# Patient Record
Sex: FEMALE | Race: WHITE | Employment: OTHER | URBAN - METROPOLITAN AREA
[De-identification: names, ages, dates, MRNs, and addresses within clinical notes are randomized per-mention and may not be internally consistent; named-entity substitution may affect disease eponyms.]

---

## 2019-01-01 ENCOUNTER — CARE COORDINATION (OUTPATIENT)
Dept: CASE MANAGEMENT | Age: 74
End: 2019-01-01

## 2019-01-01 ENCOUNTER — TELEPHONE (OUTPATIENT)
Dept: ONCOLOGY | Age: 74
End: 2019-01-01

## 2019-01-01 ENCOUNTER — HOSPITAL ENCOUNTER (OUTPATIENT)
Age: 74
Setting detail: SPECIMEN
Discharge: HOME | End: 2019-09-24
Payer: MEDICARE

## 2019-01-01 ENCOUNTER — APPOINTMENT (OUTPATIENT)
Dept: MRI IMAGING | Age: 74
DRG: 246 | End: 2019-01-01
Payer: MEDICARE

## 2019-01-01 ENCOUNTER — PREP FOR PROCEDURE (OUTPATIENT)
Dept: GYNECOLOGIC ONCOLOGY | Age: 74
End: 2019-01-01

## 2019-01-01 ENCOUNTER — APPOINTMENT (OUTPATIENT)
Dept: GENERAL RADIOLOGY | Age: 74
DRG: 377 | End: 2019-01-01
Payer: MEDICARE

## 2019-01-01 ENCOUNTER — HOSPITAL ENCOUNTER (OUTPATIENT)
Age: 74
Setting detail: SPECIMEN
Discharge: HOME | End: 2019-08-27
Payer: MEDICARE

## 2019-01-01 ENCOUNTER — TELEPHONE (OUTPATIENT)
Dept: INTERNAL MEDICINE CLINIC | Age: 74
End: 2019-01-01

## 2019-01-01 ENCOUNTER — NURSE TRIAGE (OUTPATIENT)
Dept: OTHER | Facility: CLINIC | Age: 74
End: 2019-01-01

## 2019-01-01 ENCOUNTER — APPOINTMENT (OUTPATIENT)
Dept: GENERAL RADIOLOGY | Age: 74
DRG: 193 | End: 2019-01-01
Payer: MEDICARE

## 2019-01-01 ENCOUNTER — HOSPITAL ENCOUNTER (OUTPATIENT)
Age: 74
Setting detail: SPECIMEN
Discharge: HOME | End: 2019-05-30
Payer: MEDICARE

## 2019-01-01 ENCOUNTER — APPOINTMENT (OUTPATIENT)
Dept: GENERAL RADIOLOGY | Age: 74
DRG: 246 | End: 2019-01-01
Payer: MEDICARE

## 2019-01-01 ENCOUNTER — HOSPITAL ENCOUNTER (INPATIENT)
Age: 74
LOS: 2 days | Discharge: HOME | DRG: 193 | End: 2019-04-15
Attending: EMERGENCY MEDICINE | Admitting: INTERNAL MEDICINE
Payer: MEDICARE

## 2019-01-01 ENCOUNTER — HOSPITAL ENCOUNTER (OUTPATIENT)
Age: 74
Setting detail: SPECIMEN
Discharge: HOME | End: 2019-09-23
Payer: MEDICARE

## 2019-01-01 ENCOUNTER — HOSPITAL ENCOUNTER (OUTPATIENT)
Age: 74
Setting detail: SPECIMEN
Discharge: HOME | End: 2019-10-01
Payer: MEDICARE

## 2019-01-01 ENCOUNTER — HOSPITAL ENCOUNTER (OUTPATIENT)
Dept: RADIATION ONCOLOGY | Age: 74
Discharge: HOME | End: 2019-10-01
Attending: RADIOLOGY
Payer: MEDICARE

## 2019-01-01 ENCOUNTER — HOSPITAL ENCOUNTER (OUTPATIENT)
Dept: GENERAL RADIOLOGY | Facility: CLINIC | Age: 74
Discharge: HOME | End: 2019-08-29
Payer: MEDICARE

## 2019-01-01 ENCOUNTER — OFFICE VISIT (OUTPATIENT)
Dept: INTERNAL MEDICINE CLINIC | Age: 74
End: 2019-01-01
Payer: MEDICARE

## 2019-01-01 ENCOUNTER — OFFICE VISIT (OUTPATIENT)
Dept: GYNECOLOGIC ONCOLOGY | Age: 74
End: 2019-01-01
Payer: MEDICARE

## 2019-01-01 ENCOUNTER — ANESTHESIA EVENT (OUTPATIENT)
Dept: OPERATING ROOM | Age: 74
DRG: 246 | End: 2019-01-01
Payer: MEDICARE

## 2019-01-01 ENCOUNTER — TELEPHONE (OUTPATIENT)
Dept: RADIATION ONCOLOGY | Age: 74
End: 2019-01-01

## 2019-01-01 ENCOUNTER — ANESTHESIA (OUTPATIENT)
Dept: ENDOSCOPY | Age: 74
DRG: 377 | End: 2019-01-01
Payer: MEDICARE

## 2019-01-01 ENCOUNTER — TELEPHONE (OUTPATIENT)
Dept: GYNECOLOGIC ONCOLOGY | Age: 74
End: 2019-01-01

## 2019-01-01 ENCOUNTER — APPOINTMENT (OUTPATIENT)
Dept: ULTRASOUND IMAGING | Age: 74
DRG: 246 | End: 2019-01-01
Payer: MEDICARE

## 2019-01-01 ENCOUNTER — OFFICE VISIT (OUTPATIENT)
Dept: DERMATOLOGY | Age: 74
End: 2019-01-01
Payer: MEDICARE

## 2019-01-01 ENCOUNTER — APPOINTMENT (OUTPATIENT)
Dept: INTERVENTIONAL RADIOLOGY/VASCULAR | Age: 74
DRG: 377 | End: 2019-01-01
Payer: MEDICARE

## 2019-01-01 ENCOUNTER — HOSPITAL ENCOUNTER (OUTPATIENT)
Dept: RADIATION ONCOLOGY | Age: 74
End: 2019-01-01
Attending: RADIOLOGY
Payer: MEDICARE

## 2019-01-01 ENCOUNTER — HOSPITAL ENCOUNTER (OUTPATIENT)
Dept: RADIATION ONCOLOGY | Age: 74
Discharge: HOME | End: 2019-09-30
Attending: RADIOLOGY
Payer: MEDICARE

## 2019-01-01 ENCOUNTER — APPOINTMENT (OUTPATIENT)
Dept: ULTRASOUND IMAGING | Age: 74
DRG: 377 | End: 2019-01-01
Payer: MEDICARE

## 2019-01-01 ENCOUNTER — HOSPITAL ENCOUNTER (OUTPATIENT)
Age: 74
Setting detail: SPECIMEN
Discharge: HOME | End: 2019-10-09
Payer: MEDICARE

## 2019-01-01 ENCOUNTER — APPOINTMENT (OUTPATIENT)
Dept: CARDIAC CATH/INVASIVE PROCEDURES | Age: 74
DRG: 246 | End: 2019-01-01
Payer: MEDICARE

## 2019-01-01 ENCOUNTER — ANESTHESIA EVENT (OUTPATIENT)
Dept: ENDOSCOPY | Age: 74
DRG: 377 | End: 2019-01-01
Payer: MEDICARE

## 2019-01-01 ENCOUNTER — HOSPITAL ENCOUNTER (OUTPATIENT)
Dept: RADIATION ONCOLOGY | Age: 74
Discharge: HOME | End: 2019-10-02
Attending: RADIOLOGY
Payer: MEDICARE

## 2019-01-01 ENCOUNTER — HOSPITAL ENCOUNTER (OUTPATIENT)
Dept: RADIATION ONCOLOGY | Age: 74
Discharge: HOME | End: 2019-09-27
Attending: RADIOLOGY
Payer: MEDICARE

## 2019-01-01 ENCOUNTER — OFFICE VISIT (OUTPATIENT)
Dept: ONCOLOGY | Age: 74
End: 2019-01-01
Payer: MEDICARE

## 2019-01-01 ENCOUNTER — HOSPITAL ENCOUNTER (INPATIENT)
Age: 74
LOS: 7 days | Discharge: NURSING, REHAB OR LONG-TERM CARE | DRG: 377 | End: 2019-11-19
Attending: EMERGENCY MEDICINE | Admitting: FAMILY MEDICINE
Payer: MEDICARE

## 2019-01-01 ENCOUNTER — HOSPITAL ENCOUNTER (OUTPATIENT)
Dept: RADIATION ONCOLOGY | Age: 74
Discharge: HOME | End: 2019-09-27
Payer: MEDICARE

## 2019-01-01 ENCOUNTER — HOSPITAL ENCOUNTER (OUTPATIENT)
Age: 74
Setting detail: SPECIMEN
Discharge: HOME | End: 2019-10-08
Payer: MEDICARE

## 2019-01-01 ENCOUNTER — APPOINTMENT (OUTPATIENT)
Dept: INTERVENTIONAL RADIOLOGY/VASCULAR | Age: 74
DRG: 246 | End: 2019-01-01
Payer: MEDICARE

## 2019-01-01 ENCOUNTER — HOSPITAL ENCOUNTER (OUTPATIENT)
Facility: CLINIC | Age: 74
Discharge: HOME | End: 2019-08-29
Payer: MEDICARE

## 2019-01-01 ENCOUNTER — HOSPITAL ENCOUNTER (OUTPATIENT)
Facility: MEDICAL CENTER | Age: 74
End: 2019-01-01
Payer: MEDICARE

## 2019-01-01 ENCOUNTER — APPOINTMENT (OUTPATIENT)
Dept: CT IMAGING | Age: 74
DRG: 377 | End: 2019-01-01
Payer: MEDICARE

## 2019-01-01 ENCOUNTER — CLINICAL DOCUMENTATION (OUTPATIENT)
Dept: RADIATION ONCOLOGY | Age: 74
End: 2019-01-01

## 2019-01-01 ENCOUNTER — HOSPITAL ENCOUNTER (OUTPATIENT)
Dept: RADIATION ONCOLOGY | Age: 74
Discharge: HOME | End: 2019-10-03
Attending: RADIOLOGY
Payer: MEDICARE

## 2019-01-01 ENCOUNTER — HOSPITAL ENCOUNTER (INPATIENT)
Age: 74
LOS: 16 days | Discharge: NURSING, REHAB OR LONG-TERM CARE | DRG: 246 | End: 2019-09-18
Attending: EMERGENCY MEDICINE | Admitting: INTERNAL MEDICINE
Payer: MEDICARE

## 2019-01-01 ENCOUNTER — HOSPITAL ENCOUNTER (OUTPATIENT)
Age: 74
Setting detail: SPECIMEN
Discharge: HOME | End: 2019-10-12
Payer: MEDICARE

## 2019-01-01 ENCOUNTER — APPOINTMENT (OUTPATIENT)
Dept: CT IMAGING | Age: 74
DRG: 246 | End: 2019-01-01
Payer: MEDICARE

## 2019-01-01 ENCOUNTER — ANESTHESIA (OUTPATIENT)
Dept: OPERATING ROOM | Age: 74
DRG: 246 | End: 2019-01-01
Payer: MEDICARE

## 2019-01-01 VITALS — OXYGEN SATURATION: 100 % | DIASTOLIC BLOOD PRESSURE: 54 MMHG | SYSTOLIC BLOOD PRESSURE: 86 MMHG

## 2019-01-01 VITALS
WEIGHT: 292 LBS | OXYGEN SATURATION: 95 % | HEIGHT: 67 IN | BODY MASS INDEX: 45.83 KG/M2 | DIASTOLIC BLOOD PRESSURE: 74 MMHG | SYSTOLIC BLOOD PRESSURE: 138 MMHG | HEART RATE: 86 BPM

## 2019-01-01 VITALS
OXYGEN SATURATION: 95 % | BODY MASS INDEX: 45.99 KG/M2 | WEIGHT: 293 LBS | SYSTOLIC BLOOD PRESSURE: 164 MMHG | HEART RATE: 75 BPM | DIASTOLIC BLOOD PRESSURE: 78 MMHG | HEIGHT: 67 IN

## 2019-01-01 VITALS
SYSTOLIC BLOOD PRESSURE: 95 MMHG | HEART RATE: 62 BPM | RESPIRATION RATE: 19 BRPM | DIASTOLIC BLOOD PRESSURE: 43 MMHG | TEMPERATURE: 98.8 F

## 2019-01-01 VITALS
HEART RATE: 85 BPM | BODY MASS INDEX: 44.1 KG/M2 | OXYGEN SATURATION: 96 % | HEIGHT: 67 IN | SYSTOLIC BLOOD PRESSURE: 164 MMHG | DIASTOLIC BLOOD PRESSURE: 84 MMHG | WEIGHT: 281 LBS

## 2019-01-01 VITALS
DIASTOLIC BLOOD PRESSURE: 62 MMHG | HEIGHT: 67 IN | HEART RATE: 102 BPM | OXYGEN SATURATION: 94 % | SYSTOLIC BLOOD PRESSURE: 118 MMHG | BODY MASS INDEX: 43.25 KG/M2

## 2019-01-01 VITALS
SYSTOLIC BLOOD PRESSURE: 48 MMHG | HEIGHT: 68 IN | TEMPERATURE: 97.3 F | HEART RATE: 85 BPM | WEIGHT: 249.56 LBS | RESPIRATION RATE: 28 BRPM | BODY MASS INDEX: 37.82 KG/M2 | OXYGEN SATURATION: 90 % | DIASTOLIC BLOOD PRESSURE: 18 MMHG

## 2019-01-01 VITALS
DIASTOLIC BLOOD PRESSURE: 64 MMHG | SYSTOLIC BLOOD PRESSURE: 97 MMHG | HEART RATE: 84 BPM | TEMPERATURE: 97.4 F | OXYGEN SATURATION: 99 % | RESPIRATION RATE: 16 BRPM

## 2019-01-01 VITALS
SYSTOLIC BLOOD PRESSURE: 100 MMHG | TEMPERATURE: 98.3 F | RESPIRATION RATE: 14 BRPM | DIASTOLIC BLOOD PRESSURE: 63 MMHG | HEART RATE: 77 BPM | OXYGEN SATURATION: 98 %

## 2019-01-01 VITALS
DIASTOLIC BLOOD PRESSURE: 61 MMHG | SYSTOLIC BLOOD PRESSURE: 99 MMHG | TEMPERATURE: 97.4 F | BODY MASS INDEX: 42.75 KG/M2 | HEART RATE: 68 BPM | WEIGHT: 273 LBS

## 2019-01-01 VITALS
DIASTOLIC BLOOD PRESSURE: 84 MMHG | HEIGHT: 68 IN | OXYGEN SATURATION: 93 % | WEIGHT: 293 LBS | BODY MASS INDEX: 44.41 KG/M2 | HEART RATE: 93 BPM | SYSTOLIC BLOOD PRESSURE: 136 MMHG

## 2019-01-01 VITALS
WEIGHT: 276.24 LBS | SYSTOLIC BLOOD PRESSURE: 125 MMHG | DIASTOLIC BLOOD PRESSURE: 60 MMHG | RESPIRATION RATE: 24 BRPM | OXYGEN SATURATION: 92 % | TEMPERATURE: 98 F | BODY MASS INDEX: 43.36 KG/M2 | HEART RATE: 70 BPM | HEIGHT: 67 IN

## 2019-01-01 VITALS — TEMPERATURE: 98.3 F | DIASTOLIC BLOOD PRESSURE: 85 MMHG | SYSTOLIC BLOOD PRESSURE: 159 MMHG | OXYGEN SATURATION: 99 %

## 2019-01-01 VITALS
DIASTOLIC BLOOD PRESSURE: 58 MMHG | TEMPERATURE: 97.1 F | HEART RATE: 73 BPM | OXYGEN SATURATION: 100 % | SYSTOLIC BLOOD PRESSURE: 138 MMHG

## 2019-01-01 VITALS
OXYGEN SATURATION: 96 % | HEART RATE: 89 BPM | SYSTOLIC BLOOD PRESSURE: 162 MMHG | BODY MASS INDEX: 44.41 KG/M2 | TEMPERATURE: 98.3 F | DIASTOLIC BLOOD PRESSURE: 58 MMHG | RESPIRATION RATE: 18 BRPM | WEIGHT: 293 LBS | HEIGHT: 68 IN

## 2019-01-01 DIAGNOSIS — C54.1 ENDOMETRIAL ADENOCARCINOMA (HCC): Primary | ICD-10-CM

## 2019-01-01 DIAGNOSIS — C54.1 ENDOMETRIAL ADENOCARCINOMA (HCC): ICD-10-CM

## 2019-01-01 DIAGNOSIS — I50.43 CHF (CONGESTIVE HEART FAILURE), NYHA CLASS I, ACUTE ON CHRONIC, COMBINED (HCC): ICD-10-CM

## 2019-01-01 DIAGNOSIS — Q63.8 DUPLEX KIDNEY: ICD-10-CM

## 2019-01-01 DIAGNOSIS — D64.9 HEMOGLOBIN LOW: ICD-10-CM

## 2019-01-01 DIAGNOSIS — R06.02 SHORTNESS OF BREATH: Primary | ICD-10-CM

## 2019-01-01 DIAGNOSIS — R06.02 SHORTNESS OF BREATH: ICD-10-CM

## 2019-01-01 DIAGNOSIS — I10 ESSENTIAL HYPERTENSION: Primary | ICD-10-CM

## 2019-01-01 DIAGNOSIS — I50.9 CONGESTIVE HEART FAILURE, UNSPECIFIED HF CHRONICITY, UNSPECIFIED HEART FAILURE TYPE (HCC): Primary | ICD-10-CM

## 2019-01-01 DIAGNOSIS — J18.9 PNEUMONIA DUE TO ORGANISM: Primary | ICD-10-CM

## 2019-01-01 DIAGNOSIS — E03.8 SUBCLINICAL HYPOTHYROIDISM: ICD-10-CM

## 2019-01-01 DIAGNOSIS — E66.01 MORBID OBESITY (HCC): ICD-10-CM

## 2019-01-01 DIAGNOSIS — I50.812 CHRONIC RIGHT-SIDED CONGESTIVE HEART FAILURE (HCC): ICD-10-CM

## 2019-01-01 DIAGNOSIS — E06.3 HASHIMOTO'S THYROIDITIS: Primary | ICD-10-CM

## 2019-01-01 DIAGNOSIS — K59.00 CONSTIPATION, UNSPECIFIED CONSTIPATION TYPE: ICD-10-CM

## 2019-01-01 DIAGNOSIS — D50.9 IRON DEFICIENCY ANEMIA, UNSPECIFIED IRON DEFICIENCY ANEMIA TYPE: ICD-10-CM

## 2019-01-01 DIAGNOSIS — I10 ESSENTIAL HYPERTENSION: ICD-10-CM

## 2019-01-01 DIAGNOSIS — Z12.39 BREAST CANCER SCREENING: ICD-10-CM

## 2019-01-01 DIAGNOSIS — I82.5Y3 CHRONIC DEEP VEIN THROMBOSIS (DVT) OF PROXIMAL VEIN OF BOTH LOWER EXTREMITIES (HCC): ICD-10-CM

## 2019-01-01 DIAGNOSIS — I10 RESISTANT HYPERTENSION: ICD-10-CM

## 2019-01-01 DIAGNOSIS — L91.8 INFLAMED ACROCHORDON: Primary | ICD-10-CM

## 2019-01-01 DIAGNOSIS — I10 RESISTANT HYPERTENSION: Primary | ICD-10-CM

## 2019-01-01 DIAGNOSIS — K92.2 GASTROINTESTINAL HEMORRHAGE, UNSPECIFIED GASTROINTESTINAL HEMORRHAGE TYPE: Primary | ICD-10-CM

## 2019-01-01 DIAGNOSIS — L91.8 SKIN TAGS, MULTIPLE ACQUIRED: ICD-10-CM

## 2019-01-01 DIAGNOSIS — Z00.00 HEALTH CARE MAINTENANCE: ICD-10-CM

## 2019-01-01 DIAGNOSIS — N95.0 POSTMENOPAUSAL BLEEDING: Primary | ICD-10-CM

## 2019-01-01 DIAGNOSIS — Z12.11 COLON CANCER SCREENING: ICD-10-CM

## 2019-01-01 DIAGNOSIS — I16.1 HYPERTENSIVE EMERGENCY: ICD-10-CM

## 2019-01-01 DIAGNOSIS — E06.3 HASHIMOTO'S THYROIDITIS: ICD-10-CM

## 2019-01-01 DIAGNOSIS — B59 PNEUMONIA OF BOTH UPPER LOBES DUE TO PNEUMOCYSTIS JIROVECII (HCC): Primary | ICD-10-CM

## 2019-01-01 DIAGNOSIS — I10 HYPERTENSION, UNSPECIFIED TYPE: ICD-10-CM

## 2019-01-01 LAB
-: ABNORMAL
-: ABNORMAL
-: NORMAL
-: NORMAL
ABO/RH: NORMAL
ABSOLUTE BANDS #: 0.26 K/UL (ref 0–1)
ABSOLUTE EOS #: 0 K/UL (ref 0–0.4)
ABSOLUTE EOS #: 0.06 K/UL (ref 0–0.44)
ABSOLUTE EOS #: 0.07 K/UL (ref 0–0.44)
ABSOLUTE EOS #: 0.07 K/UL (ref 0–0.44)
ABSOLUTE EOS #: 0.08 K/UL (ref 0–0.44)
ABSOLUTE EOS #: 0.1 K/UL (ref 0–0.4)
ABSOLUTE EOS #: 0.1 K/UL (ref 0–0.44)
ABSOLUTE EOS #: 0.11 K/UL (ref 0–0.44)
ABSOLUTE EOS #: 0.12 K/UL (ref 0–0.44)
ABSOLUTE EOS #: 0.13 K/UL (ref 0–0.4)
ABSOLUTE EOS #: 0.13 K/UL (ref 0–0.44)
ABSOLUTE EOS #: 0.14 K/UL (ref 0–0.44)
ABSOLUTE EOS #: 0.16 K/UL (ref 0–0.44)
ABSOLUTE EOS #: 0.17 K/UL (ref 0–0.44)
ABSOLUTE EOS #: 0.18 K/UL (ref 0–0.44)
ABSOLUTE EOS #: <0.03 K/UL (ref 0–0.44)
ABSOLUTE EOS #: <0.03 K/UL (ref 0–0.44)
ABSOLUTE IMMATURE GRANULOCYTE: 0.03 K/UL (ref 0–0.3)
ABSOLUTE IMMATURE GRANULOCYTE: 0.03 K/UL (ref 0–0.3)
ABSOLUTE IMMATURE GRANULOCYTE: 0.04 K/UL (ref 0–0.3)
ABSOLUTE IMMATURE GRANULOCYTE: 0.05 K/UL (ref 0–0.3)
ABSOLUTE IMMATURE GRANULOCYTE: 0.06 K/UL (ref 0–0.3)
ABSOLUTE IMMATURE GRANULOCYTE: 0.07 K/UL (ref 0–0.3)
ABSOLUTE IMMATURE GRANULOCYTE: 0.08 K/UL (ref 0–0.3)
ABSOLUTE IMMATURE GRANULOCYTE: 0.11 K/UL (ref 0–0.3)
ABSOLUTE IMMATURE GRANULOCYTE: 0.13 K/UL (ref 0–0.3)
ABSOLUTE IMMATURE GRANULOCYTE: 0.17 K/UL (ref 0–0.3)
ABSOLUTE IMMATURE GRANULOCYTE: 0.17 K/UL (ref 0–0.3)
ABSOLUTE IMMATURE GRANULOCYTE: <0.03 K/UL (ref 0–0.3)
ABSOLUTE IMMATURE GRANULOCYTE: ABNORMAL K/UL (ref 0–0.3)
ABSOLUTE LYMPH #: 0.26 K/UL (ref 1–4.8)
ABSOLUTE LYMPH #: 0.37 K/UL (ref 1–4.8)
ABSOLUTE LYMPH #: 0.4 K/UL (ref 1–4.8)
ABSOLUTE LYMPH #: 0.47 K/UL (ref 1–4.8)
ABSOLUTE LYMPH #: 0.53 K/UL (ref 1–4.8)
ABSOLUTE LYMPH #: 0.7 K/UL (ref 1–4.8)
ABSOLUTE LYMPH #: 0.84 K/UL (ref 1.1–3.7)
ABSOLUTE LYMPH #: 0.88 K/UL (ref 1.1–3.7)
ABSOLUTE LYMPH #: 0.91 K/UL (ref 1.1–3.7)
ABSOLUTE LYMPH #: 1.01 K/UL (ref 1.1–3.7)
ABSOLUTE LYMPH #: 1.04 K/UL (ref 1.1–3.7)
ABSOLUTE LYMPH #: 1.07 K/UL (ref 1.1–3.7)
ABSOLUTE LYMPH #: 1.12 K/UL (ref 1.1–3.7)
ABSOLUTE LYMPH #: 1.15 K/UL (ref 1.1–3.7)
ABSOLUTE LYMPH #: 1.17 K/UL (ref 1.1–3.7)
ABSOLUTE LYMPH #: 1.18 K/UL (ref 1.1–3.7)
ABSOLUTE LYMPH #: 1.2 K/UL (ref 1.1–3.7)
ABSOLUTE LYMPH #: 1.36 K/UL (ref 1.1–3.7)
ABSOLUTE LYMPH #: 1.36 K/UL (ref 1.1–3.7)
ABSOLUTE LYMPH #: 1.7 K/UL (ref 1.1–3.7)
ABSOLUTE MONO #: 0.17 K/UL (ref 0.1–1.3)
ABSOLUTE MONO #: 0.29 K/UL (ref 0.1–1.3)
ABSOLUTE MONO #: 0.34 K/UL (ref 0.1–1.3)
ABSOLUTE MONO #: 0.38 K/UL (ref 0.1–1.3)
ABSOLUTE MONO #: 0.4 K/UL (ref 0.1–1.3)
ABSOLUTE MONO #: 0.4 K/UL (ref 0.1–1.3)
ABSOLUTE MONO #: 0.49 K/UL (ref 0.1–1.2)
ABSOLUTE MONO #: 0.52 K/UL (ref 0.1–1.2)
ABSOLUTE MONO #: 0.54 K/UL (ref 0.1–1.2)
ABSOLUTE MONO #: 0.55 K/UL (ref 0.1–1.2)
ABSOLUTE MONO #: 0.58 K/UL (ref 0.1–1.2)
ABSOLUTE MONO #: 0.6 K/UL (ref 0.1–1.2)
ABSOLUTE MONO #: 0.62 K/UL (ref 0.1–1.2)
ABSOLUTE MONO #: 0.66 K/UL (ref 0.1–1.2)
ABSOLUTE MONO #: 0.69 K/UL (ref 0.1–1.2)
ABSOLUTE MONO #: 0.86 K/UL (ref 0.1–1.2)
ABSOLUTE RETIC #: 0.11 M/UL (ref 0.03–0.08)
ALBUMIN SERPL-MCNC: 2.4 G/DL (ref 3.5–5.2)
ALBUMIN SERPL-MCNC: 2.4 G/DL (ref 3.5–5.2)
ALBUMIN SERPL-MCNC: 4 G/DL (ref 3.5–5.2)
ALBUMIN/GLOBULIN RATIO: 0.8 (ref 1–2.5)
ALBUMIN/GLOBULIN RATIO: ABNORMAL (ref 1–2.5)
ALBUMIN/GLOBULIN RATIO: ABNORMAL (ref 1–2.5)
ALDOSTERONE COMMENT: NORMAL
ALDOSTERONE: 6.5 NG/DL
ALLEN TEST: ABNORMAL
ALP BLD-CCNC: 64 U/L (ref 35–104)
ALP BLD-CCNC: 73 U/L (ref 35–104)
ALP BLD-CCNC: 81 U/L (ref 35–104)
ALT SERPL-CCNC: 11 U/L (ref 5–33)
ALT SERPL-CCNC: 12 U/L (ref 5–33)
ALT SERPL-CCNC: 14 U/L (ref 5–33)
AMORPHOUS: ABNORMAL
AMORPHOUS: ABNORMAL
AMORPHOUS: NORMAL
ANA REFERENCE RANGE:: ABNORMAL
ANION GAP SERPL CALCULATED.3IONS-SCNC: 10 MMOL/L (ref 9–17)
ANION GAP SERPL CALCULATED.3IONS-SCNC: 11 MMOL/L (ref 9–17)
ANION GAP SERPL CALCULATED.3IONS-SCNC: 12 MMOL/L (ref 9–17)
ANION GAP SERPL CALCULATED.3IONS-SCNC: 13 MMOL/L (ref 9–17)
ANION GAP SERPL CALCULATED.3IONS-SCNC: 14 MMOL/L (ref 9–17)
ANION GAP SERPL CALCULATED.3IONS-SCNC: 16 MMOL/L (ref 9–17)
ANION GAP SERPL CALCULATED.3IONS-SCNC: 17 MMOL/L (ref 9–17)
ANION GAP SERPL CALCULATED.3IONS-SCNC: 8 MMOL/L (ref 9–17)
ANION GAP SERPL CALCULATED.3IONS-SCNC: 8 MMOL/L (ref 9–17)
ANION GAP SERPL CALCULATED.3IONS-SCNC: 9 MMOL/L (ref 9–17)
ANION GAP SERPL CALCULATED.3IONS-SCNC: 9 MMOL/L (ref 9–17)
ANION GAP: 12 MMOL/L (ref 7–16)
ANTI DNA DOUBLE STRANDED: 48 IU/ML
ANTI JO-1 IGG: 16 U/ML
ANTI RNP AB: 27 U/ML
ANTI SSA: 121 U/ML
ANTI SSB: 13 U/ML
ANTI-CENTROMERE: 18 U/ML
ANTI-NUCLEAR ANTIBODY (ANA): POSITIVE
ANTI-SCLERODERMA: 57 U/ML
ANTI-SMITH: 21 U/ML
ANTIBODY SCREEN: NEGATIVE
ARM BAND NUMBER: NORMAL
AST SERPL-CCNC: 15 U/L
AST SERPL-CCNC: 19 U/L
AST SERPL-CCNC: 21 U/L
BACTERIA: ABNORMAL
BACTERIA: ABNORMAL
BACTERIA: NORMAL
BANDS: 3 % (ref 0–10)
BASOPHILS # BLD: 0 % (ref 0–2)
BASOPHILS # BLD: 1 % (ref 0–2)
BASOPHILS ABSOLUTE: 0 K/UL (ref 0–0.2)
BASOPHILS ABSOLUTE: 0.03 K/UL (ref 0–0.2)
BASOPHILS ABSOLUTE: 0.03 K/UL (ref 0–0.2)
BASOPHILS ABSOLUTE: 0.04 K/UL (ref 0–0.2)
BASOPHILS ABSOLUTE: 0.05 K/UL (ref 0–0.2)
BASOPHILS ABSOLUTE: 0.09 K/UL (ref 0–0.2)
BASOPHILS ABSOLUTE: 0.1 K/UL (ref 0–0.2)
BASOPHILS ABSOLUTE: <0.03 K/UL (ref 0–0.2)
BILIRUB SERPL-MCNC: 0.53 MG/DL (ref 0.3–1.2)
BILIRUB SERPL-MCNC: 0.62 MG/DL (ref 0.3–1.2)
BILIRUB SERPL-MCNC: 0.73 MG/DL (ref 0.3–1.2)
BILIRUBIN URINE: ABNORMAL
BILIRUBIN URINE: NEGATIVE
BILIRUBIN URINE: NEGATIVE
BLD PROD TYP BPU: NORMAL
BLOOD BANK SPECIMEN: NORMAL
BNP INTERPRETATION: ABNORMAL
BUN BLDV-MCNC: 11 MG/DL (ref 8–23)
BUN BLDV-MCNC: 11 MG/DL (ref 8–23)
BUN BLDV-MCNC: 14 MG/DL (ref 8–23)
BUN BLDV-MCNC: 16 MG/DL (ref 8–23)
BUN BLDV-MCNC: 17 MG/DL (ref 8–23)
BUN BLDV-MCNC: 20 MG/DL (ref 8–23)
BUN BLDV-MCNC: 20 MG/DL (ref 8–23)
BUN BLDV-MCNC: 21 MG/DL (ref 8–23)
BUN BLDV-MCNC: 21 MG/DL (ref 8–23)
BUN BLDV-MCNC: 22 MG/DL (ref 8–23)
BUN BLDV-MCNC: 23 MG/DL (ref 8–23)
BUN BLDV-MCNC: 25 MG/DL (ref 8–23)
BUN BLDV-MCNC: 25 MG/DL (ref 8–23)
BUN BLDV-MCNC: 27 MG/DL (ref 8–23)
BUN BLDV-MCNC: 28 MG/DL (ref 8–23)
BUN BLDV-MCNC: 39 MG/DL (ref 8–23)
BUN BLDV-MCNC: 40 MG/DL (ref 8–23)
BUN BLDV-MCNC: 43 MG/DL (ref 8–23)
BUN BLDV-MCNC: 47 MG/DL (ref 8–23)
BUN BLDV-MCNC: 49 MG/DL (ref 8–23)
BUN BLDV-MCNC: 79 MG/DL (ref 8–23)
BUN BLDV-MCNC: 80 MG/DL (ref 8–23)
BUN BLDV-MCNC: 83 MG/DL (ref 8–23)
BUN BLDV-MCNC: 85 MG/DL (ref 8–23)
BUN BLDV-MCNC: 85 MG/DL (ref 8–23)
BUN BLDV-MCNC: 86 MG/DL (ref 8–23)
BUN/CREAT BLD: ABNORMAL (ref 9–20)
C TRACH DNA GENITAL QL NAA+PROBE: NEGATIVE
CA 125: 2780 U/ML
CA 19-9: 2309 U/ML (ref 0–35)
CALCIUM SERPL-MCNC: 7.6 MG/DL (ref 8.6–10.4)
CALCIUM SERPL-MCNC: 7.6 MG/DL (ref 8.6–10.4)
CALCIUM SERPL-MCNC: 7.7 MG/DL (ref 8.6–10.4)
CALCIUM SERPL-MCNC: 7.8 MG/DL (ref 8.6–10.4)
CALCIUM SERPL-MCNC: 7.9 MG/DL (ref 8.6–10.4)
CALCIUM SERPL-MCNC: 8 MG/DL (ref 8.6–10.4)
CALCIUM SERPL-MCNC: 8.1 MG/DL (ref 8.6–10.4)
CALCIUM SERPL-MCNC: 8.2 MG/DL (ref 8.6–10.4)
CALCIUM SERPL-MCNC: 8.3 MG/DL (ref 8.6–10.4)
CALCIUM SERPL-MCNC: 8.3 MG/DL (ref 8.6–10.4)
CALCIUM SERPL-MCNC: 8.5 MG/DL (ref 8.6–10.4)
CALCIUM SERPL-MCNC: 8.8 MG/DL (ref 8.6–10.4)
CALCIUM SERPL-MCNC: 8.8 MG/DL (ref 8.6–10.4)
CALCIUM SERPL-MCNC: 8.9 MG/DL (ref 8.6–10.4)
CALCIUM SERPL-MCNC: 9 MG/DL (ref 8.6–10.4)
CARCINOEMBRYONIC ANTIGEN: 25.1 NG/ML
CASTS UA: ABNORMAL /LPF
CASTS UA: ABNORMAL /LPF
CASTS UA: NORMAL /LPF (ref 0–8)
CHLORIDE BLD-SCNC: 100 MMOL/L (ref 98–107)
CHLORIDE BLD-SCNC: 101 MMOL/L (ref 98–107)
CHLORIDE BLD-SCNC: 101 MMOL/L (ref 98–107)
CHLORIDE BLD-SCNC: 102 MMOL/L (ref 98–107)
CHLORIDE BLD-SCNC: 103 MMOL/L (ref 98–107)
CHLORIDE BLD-SCNC: 103 MMOL/L (ref 98–107)
CHLORIDE BLD-SCNC: 104 MMOL/L (ref 98–107)
CHLORIDE BLD-SCNC: 106 MMOL/L (ref 98–107)
CHLORIDE BLD-SCNC: 109 MMOL/L (ref 98–107)
CHLORIDE BLD-SCNC: 109 MMOL/L (ref 98–107)
CHLORIDE BLD-SCNC: 112 MMOL/L (ref 98–107)
CHLORIDE BLD-SCNC: 113 MMOL/L (ref 98–107)
CHLORIDE BLD-SCNC: 115 MMOL/L (ref 98–107)
CHLORIDE BLD-SCNC: 116 MMOL/L (ref 98–107)
CHLORIDE BLD-SCNC: 96 MMOL/L (ref 98–107)
CHLORIDE BLD-SCNC: 97 MMOL/L (ref 98–107)
CHLORIDE BLD-SCNC: 99 MMOL/L (ref 98–107)
CHLORIDE, UR: <20 MMOL/L
CHOLESTEROL/HDL RATIO: 4.2
CHOLESTEROL: 157 MG/DL
CO2: 17 MMOL/L (ref 20–31)
CO2: 18 MMOL/L (ref 20–31)
CO2: 18 MMOL/L (ref 20–31)
CO2: 20 MMOL/L (ref 20–31)
CO2: 20 MMOL/L (ref 20–31)
CO2: 21 MMOL/L (ref 20–31)
CO2: 21 MMOL/L (ref 20–31)
CO2: 22 MMOL/L (ref 20–31)
CO2: 23 MMOL/L (ref 20–31)
CO2: 24 MMOL/L (ref 20–31)
CO2: 25 MMOL/L (ref 20–31)
CO2: 26 MMOL/L (ref 20–31)
CO2: 27 MMOL/L (ref 20–31)
CO2: 29 MMOL/L (ref 20–31)
CO2: 29 MMOL/L (ref 20–31)
COLOR: ABNORMAL
COLOR: ABNORMAL
COLOR: YELLOW
COMMENT UA: ABNORMAL
COMPLEMENT C3: 58 MG/DL (ref 90–180)
COMPLEMENT C4: 29 MG/DL (ref 10–40)
CREAT SERPL-MCNC: 0.63 MG/DL (ref 0.5–0.9)
CREAT SERPL-MCNC: 0.68 MG/DL (ref 0.5–0.9)
CREAT SERPL-MCNC: 0.72 MG/DL (ref 0.5–0.9)
CREAT SERPL-MCNC: 0.75 MG/DL (ref 0.5–0.9)
CREAT SERPL-MCNC: 0.75 MG/DL (ref 0.5–0.9)
CREAT SERPL-MCNC: 0.77 MG/DL (ref 0.5–0.9)
CREAT SERPL-MCNC: 0.78 MG/DL (ref 0.5–0.9)
CREAT SERPL-MCNC: 0.79 MG/DL (ref 0.5–0.9)
CREAT SERPL-MCNC: 0.83 MG/DL (ref 0.5–0.9)
CREAT SERPL-MCNC: 0.85 MG/DL (ref 0.5–0.9)
CREAT SERPL-MCNC: 0.9 MG/DL (ref 0.5–0.9)
CREAT SERPL-MCNC: 0.92 MG/DL (ref 0.5–0.9)
CREAT SERPL-MCNC: 0.94 MG/DL (ref 0.5–0.9)
CREAT SERPL-MCNC: 0.97 MG/DL (ref 0.5–0.9)
CREAT SERPL-MCNC: 1 MG/DL (ref 0.5–0.9)
CREAT SERPL-MCNC: 1.03 MG/DL (ref 0.5–0.9)
CREAT SERPL-MCNC: 1.11 MG/DL (ref 0.5–0.9)
CREAT SERPL-MCNC: 1.14 MG/DL (ref 0.5–0.9)
CREAT SERPL-MCNC: 1.29 MG/DL (ref 0.5–0.9)
CREAT SERPL-MCNC: 1.35 MG/DL (ref 0.5–0.9)
CREAT SERPL-MCNC: 2 MG/DL (ref 0.5–0.9)
CREAT SERPL-MCNC: 2.2 MG/DL (ref 0.5–0.9)
CREAT SERPL-MCNC: 2.24 MG/DL (ref 0.5–0.9)
CREAT SERPL-MCNC: 2.24 MG/DL (ref 0.5–0.9)
CREAT SERPL-MCNC: 2.28 MG/DL (ref 0.5–0.9)
CREAT SERPL-MCNC: 2.32 MG/DL (ref 0.5–0.9)
CREAT SERPL-MCNC: 2.36 MG/DL (ref 0.5–0.9)
CREAT SERPL-MCNC: 2.38 MG/DL (ref 0.5–0.9)
CROSSMATCH RESULT: NORMAL
CRYSTALS, UA: ABNORMAL /HPF
CRYSTALS, UA: ABNORMAL /HPF
CRYSTALS, UA: NORMAL /HPF
CULTURE: ABNORMAL
CULTURE: ABNORMAL
CULTURE: NO GROWTH
CULTURE: NORMAL
CYTOLOGY REPORT: NORMAL
DATE, STOOL #1: ABNORMAL
DATE, STOOL #1: NORMAL
DATE, STOOL #2: ABNORMAL
DATE, STOOL #2: NORMAL
DATE, STOOL #3: ABNORMAL
DATE, STOOL #3: NORMAL
DIFFERENTIAL TYPE: ABNORMAL
DIRECT EXAM: ABNORMAL
DIRECT EXAM: NORMAL
DISPENSE STATUS BLOOD BANK: NORMAL
EKG ATRIAL RATE: 117 BPM
EKG ATRIAL RATE: 84 BPM
EKG ATRIAL RATE: 89 BPM
EKG ATRIAL RATE: 95 BPM
EKG P AXIS: 36 DEGREES
EKG P AXIS: 58 DEGREES
EKG P AXIS: 62 DEGREES
EKG P AXIS: 69 DEGREES
EKG P-R INTERVAL: 162 MS
EKG P-R INTERVAL: 168 MS
EKG P-R INTERVAL: 178 MS
EKG P-R INTERVAL: 204 MS
EKG Q-T INTERVAL: 314 MS
EKG Q-T INTERVAL: 344 MS
EKG Q-T INTERVAL: 364 MS
EKG Q-T INTERVAL: 376 MS
EKG QRS DURATION: 74 MS
EKG QRS DURATION: 76 MS
EKG QRS DURATION: 80 MS
EKG QRS DURATION: 80 MS
EKG QTC CALCULATION (BAZETT): 418 MS
EKG QTC CALCULATION (BAZETT): 438 MS
EKG QTC CALCULATION (BAZETT): 444 MS
EKG QTC CALCULATION (BAZETT): 457 MS
EKG R AXIS: 24 DEGREES
EKG R AXIS: 28 DEGREES
EKG R AXIS: 36 DEGREES
EKG R AXIS: 36 DEGREES
EKG T AXIS: 103 DEGREES
EKG T AXIS: 112 DEGREES
EKG T AXIS: 61 DEGREES
EKG T AXIS: 78 DEGREES
EKG VENTRICULAR RATE: 117 BPM
EKG VENTRICULAR RATE: 84 BPM
EKG VENTRICULAR RATE: 89 BPM
EKG VENTRICULAR RATE: 95 BPM
EOSINOPHIL,URINE: NORMAL
EOSINOPHILS RELATIVE PERCENT: 0 % (ref 0–4)
EOSINOPHILS RELATIVE PERCENT: 0 % (ref 1–4)
EOSINOPHILS RELATIVE PERCENT: 0 % (ref 1–4)
EOSINOPHILS RELATIVE PERCENT: 1 % (ref 1–4)
EOSINOPHILS RELATIVE PERCENT: 2 % (ref 0–4)
EOSINOPHILS RELATIVE PERCENT: 2 % (ref 0–4)
EOSINOPHILS RELATIVE PERCENT: 2 % (ref 1–4)
EPITHELIAL CELLS UA: ABNORMAL /HPF
EPITHELIAL CELLS UA: ABNORMAL /HPF
EPITHELIAL CELLS UA: NORMAL /HPF (ref 0–5)
ESTIMATED AVERAGE GLUCOSE: 114 MG/DL
EXPIRATION DATE: NORMAL
FERRITIN: 106 UG/L (ref 13–150)
FIO2: ABNORMAL
FLOW CYTOMETRY BL: NORMAL
FLOW CYTOMETRY SOURCE: NORMAL
FLOW CYTOMETRY, NODE/FLUID: NORMAL
FREE KAPPA/LAMBDA RATIO: 1.26 (ref 0.26–1.65)
GFR AFRICAN AMERICAN: 24 ML/MIN
GFR AFRICAN AMERICAN: 24 ML/MIN
GFR AFRICAN AMERICAN: 25 ML/MIN
GFR AFRICAN AMERICAN: 25 ML/MIN
GFR AFRICAN AMERICAN: 26 ML/MIN
GFR AFRICAN AMERICAN: 29 ML/MIN
GFR AFRICAN AMERICAN: 46 ML/MIN
GFR AFRICAN AMERICAN: 49 ML/MIN
GFR AFRICAN AMERICAN: 56 ML/MIN
GFR AFRICAN AMERICAN: 58 ML/MIN
GFR AFRICAN AMERICAN: >60 ML/MIN
GFR NON-AFRICAN AMERICAN: 20 ML/MIN
GFR NON-AFRICAN AMERICAN: 21 ML/MIN
GFR NON-AFRICAN AMERICAN: 22 ML/MIN
GFR NON-AFRICAN AMERICAN: 24 ML/MIN
GFR NON-AFRICAN AMERICAN: 38 ML/MIN
GFR NON-AFRICAN AMERICAN: 40 ML/MIN
GFR NON-AFRICAN AMERICAN: 46 ML/MIN
GFR NON-AFRICAN AMERICAN: 48 ML/MIN
GFR NON-AFRICAN AMERICAN: 52 ML/MIN
GFR NON-AFRICAN AMERICAN: 54 ML/MIN
GFR NON-AFRICAN AMERICAN: 56 ML/MIN
GFR NON-AFRICAN AMERICAN: 58 ML/MIN
GFR NON-AFRICAN AMERICAN: 60 ML/MIN
GFR NON-AFRICAN AMERICAN: >60 ML/MIN
GFR SERPL CREATININE-BSD FRML MDRD: >60 ML/MIN
GFR SERPL CREATININE-BSD FRML MDRD: ABNORMAL ML/MIN/{1.73_M2}
GFR SERPL CREATININE-BSD FRML MDRD: NORMAL ML/MIN/{1.73_M2}
GLUCOSE BLD-MCNC: 101 MG/DL (ref 70–99)
GLUCOSE BLD-MCNC: 101 MG/DL (ref 70–99)
GLUCOSE BLD-MCNC: 103 MG/DL (ref 70–99)
GLUCOSE BLD-MCNC: 104 MG/DL (ref 70–99)
GLUCOSE BLD-MCNC: 106 MG/DL (ref 70–99)
GLUCOSE BLD-MCNC: 106 MG/DL (ref 70–99)
GLUCOSE BLD-MCNC: 109 MG/DL (ref 70–99)
GLUCOSE BLD-MCNC: 113 MG/DL (ref 70–99)
GLUCOSE BLD-MCNC: 114 MG/DL (ref 74–100)
GLUCOSE BLD-MCNC: 117 MG/DL (ref 70–99)
GLUCOSE BLD-MCNC: 122 MG/DL (ref 70–99)
GLUCOSE BLD-MCNC: 123 MG/DL (ref 65–105)
GLUCOSE BLD-MCNC: 127 MG/DL (ref 70–99)
GLUCOSE BLD-MCNC: 130 MG/DL (ref 70–99)
GLUCOSE BLD-MCNC: 134 MG/DL (ref 70–99)
GLUCOSE BLD-MCNC: 135 MG/DL (ref 70–99)
GLUCOSE BLD-MCNC: 140 MG/DL (ref 70–99)
GLUCOSE BLD-MCNC: 155 MG/DL (ref 70–99)
GLUCOSE BLD-MCNC: 159 MG/DL (ref 70–99)
GLUCOSE BLD-MCNC: 83 MG/DL (ref 65–105)
GLUCOSE BLD-MCNC: 86 MG/DL (ref 65–105)
GLUCOSE BLD-MCNC: 87 MG/DL (ref 70–99)
GLUCOSE BLD-MCNC: 88 MG/DL (ref 70–99)
GLUCOSE BLD-MCNC: 95 MG/DL (ref 65–105)
GLUCOSE BLD-MCNC: 95 MG/DL (ref 70–99)
GLUCOSE BLD-MCNC: 96 MG/DL (ref 65–105)
GLUCOSE BLD-MCNC: 96 MG/DL (ref 70–99)
GLUCOSE BLD-MCNC: 96 MG/DL (ref 70–99)
GLUCOSE BLD-MCNC: 97 MG/DL (ref 70–99)
GLUCOSE BLD-MCNC: 98 MG/DL (ref 70–99)
GLUCOSE BLD-MCNC: 98 MG/DL (ref 70–99)
GLUCOSE BLD-MCNC: 99 MG/DL (ref 70–99)
GLUCOSE URINE: NEGATIVE
HBA1C MFR BLD: 5.6 % (ref 4–6)
HCO3 VENOUS: 22.4 MMOL/L (ref 22–29)
HCT VFR BLD CALC: 19.5 % (ref 36–46)
HCT VFR BLD CALC: 21.4 % (ref 36–46)
HCT VFR BLD CALC: 21.9 % (ref 36.3–47.1)
HCT VFR BLD CALC: 22 % (ref 36–46)
HCT VFR BLD CALC: 22.4 % (ref 36.3–47.1)
HCT VFR BLD CALC: 22.7 % (ref 36.3–47.1)
HCT VFR BLD CALC: 22.7 % (ref 36.3–47.1)
HCT VFR BLD CALC: 22.9 % (ref 36.3–47.1)
HCT VFR BLD CALC: 23 % (ref 36.3–47.1)
HCT VFR BLD CALC: 23.4 % (ref 36.3–47.1)
HCT VFR BLD CALC: 23.5 % (ref 36.3–47.1)
HCT VFR BLD CALC: 23.5 % (ref 36.3–47.1)
HCT VFR BLD CALC: 23.6 % (ref 36.3–47.1)
HCT VFR BLD CALC: 23.8 % (ref 36.3–47.1)
HCT VFR BLD CALC: 23.8 % (ref 36–46)
HCT VFR BLD CALC: 24 % (ref 36.3–47.1)
HCT VFR BLD CALC: 24.1 % (ref 36.3–47.1)
HCT VFR BLD CALC: 24.1 % (ref 36.3–47.1)
HCT VFR BLD CALC: 24.2 % (ref 36.3–47.1)
HCT VFR BLD CALC: 24.2 % (ref 36.3–47.1)
HCT VFR BLD CALC: 24.4 % (ref 36.3–47.1)
HCT VFR BLD CALC: 24.6 % (ref 36.3–47.1)
HCT VFR BLD CALC: 24.7 % (ref 36–46)
HCT VFR BLD CALC: 24.8 % (ref 36.3–47.1)
HCT VFR BLD CALC: 24.9 % (ref 36.3–47.1)
HCT VFR BLD CALC: 25.2 % (ref 36.3–47.1)
HCT VFR BLD CALC: 25.3 % (ref 36.3–47.1)
HCT VFR BLD CALC: 25.4 % (ref 36.3–47.1)
HCT VFR BLD CALC: 25.5 % (ref 36.3–47.1)
HCT VFR BLD CALC: 25.6 % (ref 36.3–47.1)
HCT VFR BLD CALC: 25.8 % (ref 36–46)
HCT VFR BLD CALC: 25.9 % (ref 36.3–47.1)
HCT VFR BLD CALC: 26 % (ref 36.3–47.1)
HCT VFR BLD CALC: 26.1 % (ref 36.3–47.1)
HCT VFR BLD CALC: 26.1 % (ref 36.3–47.1)
HCT VFR BLD CALC: 26.2 % (ref 36.3–47.1)
HCT VFR BLD CALC: 26.2 % (ref 36.3–47.1)
HCT VFR BLD CALC: 26.4 % (ref 36.3–47.1)
HCT VFR BLD CALC: 26.6 % (ref 36.3–47.1)
HCT VFR BLD CALC: 27.1 % (ref 36.3–47.1)
HCT VFR BLD CALC: 27.3 % (ref 36.3–47.1)
HCT VFR BLD CALC: 27.4 % (ref 36.3–47.1)
HCT VFR BLD CALC: 27.9 % (ref 36.3–47.1)
HCT VFR BLD CALC: 28.2 % (ref 36.3–47.1)
HCT VFR BLD CALC: 28.4 % (ref 36–46)
HCT VFR BLD CALC: 28.8 % (ref 36.3–47.1)
HCT VFR BLD CALC: 28.8 % (ref 36.3–47.1)
HCT VFR BLD CALC: 28.9 % (ref 36.3–47.1)
HCT VFR BLD CALC: 29.1 % (ref 36–46)
HCT VFR BLD CALC: 29.2 % (ref 36.3–47.1)
HCT VFR BLD CALC: 29.2 % (ref 36.3–47.1)
HCT VFR BLD CALC: 29.2 % (ref 36–46)
HCT VFR BLD CALC: 30.5 % (ref 36.3–47.1)
HCT VFR BLD CALC: 33.5 % (ref 36.3–47.1)
HCT VFR BLD CALC: 35 % (ref 36.3–47.1)
HCT VFR BLD CALC: 37.2 % (ref 36.3–47.1)
HCT VFR BLD CALC: 37.6 % (ref 36–46)
HCT VFR BLD CALC: 38.2 % (ref 36–46)
HCT VFR BLD CALC: 39.4 % (ref 36–46)
HDLC SERPL-MCNC: 37 MG/DL
HEMOCCULT SP1 STL QL: NEGATIVE
HEMOCCULT SP1 STL QL: POSITIVE
HEMOCCULT SP2 STL QL: ABNORMAL
HEMOCCULT SP2 STL QL: NORMAL
HEMOCCULT SP3 STL QL: ABNORMAL
HEMOCCULT SP3 STL QL: NORMAL
HEMOGLOBIN: 10.3 G/DL (ref 11.9–15.1)
HEMOGLOBIN: 10.8 G/DL (ref 11.9–15.1)
HEMOGLOBIN: 11.4 G/DL (ref 11.9–15.1)
HEMOGLOBIN: 12.4 G/DL (ref 12–16)
HEMOGLOBIN: 12.9 G/DL (ref 12–16)
HEMOGLOBIN: 13.6 G/DL (ref 12–16)
HEMOGLOBIN: 6.3 G/DL (ref 12–16)
HEMOGLOBIN: 6.6 G/DL (ref 11.9–15.1)
HEMOGLOBIN: 6.8 G/DL (ref 12–16)
HEMOGLOBIN: 6.9 G/DL (ref 11.9–15.1)
HEMOGLOBIN: 7 G/DL (ref 11.9–15.1)
HEMOGLOBIN: 7.1 G/DL (ref 11.9–15.1)
HEMOGLOBIN: 7.1 G/DL (ref 11.9–15.1)
HEMOGLOBIN: 7.1 G/DL (ref 12–16)
HEMOGLOBIN: 7.2 G/DL (ref 11.9–15.1)
HEMOGLOBIN: 7.2 G/DL (ref 12–16)
HEMOGLOBIN: 7.3 G/DL (ref 11.9–15.1)
HEMOGLOBIN: 7.5 G/DL (ref 11.9–15.1)
HEMOGLOBIN: 7.6 G/DL (ref 11.9–15.1)
HEMOGLOBIN: 7.7 G/DL (ref 11.9–15.1)
HEMOGLOBIN: 7.7 G/DL (ref 11.9–15.1)
HEMOGLOBIN: 7.8 G/DL (ref 11.9–15.1)
HEMOGLOBIN: 7.9 G/DL (ref 11.9–15.1)
HEMOGLOBIN: 7.9 G/DL (ref 12–16)
HEMOGLOBIN: 8 G/DL (ref 11.9–15.1)
HEMOGLOBIN: 8 G/DL (ref 12–16)
HEMOGLOBIN: 8.1 G/DL (ref 11.9–15.1)
HEMOGLOBIN: 8.2 G/DL (ref 11.9–15.1)
HEMOGLOBIN: 8.2 G/DL (ref 11.9–15.1)
HEMOGLOBIN: 8.3 G/DL (ref 11.9–15.1)
HEMOGLOBIN: 8.3 G/DL (ref 11.9–15.1)
HEMOGLOBIN: 8.4 G/DL (ref 11.9–15.1)
HEMOGLOBIN: 8.5 G/DL (ref 11.9–15.1)
HEMOGLOBIN: 8.7 G/DL (ref 11.9–15.1)
HEMOGLOBIN: 8.7 G/DL (ref 12–16)
HEMOGLOBIN: 8.8 G/DL (ref 11.9–15.1)
HEMOGLOBIN: 8.8 G/DL (ref 11.9–15.1)
HEMOGLOBIN: 9.1 G/DL (ref 11.9–15.1)
HEMOGLOBIN: 9.1 G/DL (ref 12–16)
HEMOGLOBIN: 9.2 G/DL (ref 11.9–15.1)
HEMOGLOBIN: 9.5 G/DL (ref 12–16)
HEMOGLOBIN: 9.6 G/DL (ref 11.9–15.1)
HISTONE ANTIBODY: 30 U/ML
IMMATURE GRANULOCYTES: 0 %
IMMATURE GRANULOCYTES: 1 %
IMMATURE GRANULOCYTES: 2 %
IMMATURE GRANULOCYTES: ABNORMAL %
IMMATURE RETIC FRACT: 28.1 % (ref 2.7–18.3)
INR BLD: 1.3
INR BLD: 1.3
IRON SATURATION: 17 % (ref 20–55)
IRON: 29 UG/DL (ref 37–145)
KAPPA FREE LIGHT CHAINS QNT: 12.27 MG/DL (ref 0.37–1.94)
KETONES, URINE: NEGATIVE
LACTIC ACID: 2.4 MMOL/L (ref 0.5–2.2)
LAMBDA FREE LIGHT CHAINS QNT: 9.75 MG/DL (ref 0.57–2.63)
LDL CHOLESTEROL: 94 MG/DL (ref 0–130)
LEUKOCYTE ESTERASE, URINE: ABNORMAL
LIPASE: 31 U/L (ref 13–60)
LV EF: 60 %
LV EF: 65 %
LVEF MODALITY: NORMAL
LVEF MODALITY: NORMAL
LYMPHOCYTES # BLD: 10 % (ref 24–44)
LYMPHOCYTES # BLD: 11 % (ref 24–43)
LYMPHOCYTES # BLD: 12 % (ref 24–43)
LYMPHOCYTES # BLD: 13 % (ref 24–43)
LYMPHOCYTES # BLD: 13 % (ref 24–43)
LYMPHOCYTES # BLD: 14 % (ref 24–43)
LYMPHOCYTES # BLD: 15 % (ref 24–43)
LYMPHOCYTES # BLD: 16 % (ref 24–43)
LYMPHOCYTES # BLD: 3 % (ref 24–44)
LYMPHOCYTES # BLD: 5 % (ref 24–44)
LYMPHOCYTES # BLD: 6 % (ref 24–43)
LYMPHOCYTES # BLD: 6 % (ref 24–44)
LYMPHOCYTES # BLD: 6 % (ref 24–44)
LYMPHOCYTES # BLD: 7 % (ref 24–44)
Lab: ABNORMAL
Lab: NORMAL
MAGNESIUM: 2.1 MG/DL (ref 1.6–2.6)
MAGNESIUM: 2.1 MG/DL (ref 1.6–2.6)
MAGNESIUM: 2.3 MG/DL (ref 1.6–2.6)
MAGNESIUM: 2.4 MG/DL (ref 1.6–2.6)
MCH RBC QN AUTO: 29.6 PG (ref 26–34)
MCH RBC QN AUTO: 30.1 PG (ref 25.2–33.5)
MCH RBC QN AUTO: 30.3 PG (ref 25.2–33.5)
MCH RBC QN AUTO: 30.3 PG (ref 26–34)
MCH RBC QN AUTO: 30.7 PG (ref 25.2–33.5)
MCH RBC QN AUTO: 30.7 PG (ref 25.2–33.5)
MCH RBC QN AUTO: 30.8 PG (ref 25.2–33.5)
MCH RBC QN AUTO: 30.8 PG (ref 26–34)
MCH RBC QN AUTO: 30.9 PG (ref 25.2–33.5)
MCH RBC QN AUTO: 31.1 PG (ref 25.2–33.5)
MCH RBC QN AUTO: 31.1 PG (ref 25.2–33.5)
MCH RBC QN AUTO: 31.3 PG (ref 25.2–33.5)
MCH RBC QN AUTO: 31.7 PG (ref 25.2–33.5)
MCH RBC QN AUTO: 31.7 PG (ref 25.2–33.5)
MCH RBC QN AUTO: 31.9 PG (ref 26–34)
MCH RBC QN AUTO: 31.9 PG (ref 26–34)
MCH RBC QN AUTO: 32 PG (ref 25.2–33.5)
MCH RBC QN AUTO: 32 PG (ref 25.2–33.5)
MCH RBC QN AUTO: 32.3 PG (ref 26–34)
MCH RBC QN AUTO: 32.5 PG (ref 25.2–33.5)
MCH RBC QN AUTO: 33.1 PG (ref 26–34)
MCH RBC QN AUTO: 35 PG (ref 26–34)
MCHC RBC AUTO-ENTMCNC: 27.8 G/DL (ref 28.4–34.8)
MCHC RBC AUTO-ENTMCNC: 28.6 G/DL (ref 28.4–34.8)
MCHC RBC AUTO-ENTMCNC: 29 G/DL (ref 28.4–34.8)
MCHC RBC AUTO-ENTMCNC: 29.2 G/DL (ref 28.4–34.8)
MCHC RBC AUTO-ENTMCNC: 29.7 G/DL (ref 31–37)
MCHC RBC AUTO-ENTMCNC: 30 G/DL (ref 28.4–34.8)
MCHC RBC AUTO-ENTMCNC: 30.1 G/DL (ref 28.4–34.8)
MCHC RBC AUTO-ENTMCNC: 30.2 G/DL (ref 28.4–34.8)
MCHC RBC AUTO-ENTMCNC: 30.3 G/DL (ref 31–37)
MCHC RBC AUTO-ENTMCNC: 30.4 G/DL (ref 28.4–34.8)
MCHC RBC AUTO-ENTMCNC: 30.6 G/DL (ref 28.4–34.8)
MCHC RBC AUTO-ENTMCNC: 30.6 G/DL (ref 28.4–34.8)
MCHC RBC AUTO-ENTMCNC: 30.7 G/DL (ref 28.4–34.8)
MCHC RBC AUTO-ENTMCNC: 30.8 G/DL (ref 28.4–34.8)
MCHC RBC AUTO-ENTMCNC: 30.9 G/DL (ref 28.4–34.8)
MCHC RBC AUTO-ENTMCNC: 30.9 G/DL (ref 28.4–34.8)
MCHC RBC AUTO-ENTMCNC: 31 G/DL (ref 28.4–34.8)
MCHC RBC AUTO-ENTMCNC: 31 G/DL (ref 31–37)
MCHC RBC AUTO-ENTMCNC: 32.1 G/DL (ref 31–37)
MCHC RBC AUTO-ENTMCNC: 32.6 G/DL (ref 31–37)
MCHC RBC AUTO-ENTMCNC: 32.9 G/DL (ref 31–37)
MCHC RBC AUTO-ENTMCNC: 33.9 G/DL (ref 31–37)
MCHC RBC AUTO-ENTMCNC: 34.5 G/DL (ref 31–37)
MCV RBC AUTO: 100.3 FL (ref 82.6–102.9)
MCV RBC AUTO: 100.5 FL (ref 82.6–102.9)
MCV RBC AUTO: 101.9 FL (ref 82.6–102.9)
MCV RBC AUTO: 102.4 FL (ref 82.6–102.9)
MCV RBC AUTO: 102.6 FL (ref 82.6–102.9)
MCV RBC AUTO: 104 FL (ref 82.6–102.9)
MCV RBC AUTO: 104.1 FL (ref 82.6–102.9)
MCV RBC AUTO: 104.4 FL (ref 82.6–102.9)
MCV RBC AUTO: 106.1 FL (ref 82.6–102.9)
MCV RBC AUTO: 106.3 FL (ref 82.6–102.9)
MCV RBC AUTO: 106.6 FL (ref 80–100)
MCV RBC AUTO: 106.7 FL (ref 80–100)
MCV RBC AUTO: 107.4 FL (ref 80–100)
MCV RBC AUTO: 107.4 FL (ref 80–100)
MCV RBC AUTO: 108 FL (ref 82.6–102.9)
MCV RBC AUTO: 108.7 FL (ref 82.6–102.9)
MCV RBC AUTO: 111.2 FL (ref 82.6–102.9)
MCV RBC AUTO: 89.3 FL (ref 80–100)
MCV RBC AUTO: 89.5 FL (ref 80–100)
MCV RBC AUTO: 89.8 FL (ref 80–100)
MCV RBC AUTO: 98.3 FL (ref 82.6–102.9)
MCV RBC AUTO: 99.3 FL (ref 80–100)
MCV RBC AUTO: 99.6 FL (ref 82.6–102.9)
MCV RBC AUTO: 99.6 FL (ref 82.6–102.9)
MCV RBC AUTO: 99.7 FL (ref 82.6–102.9)
MODE: ABNORMAL
MONOCYTES # BLD: 2 % (ref 1–7)
MONOCYTES # BLD: 4 % (ref 1–7)
MONOCYTES # BLD: 5 % (ref 1–7)
MONOCYTES # BLD: 5 % (ref 3–12)
MONOCYTES # BLD: 6 % (ref 1–7)
MONOCYTES # BLD: 6 % (ref 3–12)
MONOCYTES # BLD: 6 % (ref 3–12)
MONOCYTES # BLD: 7 % (ref 3–12)
MONOCYTES # BLD: 8 % (ref 3–12)
MONOCYTES # BLD: 8 % (ref 3–12)
MORPHOLOGY: ABNORMAL
MUCUS: ABNORMAL
MUCUS: ABNORMAL
MUCUS: NORMAL
MYOGLOBIN: 253 NG/ML (ref 25–58)
N. GONORRHOEAE DNA: NEGATIVE
NEGATIVE BASE EXCESS, VEN: 1 (ref 0–2)
NITRITE, URINE: NEGATIVE
NITRITE, URINE: NEGATIVE
NITRITE, URINE: POSITIVE
NRBC AUTOMATED: 0 PER 100 WBC
NRBC AUTOMATED: 0.3 PER 100 WBC
NRBC AUTOMATED: 0.3 PER 100 WBC
NRBC AUTOMATED: 0.4 PER 100 WBC
NRBC AUTOMATED: 0.9 PER 100 WBC
NRBC AUTOMATED: 1.3 PER 100 WBC
NRBC AUTOMATED: 1.5 PER 100 WBC
NRBC AUTOMATED: ABNORMAL PER 100 WBC
O2 DEVICE/FLOW/%: ABNORMAL
O2 SAT, VEN: 75 % (ref 60–85)
OTHER OBSERVATIONS UA: ABNORMAL
OTHER OBSERVATIONS UA: ABNORMAL
OTHER OBSERVATIONS UA: NORMAL
PARTIAL THROMBOPLASTIN TIME: 23.7 SEC (ref 20.5–30.5)
PARTIAL THROMBOPLASTIN TIME: 33.9 SEC (ref 20.5–30.5)
PATIENT TEMP: ABNORMAL
PCO2, VEN: 31.5 MM HG (ref 41–51)
PDW BLD-RTO: 14.7 % (ref 11.5–14.9)
PDW BLD-RTO: 15 % (ref 11.5–14.9)
PDW BLD-RTO: 15.2 % (ref 11.8–14.4)
PDW BLD-RTO: 15.3 % (ref 11.5–14.9)
PDW BLD-RTO: 15.3 % (ref 11.8–14.4)
PDW BLD-RTO: 15.6 % (ref 11.8–14.4)
PDW BLD-RTO: 15.8 % (ref 11.8–14.4)
PDW BLD-RTO: 15.9 % (ref 11.8–14.4)
PDW BLD-RTO: 16.1 % (ref 11.8–14.4)
PDW BLD-RTO: 16.4 % (ref 11.8–14.4)
PDW BLD-RTO: 17.1 % (ref 11.8–14.4)
PDW BLD-RTO: 17.2 % (ref 11.8–14.4)
PDW BLD-RTO: 17.6 % (ref 11.8–14.4)
PDW BLD-RTO: 19.3 % (ref 11.8–14.4)
PDW BLD-RTO: 19.6 % (ref 11.8–14.4)
PDW BLD-RTO: 19.7 % (ref 11.8–14.4)
PDW BLD-RTO: 19.7 % (ref 11.8–14.4)
PDW BLD-RTO: 20.1 % (ref 11.8–14.4)
PDW BLD-RTO: 20.4 % (ref 11.8–14.4)
PDW BLD-RTO: 20.6 % (ref 11.8–14.4)
PDW BLD-RTO: 25 % (ref 11.5–14.9)
PDW BLD-RTO: 25.3 % (ref 11.5–14.9)
PDW BLD-RTO: 26.1 % (ref 11.5–14.9)
PDW BLD-RTO: 26.2 % (ref 11.5–14.9)
PDW BLD-RTO: 26.8 % (ref 11.5–14.9)
PH UA: 5 (ref 5–8)
PH UA: 7.5 (ref 5–8)
PH UA: 8 (ref 5–8)
PH VENOUS: 7.46 (ref 7.32–7.43)
PLATELET # BLD: 137 K/UL (ref 150–450)
PLATELET # BLD: 176 K/UL (ref 150–450)
PLATELET # BLD: 177 K/UL (ref 150–450)
PLATELET # BLD: 180 K/UL (ref 138–453)
PLATELET # BLD: 196 K/UL (ref 138–453)
PLATELET # BLD: 198 K/UL (ref 150–450)
PLATELET # BLD: 221 K/UL (ref 150–450)
PLATELET # BLD: 232 K/UL (ref 138–453)
PLATELET # BLD: 253 K/UL (ref 138–453)
PLATELET # BLD: 253 K/UL (ref 138–453)
PLATELET # BLD: 261 K/UL (ref 138–453)
PLATELET # BLD: 270 K/UL (ref 138–453)
PLATELET # BLD: 276 K/UL (ref 150–450)
PLATELET # BLD: 276 K/UL (ref 150–450)
PLATELET # BLD: 277 K/UL (ref 138–453)
PLATELET # BLD: 279 K/UL (ref 138–453)
PLATELET # BLD: 282 K/UL (ref 138–453)
PLATELET # BLD: 283 K/UL (ref 138–453)
PLATELET # BLD: 284 K/UL (ref 138–453)
PLATELET # BLD: 284 K/UL (ref 138–453)
PLATELET # BLD: 288 K/UL (ref 138–453)
PLATELET # BLD: 292 K/UL (ref 138–453)
PLATELET # BLD: 322 K/UL (ref 138–453)
PLATELET # BLD: 327 K/UL (ref 138–453)
PLATELET # BLD: 330 K/UL (ref 150–450)
PLATELET ESTIMATE: ABNORMAL
PMV BLD AUTO: 6.7 FL (ref 6–12)
PMV BLD AUTO: 6.9 FL (ref 6–12)
PMV BLD AUTO: 7 FL (ref 6–12)
PMV BLD AUTO: 7.3 FL (ref 6–12)
PMV BLD AUTO: 7.3 FL (ref 6–12)
PMV BLD AUTO: 7.5 FL (ref 6–12)
PMV BLD AUTO: 7.7 FL (ref 6–12)
PMV BLD AUTO: 8.1 FL (ref 6–12)
PMV BLD AUTO: 9.2 FL (ref 8.1–13.5)
PMV BLD AUTO: 9.3 FL (ref 8.1–13.5)
PMV BLD AUTO: 9.4 FL (ref 8.1–13.5)
PMV BLD AUTO: 9.5 FL (ref 8.1–13.5)
PMV BLD AUTO: 9.5 FL (ref 8.1–13.5)
PMV BLD AUTO: 9.6 FL (ref 8.1–13.5)
PMV BLD AUTO: 9.7 FL (ref 8.1–13.5)
PMV BLD AUTO: 9.8 FL (ref 8.1–13.5)
PMV BLD AUTO: 9.8 FL (ref 8.1–13.5)
PMV BLD AUTO: 9.9 FL (ref 8.1–13.5)
PO2, VEN: 36.9 MM HG (ref 30–50)
POC CHLORIDE: 102 MMOL/L (ref 98–107)
POC CREATININE: 0.91 MG/DL (ref 0.51–1.19)
POC HEMATOCRIT: 32 % (ref 36–46)
POC HEMOGLOBIN: 10.9 G/DL (ref 12–16)
POC IONIZED CALCIUM: 1.13 MMOL/L (ref 1.15–1.33)
POC LACTIC ACID: 1.33 MMOL/L (ref 0.56–1.39)
POC PCO2 TEMP: ABNORMAL MM HG
POC PH TEMP: ABNORMAL
POC PO2 TEMP: ABNORMAL MM HG
POC POTASSIUM: 4.2 MMOL/L (ref 3.5–4.5)
POC SODIUM: 136 MMOL/L (ref 138–146)
POSITIVE BASE EXCESS, VEN: ABNORMAL (ref 0–3)
POTASSIUM SERPL-SCNC: 3.5 MMOL/L (ref 3.7–5.3)
POTASSIUM SERPL-SCNC: 3.5 MMOL/L (ref 3.7–5.3)
POTASSIUM SERPL-SCNC: 3.8 MMOL/L (ref 3.7–5.3)
POTASSIUM SERPL-SCNC: 3.8 MMOL/L (ref 3.7–5.3)
POTASSIUM SERPL-SCNC: 3.9 MMOL/L (ref 3.7–5.3)
POTASSIUM SERPL-SCNC: 4 MMOL/L (ref 3.7–5.3)
POTASSIUM SERPL-SCNC: 4.1 MMOL/L (ref 3.7–5.3)
POTASSIUM SERPL-SCNC: 4.2 MMOL/L (ref 3.7–5.3)
POTASSIUM SERPL-SCNC: 4.3 MMOL/L (ref 3.7–5.3)
POTASSIUM SERPL-SCNC: 4.4 MMOL/L (ref 3.7–5.3)
POTASSIUM SERPL-SCNC: 4.6 MMOL/L (ref 3.7–5.3)
POTASSIUM SERPL-SCNC: 4.6 MMOL/L (ref 3.7–5.3)
POTASSIUM SERPL-SCNC: 4.7 MMOL/L (ref 3.7–5.3)
POTASSIUM SERPL-SCNC: 5.2 MMOL/L (ref 3.7–5.3)
PRO-BNP: 1080 PG/ML
PRO-BNP: 3737 PG/ML
PRO-BNP: 4653 PG/ML
PRO-BNP: 9240 PG/ML
PROCALCITONIN: 0.09 NG/ML
PROTEIN UA: ABNORMAL
PROTEIN UA: NEGATIVE
PROTEIN UA: NEGATIVE
PROTHROMBIN TIME: 13.3 SEC (ref 9–12)
PROTHROMBIN TIME: 13.4 SEC (ref 9–12)
RBC # BLD: 1.81 M/UL (ref 4–5.2)
RBC # BLD: 2.22 M/UL (ref 3.95–5.11)
RBC # BLD: 2.24 M/UL (ref 4–5.2)
RBC # BLD: 2.25 M/UL (ref 3.95–5.11)
RBC # BLD: 2.28 M/UL (ref 3.95–5.11)
RBC # BLD: 2.34 M/UL (ref 3.95–5.11)
RBC # BLD: 2.37 M/UL (ref 3.95–5.11)
RBC # BLD: 2.39 M/UL (ref 3.95–5.11)
RBC # BLD: 2.41 M/UL (ref 3.95–5.11)
RBC # BLD: 2.42 M/UL (ref 4–5.2)
RBC # BLD: 2.43 M/UL (ref 3.95–5.11)
RBC # BLD: 2.49 M/UL (ref 3.95–5.11)
RBC # BLD: 2.49 M/UL (ref 4–5.2)
RBC # BLD: 2.59 M/UL (ref 3.95–5.11)
RBC # BLD: 2.68 M/UL (ref 3.95–5.11)
RBC # BLD: 2.72 M/UL (ref 4–5.2)
RBC # BLD: 2.83 M/UL (ref 3.95–5.11)
RBC # BLD: 3.34 M/UL (ref 3.95–5.11)
RBC # BLD: 3.51 M/UL (ref 3.95–5.11)
RBC # BLD: 3.7 M/UL (ref 3.95–5.11)
RBC # BLD: 4.18 M/UL (ref 4–5.2)
RBC # BLD: 4.27 M/UL (ref 4–5.2)
RBC # BLD: 4.4 M/UL (ref 4–5.2)
RBC # BLD: ABNORMAL 10*6/UL
RBC UA: ABNORMAL /HPF
RBC UA: ABNORMAL /HPF
RBC UA: NORMAL /HPF (ref 0–4)
REASON FOR REJECTION: NORMAL
RENAL EPITHELIAL, UA: ABNORMAL /HPF
RENAL EPITHELIAL, UA: ABNORMAL /HPF
RENAL EPITHELIAL, UA: NORMAL /HPF
RENIN ACTIVITY: 1.6 NG/ML/HR
RENIN COMMENT: NORMAL
RETIC %: 3.3 % (ref 0.5–1.9)
RETIC HEMOGLOBIN: 34.1 PG (ref 28.2–35.7)
SAMPLE SITE: ABNORMAL
SEG NEUTROPHILS: 72 % (ref 36–65)
SEG NEUTROPHILS: 74 % (ref 36–65)
SEG NEUTROPHILS: 74 % (ref 36–65)
SEG NEUTROPHILS: 75 % (ref 36–65)
SEG NEUTROPHILS: 75 % (ref 36–65)
SEG NEUTROPHILS: 77 % (ref 36–65)
SEG NEUTROPHILS: 78 % (ref 36–65)
SEG NEUTROPHILS: 81 % (ref 36–65)
SEG NEUTROPHILS: 81 % (ref 36–66)
SEG NEUTROPHILS: 87 % (ref 36–66)
SEG NEUTROPHILS: 88 % (ref 36–65)
SEG NEUTROPHILS: 88 % (ref 36–66)
SEG NEUTROPHILS: 89 % (ref 36–66)
SEG NEUTROPHILS: 91 % (ref 36–66)
SEG NEUTROPHILS: 92 % (ref 36–66)
SEGMENTED NEUTROPHILS ABSOLUTE COUNT: 10.05 K/UL (ref 1.5–8.1)
SEGMENTED NEUTROPHILS ABSOLUTE COUNT: 11.95 K/UL (ref 1.5–8.1)
SEGMENTED NEUTROPHILS ABSOLUTE COUNT: 5.53 K/UL (ref 1.5–8.1)
SEGMENTED NEUTROPHILS ABSOLUTE COUNT: 5.69 K/UL (ref 1.5–8.1)
SEGMENTED NEUTROPHILS ABSOLUTE COUNT: 5.7 K/UL (ref 1.3–9.1)
SEGMENTED NEUTROPHILS ABSOLUTE COUNT: 5.73 K/UL (ref 1.5–8.1)
SEGMENTED NEUTROPHILS ABSOLUTE COUNT: 5.77 K/UL (ref 1.5–8.1)
SEGMENTED NEUTROPHILS ABSOLUTE COUNT: 5.81 K/UL (ref 1.5–8.1)
SEGMENTED NEUTROPHILS ABSOLUTE COUNT: 5.83 K/UL (ref 1.3–9.1)
SEGMENTED NEUTROPHILS ABSOLUTE COUNT: 5.91 K/UL (ref 1.5–8.1)
SEGMENTED NEUTROPHILS ABSOLUTE COUNT: 6.05 K/UL (ref 1.5–8.1)
SEGMENTED NEUTROPHILS ABSOLUTE COUNT: 6.11 K/UL (ref 1.5–8.1)
SEGMENTED NEUTROPHILS ABSOLUTE COUNT: 6.21 K/UL (ref 1.5–8.1)
SEGMENTED NEUTROPHILS ABSOLUTE COUNT: 6.63 K/UL (ref 1.5–8.1)
SEGMENTED NEUTROPHILS ABSOLUTE COUNT: 6.64 K/UL (ref 1.3–9.1)
SEGMENTED NEUTROPHILS ABSOLUTE COUNT: 6.69 K/UL (ref 1.3–9.1)
SEGMENTED NEUTROPHILS ABSOLUTE COUNT: 6.93 K/UL (ref 1.5–8.1)
SEGMENTED NEUTROPHILS ABSOLUTE COUNT: 7.03 K/UL (ref 1.3–9.1)
SEGMENTED NEUTROPHILS ABSOLUTE COUNT: 7.44 K/UL (ref 1.5–8.1)
SEGMENTED NEUTROPHILS ABSOLUTE COUNT: 7.91 K/UL (ref 1.3–9.1)
SODIUM BLD-SCNC: 133 MMOL/L (ref 135–144)
SODIUM BLD-SCNC: 135 MMOL/L (ref 135–144)
SODIUM BLD-SCNC: 136 MMOL/L (ref 135–144)
SODIUM BLD-SCNC: 137 MMOL/L (ref 135–144)
SODIUM BLD-SCNC: 137 MMOL/L (ref 135–144)
SODIUM BLD-SCNC: 138 MMOL/L (ref 135–144)
SODIUM BLD-SCNC: 139 MMOL/L (ref 135–144)
SODIUM BLD-SCNC: 140 MMOL/L (ref 135–144)
SODIUM BLD-SCNC: 141 MMOL/L (ref 135–144)
SODIUM BLD-SCNC: 142 MMOL/L (ref 135–144)
SODIUM BLD-SCNC: 142 MMOL/L (ref 135–144)
SODIUM BLD-SCNC: 143 MMOL/L (ref 135–144)
SODIUM BLD-SCNC: 145 MMOL/L (ref 135–144)
SODIUM BLD-SCNC: 148 MMOL/L (ref 135–144)
SODIUM BLD-SCNC: 149 MMOL/L (ref 135–144)
SODIUM,UR: <20 MMOL/L
SPECIFIC GRAVITY UA: 1.01 (ref 1–1.03)
SPECIFIC GRAVITY UA: 1.01 (ref 1–1.03)
SPECIFIC GRAVITY UA: 1.02 (ref 1–1.03)
SPECIMEN DESCRIPTION: ABNORMAL
SPECIMEN DESCRIPTION: NORMAL
SURGICAL PATHOLOGY REPORT: NORMAL
THYROID PEROXIDASE (TPO) AB: 262 IU/ML (ref 0–35)
THYROXINE, FREE: 0.72 NG/DL (ref 0.93–1.7)
THYROXINE, FREE: 0.93 NG/DL (ref 0.93–1.7)
THYROXINE, FREE: 1.34 NG/DL (ref 0.93–1.7)
TIME, STOOL #1: 1600
TIME, STOOL #1: 2045
TIME, STOOL #1: 2200
TIME, STOOL #1: 600
TIME, STOOL #2: ABNORMAL
TIME, STOOL #2: NORMAL
TIME, STOOL #3: ABNORMAL
TIME, STOOL #3: NORMAL
TOTAL CK: 49 U/L (ref 26–192)
TOTAL CK: 70 U/L (ref 26–192)
TOTAL CO2, VENOUS: 23 MMOL/L (ref 23–30)
TOTAL IRON BINDING CAPACITY: 170 UG/DL (ref 250–450)
TOTAL PROTEIN: 5.5 G/DL (ref 6.4–8.3)
TOTAL PROTEIN: 5.8 G/DL (ref 6.4–8.3)
TOTAL PROTEIN: 7.7 G/DL (ref 6.4–8.3)
TRANSFUSION STATUS: NORMAL
TRICHOMONAS: ABNORMAL
TRICHOMONAS: ABNORMAL
TRICHOMONAS: NORMAL
TRIGL SERPL-MCNC: 130 MG/DL
TROPONIN INTERP: ABNORMAL
TROPONIN T: ABNORMAL NG/ML
TROPONIN, HIGH SENSITIVITY: 242 NG/L (ref 0–14)
TROPONIN, HIGH SENSITIVITY: 253 NG/L (ref 0–14)
TROPONIN, HIGH SENSITIVITY: 32 NG/L (ref 0–14)
TROPONIN, HIGH SENSITIVITY: 37 NG/L (ref 0–14)
TROPONIN, HIGH SENSITIVITY: 41 NG/L (ref 0–14)
TROPONIN, HIGH SENSITIVITY: 41 NG/L (ref 0–14)
TROPONIN, HIGH SENSITIVITY: 44 NG/L (ref 0–14)
TROPONIN, HIGH SENSITIVITY: 53 NG/L (ref 0–14)
TROPONIN, HIGH SENSITIVITY: 54 NG/L (ref 0–14)
TROPONIN, HIGH SENSITIVITY: 55 NG/L (ref 0–14)
TROPONIN, HIGH SENSITIVITY: 56 NG/L (ref 0–14)
TSH SERPL DL<=0.05 MIU/L-ACNC: 5.59 MIU/L (ref 0.3–5)
TSH SERPL DL<=0.05 MIU/L-ACNC: 6.76 MIU/L (ref 0.3–5)
TSH SERPL DL<=0.05 MIU/L-ACNC: 6.95 MIU/L (ref 0.3–5)
TSH SERPL DL<=0.05 MIU/L-ACNC: 9.78 MIU/L (ref 0.3–5)
TURBIDITY: ABNORMAL
UNIT DIVISION: 0
UNIT NUMBER: NORMAL
UNSATURATED IRON BINDING CAPACITY: 141 UG/DL (ref 112–347)
URINE HGB: ABNORMAL
URINE HGB: NEGATIVE
URINE HGB: NEGATIVE
UROBILINOGEN, URINE: NORMAL
VLDLC SERPL CALC-MCNC: ABNORMAL MG/DL (ref 1–30)
WBC # BLD: 10.2 K/UL (ref 3.5–11.3)
WBC # BLD: 12.9 K/UL (ref 3.5–11.3)
WBC # BLD: 13.6 K/UL (ref 3.5–11.3)
WBC # BLD: 6.1 K/UL (ref 3.5–11)
WBC # BLD: 6.7 K/UL (ref 3.5–11)
WBC # BLD: 7 K/UL (ref 3.5–11)
WBC # BLD: 7.1 K/UL (ref 3.5–11)
WBC # BLD: 7.2 K/UL (ref 3.5–11.3)
WBC # BLD: 7.3 K/UL (ref 3.5–11)
WBC # BLD: 7.5 K/UL (ref 3.5–11.3)
WBC # BLD: 7.6 K/UL (ref 3.5–11)
WBC # BLD: 7.6 K/UL (ref 3.5–11.3)
WBC # BLD: 7.7 K/UL (ref 3.5–11.3)
WBC # BLD: 7.8 K/UL (ref 3.5–11.3)
WBC # BLD: 7.8 K/UL (ref 3.5–11.3)
WBC # BLD: 7.9 K/UL (ref 3.5–11)
WBC # BLD: 8 K/UL (ref 3.5–11.3)
WBC # BLD: 8.3 K/UL (ref 3.5–11.3)
WBC # BLD: 8.5 K/UL (ref 3.5–11.3)
WBC # BLD: 8.5 K/UL (ref 3.5–11.3)
WBC # BLD: 8.6 K/UL (ref 3.5–11)
WBC # BLD: 9.2 K/UL (ref 3.5–11.3)
WBC # BLD: 9.2 K/UL (ref 3.5–11.3)
WBC # BLD: ABNORMAL 10*3/UL
WBC UA: ABNORMAL /HPF
WBC UA: ABNORMAL /HPF
WBC UA: NORMAL /HPF (ref 0–5)
YEAST: ABNORMAL
YEAST: ABNORMAL
YEAST: NORMAL
ZZ NTE CLEAN UP: ORDERED TEST: NORMAL
ZZ NTE WITH NAME CLEAN UP: SPECIMEN SOURCE: NORMAL

## 2019-01-01 PROCEDURE — 82436 ASSAY OF URINE CHLORIDE: CPT

## 2019-01-01 PROCEDURE — 6370000000 HC RX 637 (ALT 250 FOR IP): Performed by: STUDENT IN AN ORGANIZED HEALTH CARE EDUCATION/TRAINING PROGRAM

## 2019-01-01 PROCEDURE — 99232 SBSQ HOSP IP/OBS MODERATE 35: CPT | Performed by: INTERNAL MEDICINE

## 2019-01-01 PROCEDURE — 36415 COLL VENOUS BLD VENIPUNCTURE: CPT

## 2019-01-01 PROCEDURE — 77412 RADIATION TX DELIVERY LVL 3: CPT | Performed by: RADIOLOGY

## 2019-01-01 PROCEDURE — 97110 THERAPEUTIC EXERCISES: CPT

## 2019-01-01 PROCEDURE — APPNB30 APP NON BILLABLE TIME 0-30 MINS: Performed by: NURSE PRACTITIONER

## 2019-01-01 PROCEDURE — 99232 SBSQ HOSP IP/OBS MODERATE 35: CPT | Performed by: OBSTETRICS & GYNECOLOGY

## 2019-01-01 PROCEDURE — 83883 ASSAY NEPHELOMETRY NOT SPEC: CPT

## 2019-01-01 PROCEDURE — 88305 TISSUE EXAM BY PATHOLOGIST: CPT

## 2019-01-01 PROCEDURE — 87804 INFLUENZA ASSAY W/OPTIC: CPT

## 2019-01-01 PROCEDURE — 36430 TRANSFUSION BLD/BLD COMPNT: CPT

## 2019-01-01 PROCEDURE — 82947 ASSAY GLUCOSE BLOOD QUANT: CPT

## 2019-01-01 PROCEDURE — 20206 BIOPSY MUSCLE PERQ NEEDLE: CPT

## 2019-01-01 PROCEDURE — C9113 INJ PANTOPRAZOLE SODIUM, VIA: HCPCS | Performed by: FAMILY MEDICINE

## 2019-01-01 PROCEDURE — 6360000002 HC RX W HCPCS: Performed by: STUDENT IN AN ORGANIZED HEALTH CARE EDUCATION/TRAINING PROGRAM

## 2019-01-01 PROCEDURE — 82378 CARCINOEMBRYONIC ANTIGEN: CPT

## 2019-01-01 PROCEDURE — 99212 OFFICE O/P EST SF 10 MIN: CPT | Performed by: INTERNAL MEDICINE

## 2019-01-01 PROCEDURE — 87086 URINE CULTURE/COLONY COUNT: CPT

## 2019-01-01 PROCEDURE — 97535 SELF CARE MNGMENT TRAINING: CPT

## 2019-01-01 PROCEDURE — 80048 BASIC METABOLIC PNL TOTAL CA: CPT

## 2019-01-01 PROCEDURE — 3017F COLORECTAL CA SCREEN DOC REV: CPT | Performed by: INTERNAL MEDICINE

## 2019-01-01 PROCEDURE — 6360000002 HC RX W HCPCS: Performed by: FAMILY MEDICINE

## 2019-01-01 PROCEDURE — 6360000002 HC RX W HCPCS: Performed by: INTERNAL MEDICINE

## 2019-01-01 PROCEDURE — 2060000000 HC ICU INTERMEDIATE R&B

## 2019-01-01 PROCEDURE — 06H03DZ INSERTION OF INTRALUMINAL DEVICE INTO INFERIOR VENA CAVA, PERCUTANEOUS APPROACH: ICD-10-PCS | Performed by: RADIOLOGY

## 2019-01-01 PROCEDURE — 85025 COMPLETE CBC W/AUTO DIFF WBC: CPT

## 2019-01-01 PROCEDURE — 88342 IMHCHEM/IMCYTCHM 1ST ANTB: CPT

## 2019-01-01 PROCEDURE — 2580000003 HC RX 258: Performed by: RADIOLOGY

## 2019-01-01 PROCEDURE — 3700000000 HC ANESTHESIA ATTENDED CARE: Performed by: INTERNAL MEDICINE

## 2019-01-01 PROCEDURE — 2580000003 HC RX 258: Performed by: STUDENT IN AN ORGANIZED HEALTH CARE EDUCATION/TRAINING PROGRAM

## 2019-01-01 PROCEDURE — 97530 THERAPEUTIC ACTIVITIES: CPT

## 2019-01-01 PROCEDURE — 1200000000 HC SEMI PRIVATE

## 2019-01-01 PROCEDURE — 83036 HEMOGLOBIN GLYCOSYLATED A1C: CPT

## 2019-01-01 PROCEDURE — 2709999900 HC NON-CHARGEABLE SUPPLY

## 2019-01-01 PROCEDURE — 94761 N-INVAS EAR/PLS OXIMETRY MLT: CPT

## 2019-01-01 PROCEDURE — 80053 COMPREHEN METABOLIC PANEL: CPT

## 2019-01-01 PROCEDURE — 85018 HEMOGLOBIN: CPT

## 2019-01-01 PROCEDURE — 0UBC7ZX EXCISION OF CERVIX, VIA NATURAL OR ARTIFICIAL OPENING, DIAGNOSTIC: ICD-10-PCS | Performed by: OBSTETRICS & GYNECOLOGY

## 2019-01-01 PROCEDURE — G0009 ADMIN PNEUMOCOCCAL VACCINE: HCPCS | Performed by: INTERNAL MEDICINE

## 2019-01-01 PROCEDURE — 4040F PNEUMOC VAC/ADMIN/RCVD: CPT | Performed by: NURSE PRACTITIONER

## 2019-01-01 PROCEDURE — 1090F PRES/ABSN URINE INCON ASSESS: CPT | Performed by: NURSE PRACTITIONER

## 2019-01-01 PROCEDURE — 84443 ASSAY THYROID STIM HORMONE: CPT

## 2019-01-01 PROCEDURE — 6370000000 HC RX 637 (ALT 250 FOR IP): Performed by: INTERNAL MEDICINE

## 2019-01-01 PROCEDURE — G8400 PT W/DXA NO RESULTS DOC: HCPCS | Performed by: NURSE PRACTITIONER

## 2019-01-01 PROCEDURE — 87660 TRICHOMONAS VAGIN DIR PROBE: CPT

## 2019-01-01 PROCEDURE — 2580000003 HC RX 258: Performed by: INTERNAL MEDICINE

## 2019-01-01 PROCEDURE — 77336 RADIATION PHYSICS CONSULT: CPT | Performed by: RADIOLOGY

## 2019-01-01 PROCEDURE — 99233 SBSQ HOSP IP/OBS HIGH 50: CPT | Performed by: INTERNAL MEDICINE

## 2019-01-01 PROCEDURE — P9016 RBC LEUKOCYTES REDUCED: HCPCS

## 2019-01-01 PROCEDURE — 0UBG7ZZ EXCISION OF VAGINA, VIA NATURAL OR ARTIFICIAL OPENING: ICD-10-PCS | Performed by: INTERNAL MEDICINE

## 2019-01-01 PROCEDURE — 84145 PROCALCITONIN (PCT): CPT

## 2019-01-01 PROCEDURE — 3700000001 HC ADD 15 MINUTES (ANESTHESIA): Performed by: OBSTETRICS & GYNECOLOGY

## 2019-01-01 PROCEDURE — 86920 COMPATIBILITY TEST SPIN: CPT

## 2019-01-01 PROCEDURE — 97116 GAIT TRAINING THERAPY: CPT

## 2019-01-01 PROCEDURE — 4040F PNEUMOC VAC/ADMIN/RCVD: CPT | Performed by: INTERNAL MEDICINE

## 2019-01-01 PROCEDURE — 1111F DSCHRG MED/CURRENT MED MERGE: CPT | Performed by: INTERNAL MEDICINE

## 2019-01-01 PROCEDURE — 96375 TX/PRO/DX INJ NEW DRUG ADDON: CPT

## 2019-01-01 PROCEDURE — 6360000004 HC RX CONTRAST MEDICATION: Performed by: INTERNAL MEDICINE

## 2019-01-01 PROCEDURE — 1090F PRES/ABSN URINE INCON ASSESS: CPT | Performed by: INTERNAL MEDICINE

## 2019-01-01 PROCEDURE — 2500000003 HC RX 250 WO HCPCS: Performed by: INTERNAL MEDICINE

## 2019-01-01 PROCEDURE — 99211 OFF/OP EST MAY X REQ PHY/QHP: CPT | Performed by: INTERNAL MEDICINE

## 2019-01-01 PROCEDURE — 74018 RADEX ABDOMEN 1 VIEW: CPT

## 2019-01-01 PROCEDURE — 86850 RBC ANTIBODY SCREEN: CPT

## 2019-01-01 PROCEDURE — 85730 THROMBOPLASTIN TIME PARTIAL: CPT

## 2019-01-01 PROCEDURE — 99214 OFFICE O/P EST MOD 30 MIN: CPT | Performed by: INTERNAL MEDICINE

## 2019-01-01 PROCEDURE — 0UDB7ZZ EXTRACTION OF ENDOMETRIUM, VIA NATURAL OR ARTIFICIAL OPENING: ICD-10-PCS | Performed by: OBSTETRICS & GYNECOLOGY

## 2019-01-01 PROCEDURE — 6370000000 HC RX 637 (ALT 250 FOR IP)

## 2019-01-01 PROCEDURE — 4A023N7 MEASUREMENT OF CARDIAC SAMPLING AND PRESSURE, LEFT HEART, PERCUTANEOUS APPROACH: ICD-10-PCS | Performed by: INTERNAL MEDICINE

## 2019-01-01 PROCEDURE — G8427 DOCREV CUR MEDS BY ELIG CLIN: HCPCS | Performed by: INTERNAL MEDICINE

## 2019-01-01 PROCEDURE — 2500000003 HC RX 250 WO HCPCS: Performed by: STUDENT IN AN ORGANIZED HEALTH CARE EDUCATION/TRAINING PROGRAM

## 2019-01-01 PROCEDURE — 027135Z DILATION OF CORONARY ARTERY, TWO ARTERIES WITH TWO DRUG-ELUTING INTRALUMINAL DEVICES, PERCUTANEOUS APPROACH: ICD-10-PCS | Performed by: INTERNAL MEDICINE

## 2019-01-01 PROCEDURE — 82272 OCCULT BLD FECES 1-3 TESTS: CPT

## 2019-01-01 PROCEDURE — 93005 ELECTROCARDIOGRAM TRACING: CPT | Performed by: STUDENT IN AN ORGANIZED HEALTH CARE EDUCATION/TRAINING PROGRAM

## 2019-01-01 PROCEDURE — 87186 SC STD MICRODIL/AGAR DIL: CPT

## 2019-01-01 PROCEDURE — 0UJH7ZZ INSPECTION OF VAGINA AND CUL-DE-SAC, VIA NATURAL OR ARTIFICIAL OPENING: ICD-10-PCS | Performed by: OBSTETRICS & GYNECOLOGY

## 2019-01-01 PROCEDURE — 77295 3-D RADIOTHERAPY PLAN: CPT | Performed by: RADIOLOGY

## 2019-01-01 PROCEDURE — 0DB68ZX EXCISION OF STOMACH, VIA NATURAL OR ARTIFICIAL OPENING ENDOSCOPIC, DIAGNOSTIC: ICD-10-PCS | Performed by: INTERNAL MEDICINE

## 2019-01-01 PROCEDURE — 77387 GUIDANCE FOR RADJ TX DLVR: CPT | Performed by: RADIOLOGY

## 2019-01-01 PROCEDURE — 85014 HEMATOCRIT: CPT

## 2019-01-01 PROCEDURE — 86900 BLOOD TYPING SEROLOGIC ABO: CPT

## 2019-01-01 PROCEDURE — 94762 N-INVAS EAR/PLS OXIMTRY CONT: CPT

## 2019-01-01 PROCEDURE — 99285 EMERGENCY DEPT VISIT HI MDM: CPT

## 2019-01-01 PROCEDURE — 2700000000 HC OXYGEN THERAPY PER DAY

## 2019-01-01 PROCEDURE — 2500000003 HC RX 250 WO HCPCS: Performed by: NURSE ANESTHETIST, CERTIFIED REGISTERED

## 2019-01-01 PROCEDURE — G8400 PT W/DXA NO RESULTS DOC: HCPCS | Performed by: INTERNAL MEDICINE

## 2019-01-01 PROCEDURE — 84484 ASSAY OF TROPONIN QUANT: CPT

## 2019-01-01 PROCEDURE — P9603 ONE-WAY ALLOW PRORATED MILES: HCPCS

## 2019-01-01 PROCEDURE — A9579 GAD-BASE MR CONTRAST NOS,1ML: HCPCS | Performed by: OBSTETRICS & GYNECOLOGY

## 2019-01-01 PROCEDURE — 2709999900 IR FLUORO GUIDED NEEDLE PLACEMENT

## 2019-01-01 PROCEDURE — 1111F DSCHRG MED/CURRENT MED MERGE: CPT

## 2019-01-01 PROCEDURE — 84439 ASSAY OF FREE THYROXINE: CPT

## 2019-01-01 PROCEDURE — 99239 HOSP IP/OBS DSCHRG MGMT >30: CPT | Performed by: INTERNAL MEDICINE

## 2019-01-01 PROCEDURE — 6360000002 HC RX W HCPCS

## 2019-01-01 PROCEDURE — 1036F TOBACCO NON-USER: CPT | Performed by: INTERNAL MEDICINE

## 2019-01-01 PROCEDURE — 96374 THER/PROPH/DIAG INJ IV PUSH: CPT

## 2019-01-01 PROCEDURE — 86901 BLOOD TYPING SEROLOGIC RH(D): CPT

## 2019-01-01 PROCEDURE — 6360000002 HC RX W HCPCS: Performed by: ANESTHESIOLOGY

## 2019-01-01 PROCEDURE — 77334 RADIATION TREATMENT AID(S): CPT | Performed by: RADIOLOGY

## 2019-01-01 PROCEDURE — 7100000000 HC PACU RECOVERY - FIRST 15 MIN: Performed by: OBSTETRICS & GYNECOLOGY

## 2019-01-01 PROCEDURE — 37191 INS ENDOVAS VENA CAVA FILTR: CPT

## 2019-01-01 PROCEDURE — 85027 COMPLETE CBC AUTOMATED: CPT

## 2019-01-01 PROCEDURE — 0UDB7ZX EXTRACTION OF ENDOMETRIUM, VIA NATURAL OR ARTIFICIAL OPENING, DIAGNOSTIC: ICD-10-PCS | Performed by: OBSTETRICS & GYNECOLOGY

## 2019-01-01 PROCEDURE — 88313 SPECIAL STAINS GROUP 2: CPT

## 2019-01-01 PROCEDURE — 83735 ASSAY OF MAGNESIUM: CPT

## 2019-01-01 PROCEDURE — 11200 RMVL SKIN TAGS UP TO&INC 15: CPT | Performed by: DERMATOLOGY

## 2019-01-01 PROCEDURE — 71045 X-RAY EXAM CHEST 1 VIEW: CPT

## 2019-01-01 PROCEDURE — 76937 US GUIDE VASCULAR ACCESS: CPT

## 2019-01-01 PROCEDURE — 6360000002 HC RX W HCPCS: Performed by: EMERGENCY MEDICINE

## 2019-01-01 PROCEDURE — 93005 ELECTROCARDIOGRAM TRACING: CPT

## 2019-01-01 PROCEDURE — 74176 CT ABD & PELVIS W/O CONTRAST: CPT

## 2019-01-01 PROCEDURE — 2580000003 HC RX 258

## 2019-01-01 PROCEDURE — B2111ZZ FLUOROSCOPY OF MULTIPLE CORONARY ARTERIES USING LOW OSMOLAR CONTRAST: ICD-10-PCS | Performed by: INTERNAL MEDICINE

## 2019-01-01 PROCEDURE — 3600000002 HC SURGERY LEVEL 2 BASE: Performed by: OBSTETRICS & GYNECOLOGY

## 2019-01-01 PROCEDURE — 77300 RADIATION THERAPY DOSE PLAN: CPT | Performed by: RADIOLOGY

## 2019-01-01 PROCEDURE — 76830 TRANSVAGINAL US NON-OB: CPT

## 2019-01-01 PROCEDURE — 2500000003 HC RX 250 WO HCPCS

## 2019-01-01 PROCEDURE — 86301 IMMUNOASSAY TUMOR CA 19-9: CPT

## 2019-01-01 PROCEDURE — C1760 CLOSURE DEV, VASC: HCPCS

## 2019-01-01 PROCEDURE — 3609012400 HC EGD TRANSORAL BIOPSY SINGLE/MULTIPLE: Performed by: INTERNAL MEDICINE

## 2019-01-01 PROCEDURE — 1123F ACP DISCUSS/DSCN MKR DOCD: CPT | Performed by: INTERNAL MEDICINE

## 2019-01-01 PROCEDURE — 81001 URINALYSIS AUTO W/SCOPE: CPT

## 2019-01-01 PROCEDURE — 99496 TRANSJ CARE MGMT HIGH F2F 7D: CPT | Performed by: INTERNAL MEDICINE

## 2019-01-01 PROCEDURE — 3700000001 HC ADD 15 MINUTES (ANESTHESIA): Performed by: INTERNAL MEDICINE

## 2019-01-01 PROCEDURE — C1769 GUIDE WIRE: HCPCS

## 2019-01-01 PROCEDURE — 6360000004 HC RX CONTRAST MEDICATION

## 2019-01-01 PROCEDURE — C9113 INJ PANTOPRAZOLE SODIUM, VIA: HCPCS | Performed by: STUDENT IN AN ORGANIZED HEALTH CARE EDUCATION/TRAINING PROGRAM

## 2019-01-01 PROCEDURE — 99232 SBSQ HOSP IP/OBS MODERATE 35: CPT | Performed by: FAMILY MEDICINE

## 2019-01-01 PROCEDURE — 87480 CANDIDA DNA DIR PROBE: CPT

## 2019-01-01 PROCEDURE — C9601 PERC DRUG-EL COR STENT BRAN: HCPCS | Performed by: INTERNAL MEDICINE

## 2019-01-01 PROCEDURE — 2580000003 HC RX 258: Performed by: NURSE ANESTHETIST, CERTIFIED REGISTERED

## 2019-01-01 PROCEDURE — 77290 THER RAD SIMULAJ FIELD CPLX: CPT | Performed by: RADIOLOGY

## 2019-01-01 PROCEDURE — 86304 IMMUNOASSAY TUMOR CA 125: CPT

## 2019-01-01 PROCEDURE — 07BH3ZX EXCISION OF RIGHT INGUINAL LYMPHATIC, PERCUTANEOUS APPROACH, DIAGNOSTIC: ICD-10-PCS | Performed by: RADIOLOGY

## 2019-01-01 PROCEDURE — 6370000000 HC RX 637 (ALT 250 FOR IP): Performed by: EMERGENCY MEDICINE

## 2019-01-01 PROCEDURE — G8484 FLU IMMUNIZE NO ADMIN: HCPCS | Performed by: INTERNAL MEDICINE

## 2019-01-01 PROCEDURE — 76775 US EXAM ABDO BACK WALL LIM: CPT

## 2019-01-01 PROCEDURE — 2580000003 HC RX 258: Performed by: OBSTETRICS & GYNECOLOGY

## 2019-01-01 PROCEDURE — 76937 US GUIDE VASCULAR ACCESS: CPT | Performed by: RADIOLOGY

## 2019-01-01 PROCEDURE — 05HB33Z INSERTION OF INFUSION DEVICE INTO RIGHT BASILIC VEIN, PERCUTANEOUS APPROACH: ICD-10-PCS | Performed by: RADIOLOGY

## 2019-01-01 PROCEDURE — C9600 PERC DRUG-EL COR STENT SING: HCPCS | Performed by: INTERNAL MEDICINE

## 2019-01-01 PROCEDURE — 2580000003 HC RX 258: Performed by: FAMILY MEDICINE

## 2019-01-01 PROCEDURE — 99223 1ST HOSP IP/OBS HIGH 75: CPT | Performed by: INTERNAL MEDICINE

## 2019-01-01 PROCEDURE — 7100000001 HC PACU RECOVERY - ADDTL 15 MIN: Performed by: OBSTETRICS & GYNECOLOGY

## 2019-01-01 PROCEDURE — 87491 CHLMYD TRACH DNA AMP PROBE: CPT

## 2019-01-01 PROCEDURE — 71046 X-RAY EXAM CHEST 2 VIEWS: CPT

## 2019-01-01 PROCEDURE — 93306 TTE W/DOPPLER COMPLETE: CPT

## 2019-01-01 PROCEDURE — 43239 EGD BIOPSY SINGLE/MULTIPLE: CPT | Performed by: INTERNAL MEDICINE

## 2019-01-01 PROCEDURE — 85610 PROTHROMBIN TIME: CPT

## 2019-01-01 PROCEDURE — 07DH3ZX EXTRACTION OF RIGHT INGUINAL LYMPHATIC, PERCUTANEOUS APPROACH, DIAGNOSTIC: ICD-10-PCS | Performed by: RADIOLOGY

## 2019-01-01 PROCEDURE — C1751 CATH, INF, PER/CENT/MIDLINE: HCPCS

## 2019-01-01 PROCEDURE — 76942 ECHO GUIDE FOR BIOPSY: CPT

## 2019-01-01 PROCEDURE — 93010 ELECTROCARDIOGRAM REPORT: CPT | Performed by: INTERNAL MEDICINE

## 2019-01-01 PROCEDURE — 99222 1ST HOSP IP/OBS MODERATE 55: CPT | Performed by: INTERNAL MEDICINE

## 2019-01-01 PROCEDURE — 2580000003 HC RX 258: Performed by: PHYSICIAN ASSISTANT

## 2019-01-01 PROCEDURE — 99214 OFFICE O/P EST MOD 30 MIN: CPT | Performed by: NURSE PRACTITIONER

## 2019-01-01 PROCEDURE — 6360000004 HC RX CONTRAST MEDICATION: Performed by: PHYSICIAN ASSISTANT

## 2019-01-01 PROCEDURE — 2709999900 HC NON-CHARGEABLE SUPPLY: Performed by: OBSTETRICS & GYNECOLOGY

## 2019-01-01 PROCEDURE — 84132 ASSAY OF SERUM POTASSIUM: CPT

## 2019-01-01 PROCEDURE — 6360000004 HC RX CONTRAST MEDICATION: Performed by: OBSTETRICS & GYNECOLOGY

## 2019-01-01 PROCEDURE — 87077 CULTURE AEROBIC IDENTIFY: CPT

## 2019-01-01 PROCEDURE — 99495 TRANSJ CARE MGMT MOD F2F 14D: CPT | Performed by: INTERNAL MEDICINE

## 2019-01-01 PROCEDURE — G8417 CALC BMI ABV UP PARAM F/U: HCPCS | Performed by: INTERNAL MEDICINE

## 2019-01-01 PROCEDURE — 93970 EXTREMITY STUDY: CPT

## 2019-01-01 PROCEDURE — 87510 GARDNER VAG DNA DIR PROBE: CPT

## 2019-01-01 PROCEDURE — 56501 DESTROY VULVA LESIONS SIM: CPT | Performed by: OBSTETRICS & GYNECOLOGY

## 2019-01-01 PROCEDURE — 2580000003 HC RX 258: Performed by: EMERGENCY MEDICINE

## 2019-01-01 PROCEDURE — 1123F ACP DISCUSS/DSCN MKR DOCD: CPT | Performed by: NURSE PRACTITIONER

## 2019-01-01 PROCEDURE — 6370000000 HC RX 637 (ALT 250 FOR IP): Performed by: OBSTETRICS & GYNECOLOGY

## 2019-01-01 PROCEDURE — C9113 INJ PANTOPRAZOLE SODIUM, VIA: HCPCS | Performed by: INTERNAL MEDICINE

## 2019-01-01 PROCEDURE — 7100000000 HC PACU RECOVERY - FIRST 15 MIN: Performed by: INTERNAL MEDICINE

## 2019-01-01 PROCEDURE — 86160 COMPLEMENT ANTIGEN: CPT

## 2019-01-01 PROCEDURE — 88184 FLOWCYTOMETRY/ TC 1 MARKER: CPT

## 2019-01-01 PROCEDURE — 99222 1ST HOSP IP/OBS MODERATE 55: CPT | Performed by: FAMILY MEDICINE

## 2019-01-01 PROCEDURE — C1874 STENT, COATED/COV W/DEL SYS: HCPCS

## 2019-01-01 PROCEDURE — 2500000003 HC RX 250 WO HCPCS: Performed by: ANESTHESIOLOGY

## 2019-01-01 PROCEDURE — 2709999900 HC NON-CHARGEABLE SUPPLY: Performed by: INTERNAL MEDICINE

## 2019-01-01 PROCEDURE — 7100000001 HC PACU RECOVERY - ADDTL 15 MIN: Performed by: INTERNAL MEDICINE

## 2019-01-01 PROCEDURE — C1725 CATH, TRANSLUMIN NON-LASER: HCPCS

## 2019-01-01 PROCEDURE — 82550 ASSAY OF CK (CPK): CPT

## 2019-01-01 PROCEDURE — 83540 ASSAY OF IRON: CPT

## 2019-01-01 PROCEDURE — 84300 ASSAY OF URINE SODIUM: CPT

## 2019-01-01 PROCEDURE — 86038 ANTINUCLEAR ANTIBODIES: CPT

## 2019-01-01 PROCEDURE — 99221 1ST HOSP IP/OBS SF/LOW 40: CPT | Performed by: OBSTETRICS & GYNECOLOGY

## 2019-01-01 PROCEDURE — G8417 CALC BMI ABV UP PARAM F/U: HCPCS | Performed by: NURSE PRACTITIONER

## 2019-01-01 PROCEDURE — C1894 INTRO/SHEATH, NON-LASER: HCPCS

## 2019-01-01 PROCEDURE — 90670 PCV13 VACCINE IM: CPT | Performed by: INTERNAL MEDICINE

## 2019-01-01 PROCEDURE — 83605 ASSAY OF LACTIC ACID: CPT

## 2019-01-01 PROCEDURE — 88175 CYTOPATH C/V AUTO FLUID REDO: CPT

## 2019-01-01 PROCEDURE — 6360000002 HC RX W HCPCS: Performed by: RADIOLOGY

## 2019-01-01 PROCEDURE — P9047 ALBUMIN (HUMAN), 25%, 50ML: HCPCS | Performed by: INTERNAL MEDICINE

## 2019-01-01 PROCEDURE — 0DB48ZX EXCISION OF ESOPHAGOGASTRIC JUNCTION, VIA NATURAL OR ARTIFICIAL OPENING ENDOSCOPIC, DIAGNOSTIC: ICD-10-PCS | Performed by: INTERNAL MEDICINE

## 2019-01-01 PROCEDURE — 93458 L HRT ARTERY/VENTRICLE ANGIO: CPT | Performed by: INTERNAL MEDICINE

## 2019-01-01 PROCEDURE — 86235 NUCLEAR ANTIGEN ANTIBODY: CPT

## 2019-01-01 PROCEDURE — C1880 VENA CAVA FILTER: HCPCS

## 2019-01-01 PROCEDURE — C1887 CATHETER, GUIDING: HCPCS

## 2019-01-01 PROCEDURE — 83880 ASSAY OF NATRIURETIC PEPTIDE: CPT

## 2019-01-01 PROCEDURE — 88341 IMHCHEM/IMCYTCHM EA ADD ANTB: CPT

## 2019-01-01 PROCEDURE — 82728 ASSAY OF FERRITIN: CPT

## 2019-01-01 PROCEDURE — 6360000002 HC RX W HCPCS: Performed by: NURSE ANESTHETIST, CERTIFIED REGISTERED

## 2019-01-01 PROCEDURE — 99213 OFFICE O/P EST LOW 20 MIN: CPT | Performed by: RADIOLOGY

## 2019-01-01 PROCEDURE — 0DB98ZX EXCISION OF DUODENUM, VIA NATURAL OR ARTIFICIAL OPENING ENDOSCOPIC, DIAGNOSTIC: ICD-10-PCS | Performed by: INTERNAL MEDICINE

## 2019-01-01 PROCEDURE — 6370000000 HC RX 637 (ALT 250 FOR IP): Performed by: FAMILY MEDICINE

## 2019-01-01 PROCEDURE — 83874 ASSAY OF MYOGLOBIN: CPT

## 2019-01-01 PROCEDURE — 84295 ASSAY OF SERUM SODIUM: CPT

## 2019-01-01 PROCEDURE — 82565 ASSAY OF CREATININE: CPT

## 2019-01-01 PROCEDURE — 87205 SMEAR GRAM STAIN: CPT

## 2019-01-01 PROCEDURE — B2151ZZ FLUOROSCOPY OF LEFT HEART USING LOW OSMOLAR CONTRAST: ICD-10-PCS | Performed by: INTERNAL MEDICINE

## 2019-01-01 PROCEDURE — 97165 OT EVAL LOW COMPLEX 30 MIN: CPT

## 2019-01-01 PROCEDURE — 36410 VNPNXR 3YR/> PHY/QHP DX/THER: CPT | Performed by: RADIOLOGY

## 2019-01-01 PROCEDURE — 86225 DNA ANTIBODY NATIVE: CPT

## 2019-01-01 PROCEDURE — 82435 ASSAY OF BLOOD CHLORIDE: CPT

## 2019-01-01 PROCEDURE — 82330 ASSAY OF CALCIUM: CPT

## 2019-01-01 PROCEDURE — 3017F COLORECTAL CA SCREEN DOC REV: CPT | Performed by: NURSE PRACTITIONER

## 2019-01-01 PROCEDURE — 71260 CT THORAX DX C+: CPT

## 2019-01-01 PROCEDURE — 97162 PT EVAL MOD COMPLEX 30 MIN: CPT

## 2019-01-01 PROCEDURE — 2500000003 HC RX 250 WO HCPCS: Performed by: EMERGENCY MEDICINE

## 2019-01-01 PROCEDURE — 3700000000 HC ANESTHESIA ATTENDED CARE: Performed by: OBSTETRICS & GYNECOLOGY

## 2019-01-01 PROCEDURE — 83550 IRON BINDING TEST: CPT

## 2019-01-01 PROCEDURE — 58120 DILATION AND CURETTAGE: CPT | Performed by: OBSTETRICS & GYNECOLOGY

## 2019-01-01 PROCEDURE — G8427 DOCREV CUR MEDS BY ELIG CLIN: HCPCS | Performed by: NURSE PRACTITIONER

## 2019-01-01 PROCEDURE — 3600000012 HC SURGERY LEVEL 2 ADDTL 15MIN: Performed by: OBSTETRICS & GYNECOLOGY

## 2019-01-01 PROCEDURE — 97166 OT EVAL MOD COMPLEX 45 MIN: CPT

## 2019-01-01 PROCEDURE — 82803 BLOOD GASES ANY COMBINATION: CPT

## 2019-01-01 PROCEDURE — 99213 OFFICE O/P EST LOW 20 MIN: CPT | Performed by: OBSTETRICS & GYNECOLOGY

## 2019-01-01 PROCEDURE — 72197 MRI PELVIS W/O & W/DYE: CPT

## 2019-01-01 PROCEDURE — 87591 N.GONORRHOEAE DNA AMP PROB: CPT

## 2019-01-01 PROCEDURE — 83690 ASSAY OF LIPASE: CPT

## 2019-01-01 PROCEDURE — 85045 AUTOMATED RETICULOCYTE COUNT: CPT

## 2019-01-01 PROCEDURE — 88185 FLOWCYTOMETRY/TC ADD-ON: CPT

## 2019-01-01 PROCEDURE — 1036F TOBACCO NON-USER: CPT | Performed by: NURSE PRACTITIONER

## 2019-01-01 RX ORDER — METOPROLOL TARTRATE 50 MG/1
50 TABLET, FILM COATED ORAL 2 TIMES DAILY
Status: DISCONTINUED | OUTPATIENT
Start: 2019-01-01 | End: 2019-01-01 | Stop reason: HOSPADM

## 2019-01-01 RX ORDER — HYDRALAZINE HYDROCHLORIDE 50 MG/1
50 TABLET, FILM COATED ORAL EVERY 8 HOURS SCHEDULED
Qty: 90 TABLET | Refills: 3 | Status: SHIPPED | OUTPATIENT
Start: 2019-01-01

## 2019-01-01 RX ORDER — GUAIFENESIN 600 MG/1
600 TABLET, EXTENDED RELEASE ORAL 2 TIMES DAILY
COMMUNITY
End: 2019-01-01 | Stop reason: ALTCHOICE

## 2019-01-01 RX ORDER — HYDRALAZINE HYDROCHLORIDE 50 MG/1
50 TABLET, FILM COATED ORAL EVERY 8 HOURS SCHEDULED
Status: DISCONTINUED | OUTPATIENT
Start: 2019-01-01 | End: 2019-01-01 | Stop reason: HOSPADM

## 2019-01-01 RX ORDER — BUMETANIDE 1 MG/1
2 TABLET ORAL DAILY
Status: DISCONTINUED | OUTPATIENT
Start: 2019-01-01 | End: 2019-01-01

## 2019-01-01 RX ORDER — ASPIRIN 81 MG/1
81 TABLET, CHEWABLE ORAL DAILY
Status: DISCONTINUED | OUTPATIENT
Start: 2019-01-01 | End: 2019-01-01 | Stop reason: HOSPADM

## 2019-01-01 RX ORDER — 0.9 % SODIUM CHLORIDE 0.9 %
250 INTRAVENOUS SOLUTION INTRAVENOUS ONCE
Status: DISCONTINUED | OUTPATIENT
Start: 2019-01-01 | End: 2019-01-01 | Stop reason: HOSPADM

## 2019-01-01 RX ORDER — 0.9 % SODIUM CHLORIDE 0.9 %
250 INTRAVENOUS SOLUTION INTRAVENOUS ONCE
Status: COMPLETED | OUTPATIENT
Start: 2019-01-01 | End: 2019-01-01

## 2019-01-01 RX ORDER — POLYETHYLENE GLYCOL 3350 17 G/17G
17 POWDER, FOR SOLUTION ORAL DAILY
Status: DISCONTINUED | OUTPATIENT
Start: 2019-01-01 | End: 2019-01-01 | Stop reason: HOSPADM

## 2019-01-01 RX ORDER — SODIUM CHLORIDE 9 MG/ML
INJECTION, SOLUTION INTRAVENOUS CONTINUOUS PRN
Status: DISCONTINUED | OUTPATIENT
Start: 2019-01-01 | End: 2019-01-01 | Stop reason: SDUPTHER

## 2019-01-01 RX ORDER — NIFEDIPINE 30 MG/1
30 TABLET, FILM COATED, EXTENDED RELEASE ORAL DAILY
Qty: 90 TABLET | Refills: 3 | Status: ON HOLD | OUTPATIENT
Start: 2019-01-01 | End: 2019-01-01 | Stop reason: HOSPADM

## 2019-01-01 RX ORDER — FUROSEMIDE 20 MG/1
20 TABLET ORAL DAILY PRN
Qty: 30 TABLET | Refills: 0 | Status: SHIPPED | OUTPATIENT
Start: 2019-01-01 | End: 2019-01-01 | Stop reason: SDUPTHER

## 2019-01-01 RX ORDER — HYDRALAZINE HYDROCHLORIDE 20 MG/ML
10 INJECTION INTRAMUSCULAR; INTRAVENOUS EVERY 6 HOURS PRN
Status: DISCONTINUED | OUTPATIENT
Start: 2019-01-01 | End: 2019-01-01 | Stop reason: HOSPADM

## 2019-01-01 RX ORDER — DOCUSATE SODIUM 100 MG/1
100 CAPSULE, LIQUID FILLED ORAL 2 TIMES DAILY
Qty: 60 CAPSULE | Refills: 0 | Status: SHIPPED | OUTPATIENT
Start: 2019-01-01 | End: 2019-01-01

## 2019-01-01 RX ORDER — HYDRALAZINE HYDROCHLORIDE 20 MG/ML
5 INJECTION INTRAMUSCULAR; INTRAVENOUS EVERY 10 MIN PRN
Status: DISCONTINUED | OUTPATIENT
Start: 2019-01-01 | End: 2019-01-01 | Stop reason: HOSPADM

## 2019-01-01 RX ORDER — PROPOFOL 10 MG/ML
INJECTION, EMULSION INTRAVENOUS PRN
Status: DISCONTINUED | OUTPATIENT
Start: 2019-01-01 | End: 2019-01-01 | Stop reason: SDUPTHER

## 2019-01-01 RX ORDER — ONDANSETRON 2 MG/ML
4 INJECTION INTRAMUSCULAR; INTRAVENOUS EVERY 6 HOURS PRN
Status: DISCONTINUED | OUTPATIENT
Start: 2019-01-01 | End: 2019-01-01 | Stop reason: HOSPADM

## 2019-01-01 RX ORDER — BISACODYL 10 MG
10 SUPPOSITORY, RECTAL RECTAL DAILY PRN
Status: DISCONTINUED | OUTPATIENT
Start: 2019-01-01 | End: 2019-01-01 | Stop reason: HOSPADM

## 2019-01-01 RX ORDER — AZITHROMYCIN 250 MG/1
500 TABLET, FILM COATED ORAL ONCE
Status: COMPLETED | OUTPATIENT
Start: 2019-01-01 | End: 2019-01-01

## 2019-01-01 RX ORDER — METRONIDAZOLE 500 MG/1
500 TABLET ORAL EVERY 12 HOURS SCHEDULED
Status: DISPENSED | OUTPATIENT
Start: 2019-01-01 | End: 2019-01-01

## 2019-01-01 RX ORDER — POTASSIUM CHLORIDE 20 MEQ/1
40 TABLET, EXTENDED RELEASE ORAL PRN
Status: DISCONTINUED | OUTPATIENT
Start: 2019-01-01 | End: 2019-01-01 | Stop reason: HOSPADM

## 2019-01-01 RX ORDER — SODIUM CHLORIDE, SODIUM LACTATE, POTASSIUM CHLORIDE, CALCIUM CHLORIDE 600; 310; 30; 20 MG/100ML; MG/100ML; MG/100ML; MG/100ML
INJECTION, SOLUTION INTRAVENOUS CONTINUOUS
Status: CANCELLED | OUTPATIENT
Start: 2019-01-01

## 2019-01-01 RX ORDER — METOPROLOL TARTRATE 50 MG/1
50 TABLET, FILM COATED ORAL 2 TIMES DAILY
Qty: 60 TABLET | Refills: 3 | Status: SHIPPED | OUTPATIENT
Start: 2019-01-01 | End: 2019-01-01 | Stop reason: SDUPTHER

## 2019-01-01 RX ORDER — FUROSEMIDE 40 MG/1
40 TABLET ORAL DAILY PRN
Qty: 30 TABLET | Refills: 3 | Status: SHIPPED | OUTPATIENT
Start: 2019-01-01 | End: 2019-01-01

## 2019-01-01 RX ORDER — SODIUM CHLORIDE 9 MG/ML
INJECTION, SOLUTION INTRAVENOUS CONTINUOUS
Status: DISCONTINUED | OUTPATIENT
Start: 2019-01-01 | End: 2019-01-01 | Stop reason: HOSPADM

## 2019-01-01 RX ORDER — BUMETANIDE 1 MG/1
1 TABLET ORAL DAILY
Status: DISCONTINUED | OUTPATIENT
Start: 2019-01-01 | End: 2019-01-01 | Stop reason: HOSPADM

## 2019-01-01 RX ORDER — LABETALOL HYDROCHLORIDE 5 MG/ML
5 INJECTION, SOLUTION INTRAVENOUS EVERY 10 MIN PRN
Status: DISCONTINUED | OUTPATIENT
Start: 2019-01-01 | End: 2019-01-01 | Stop reason: HOSPADM

## 2019-01-01 RX ORDER — FUROSEMIDE 10 MG/ML
20 INJECTION INTRAMUSCULAR; INTRAVENOUS ONCE
Status: COMPLETED | OUTPATIENT
Start: 2019-01-01 | End: 2019-01-01

## 2019-01-01 RX ORDER — ASPIRIN 81 MG/1
81 TABLET, CHEWABLE ORAL DAILY
Qty: 30 TABLET | Refills: 3 | Status: SHIPPED | OUTPATIENT
Start: 2019-01-01

## 2019-01-01 RX ORDER — SPIRONOLACTONE 25 MG/1
TABLET ORAL
Qty: 60 TABLET | Refills: 3 | Status: ON HOLD | OUTPATIENT
Start: 2019-01-01 | End: 2019-01-01 | Stop reason: HOSPADM

## 2019-01-01 RX ORDER — FUROSEMIDE 40 MG/1
40 TABLET ORAL DAILY PRN
Status: DISCONTINUED | OUTPATIENT
Start: 2019-01-01 | End: 2019-01-01 | Stop reason: HOSPADM

## 2019-01-01 RX ORDER — SODIUM CHLORIDE 0.9 % (FLUSH) 0.9 %
10 SYRINGE (ML) INJECTION EVERY 12 HOURS SCHEDULED
Status: DISCONTINUED | OUTPATIENT
Start: 2019-01-01 | End: 2019-01-01

## 2019-01-01 RX ORDER — SODIUM CHLORIDE 0.9 % (FLUSH) 0.9 %
10 SYRINGE (ML) INJECTION EVERY 12 HOURS SCHEDULED
Status: CANCELLED | OUTPATIENT
Start: 2019-01-01

## 2019-01-01 RX ORDER — FENTANYL CITRATE 50 UG/ML
INJECTION, SOLUTION INTRAMUSCULAR; INTRAVENOUS
Status: COMPLETED | OUTPATIENT
Start: 2019-01-01 | End: 2019-01-01

## 2019-01-01 RX ORDER — LEVOTHYROXINE SODIUM 0.05 MG/1
50 TABLET ORAL DAILY
Status: DISCONTINUED | OUTPATIENT
Start: 2019-01-01 | End: 2019-01-01 | Stop reason: HOSPADM

## 2019-01-01 RX ORDER — LEVOTHYROXINE SODIUM 0.03 MG/1
25 TABLET ORAL DAILY
Qty: 90 TABLET | Refills: 1 | Status: ON HOLD | OUTPATIENT
Start: 2019-01-01 | End: 2019-01-01 | Stop reason: HOSPADM

## 2019-01-01 RX ORDER — SODIUM CHLORIDE 9 MG/ML
10 INJECTION INTRAVENOUS ONCE
Status: COMPLETED | OUTPATIENT
Start: 2019-01-01 | End: 2019-01-01

## 2019-01-01 RX ORDER — M-VIT,TX,IRON,MINS/CALC/FOLIC 27MG-0.4MG
1 TABLET ORAL DAILY
Status: DISCONTINUED | OUTPATIENT
Start: 2019-01-01 | End: 2019-01-01 | Stop reason: HOSPADM

## 2019-01-01 RX ORDER — SPIRONOLACTONE 50 MG/1
50 TABLET, FILM COATED ORAL DAILY
Qty: 30 TABLET | Refills: 3 | Status: SHIPPED | OUTPATIENT
Start: 2019-01-01 | End: 2019-01-01

## 2019-01-01 RX ORDER — LANOLIN ALCOHOL/MO/W.PET/CERES
325 CREAM (GRAM) TOPICAL
Qty: 90 TABLET | Refills: 3 | Status: SHIPPED | OUTPATIENT
Start: 2019-01-01 | End: 2019-01-01 | Stop reason: SDUPTHER

## 2019-01-01 RX ORDER — SODIUM CHLORIDE 0.9 % (FLUSH) 0.9 %
10 SYRINGE (ML) INJECTION EVERY 12 HOURS SCHEDULED
Status: DISCONTINUED | OUTPATIENT
Start: 2019-01-01 | End: 2019-01-01 | Stop reason: HOSPADM

## 2019-01-01 RX ORDER — FUROSEMIDE 10 MG/ML
20 INJECTION INTRAMUSCULAR; INTRAVENOUS DAILY
Status: DISCONTINUED | OUTPATIENT
Start: 2019-01-01 | End: 2019-01-01 | Stop reason: HOSPADM

## 2019-01-01 RX ORDER — ASPIRIN 81 MG/1
324 TABLET, CHEWABLE ORAL ONCE
Status: COMPLETED | OUTPATIENT
Start: 2019-01-01 | End: 2019-01-01

## 2019-01-01 RX ORDER — M-VIT,TX,IRON,MINS/CALC/FOLIC 27MG-0.4MG
1 TABLET ORAL DAILY
Qty: 30 TABLET | Refills: 0 | Status: SHIPPED | OUTPATIENT
Start: 2019-01-01 | End: 2019-01-01

## 2019-01-01 RX ORDER — HYDROCODONE BITARTRATE AND ACETAMINOPHEN 5; 325 MG/1; MG/1
1 TABLET ORAL EVERY 6 HOURS PRN
Status: ON HOLD | COMMUNITY
End: 2019-01-01 | Stop reason: SDUPTHER

## 2019-01-01 RX ORDER — LEVOTHYROXINE SODIUM 0.07 MG/1
75 TABLET ORAL DAILY
Status: DISCONTINUED | OUTPATIENT
Start: 2019-01-01 | End: 2019-01-01 | Stop reason: HOSPADM

## 2019-01-01 RX ORDER — LISINOPRIL 10 MG/1
10 TABLET ORAL ONCE
Status: COMPLETED | OUTPATIENT
Start: 2019-01-01 | End: 2019-01-01

## 2019-01-01 RX ORDER — FENTANYL CITRATE 50 UG/ML
25 INJECTION, SOLUTION INTRAMUSCULAR; INTRAVENOUS EVERY 5 MIN PRN
Status: DISCONTINUED | OUTPATIENT
Start: 2019-01-01 | End: 2019-01-01 | Stop reason: HOSPADM

## 2019-01-01 RX ORDER — NIFEDIPINE 30 MG/1
TABLET, FILM COATED, EXTENDED RELEASE ORAL
COMMUNITY
Start: 2019-01-01

## 2019-01-01 RX ORDER — ONDANSETRON 2 MG/ML
4 INJECTION INTRAMUSCULAR; INTRAVENOUS EVERY 8 HOURS PRN
Status: DISCONTINUED | OUTPATIENT
Start: 2019-01-01 | End: 2019-01-01 | Stop reason: HOSPADM

## 2019-01-01 RX ORDER — SODIUM CHLORIDE 0.9 % (FLUSH) 0.9 %
10 SYRINGE (ML) INJECTION PRN
Status: DISCONTINUED | OUTPATIENT
Start: 2019-01-01 | End: 2019-01-01 | Stop reason: HOSPADM

## 2019-01-01 RX ORDER — HYDROCODONE BITARTRATE AND ACETAMINOPHEN 5; 325 MG/1; MG/1
1 TABLET ORAL EVERY 6 HOURS PRN
Qty: 90 TABLET | Refills: 0 | Status: SHIPPED | OUTPATIENT
Start: 2019-01-01 | End: 2019-12-16

## 2019-01-01 RX ORDER — METOCLOPRAMIDE HYDROCHLORIDE 5 MG/ML
10 INJECTION INTRAMUSCULAR; INTRAVENOUS ONCE
Status: COMPLETED | OUTPATIENT
Start: 2019-01-01 | End: 2019-01-01

## 2019-01-01 RX ORDER — AMLODIPINE BESYLATE 5 MG/1
5 TABLET ORAL DAILY
Status: DISCONTINUED | OUTPATIENT
Start: 2019-01-01 | End: 2019-01-01

## 2019-01-01 RX ORDER — ACETAMINOPHEN 325 MG/1
650 TABLET ORAL EVERY 4 HOURS PRN
Status: DISCONTINUED | OUTPATIENT
Start: 2019-01-01 | End: 2019-01-01

## 2019-01-01 RX ORDER — DIPHENHYDRAMINE HCL 25 MG
25 TABLET ORAL DAILY
COMMUNITY
End: 2019-01-01 | Stop reason: ALTCHOICE

## 2019-01-01 RX ORDER — ONDANSETRON 2 MG/ML
4 INJECTION INTRAMUSCULAR; INTRAVENOUS
Status: DISCONTINUED | OUTPATIENT
Start: 2019-01-01 | End: 2019-01-01 | Stop reason: HOSPADM

## 2019-01-01 RX ORDER — SODIUM CHLORIDE 0.9 % (FLUSH) 0.9 %
10 SYRINGE (ML) INJECTION PRN
Status: DISCONTINUED | OUTPATIENT
Start: 2019-01-01 | End: 2019-01-01

## 2019-01-01 RX ORDER — SODIUM CHLORIDE 0.9 % (FLUSH) 0.9 %
30 SYRINGE (ML) INJECTION PRN
Status: DISCONTINUED | OUTPATIENT
Start: 2019-01-01 | End: 2019-01-01 | Stop reason: HOSPADM

## 2019-01-01 RX ORDER — AMLODIPINE BESYLATE 5 MG/1
5 TABLET ORAL ONCE
Status: COMPLETED | OUTPATIENT
Start: 2019-01-01 | End: 2019-01-01

## 2019-01-01 RX ORDER — SPIRONOLACTONE 25 MG/1
50 TABLET ORAL DAILY
Status: DISCONTINUED | OUTPATIENT
Start: 2019-01-01 | End: 2019-01-01 | Stop reason: HOSPADM

## 2019-01-01 RX ORDER — AMLODIPINE BESYLATE 10 MG/1
10 TABLET ORAL DAILY
Status: DISCONTINUED | OUTPATIENT
Start: 2019-01-01 | End: 2019-01-01

## 2019-01-01 RX ORDER — LIDOCAINE HYDROCHLORIDE 10 MG/ML
INJECTION, SOLUTION EPIDURAL; INFILTRATION; INTRACAUDAL; PERINEURAL PRN
Status: DISCONTINUED | OUTPATIENT
Start: 2019-01-01 | End: 2019-01-01 | Stop reason: SDUPTHER

## 2019-01-01 RX ORDER — SPIRONOLACTONE 25 MG/1
25 TABLET ORAL DAILY
Status: DISCONTINUED | OUTPATIENT
Start: 2019-01-01 | End: 2019-01-01

## 2019-01-01 RX ORDER — MIDAZOLAM HYDROCHLORIDE 1 MG/ML
1 INJECTION INTRAMUSCULAR; INTRAVENOUS
Status: CANCELLED | OUTPATIENT
Start: 2019-01-01 | End: 2019-01-01

## 2019-01-01 RX ORDER — SODIUM CHLORIDE 9 MG/ML
INJECTION, SOLUTION INTRAVENOUS CONTINUOUS
Status: DISCONTINUED | OUTPATIENT
Start: 2019-01-01 | End: 2019-01-01

## 2019-01-01 RX ORDER — METOPROLOL TARTRATE 50 MG/1
50 TABLET, FILM COATED ORAL 2 TIMES DAILY
Qty: 180 TABLET | Refills: 3 | Status: SHIPPED | OUTPATIENT
Start: 2019-01-01

## 2019-01-01 RX ORDER — HYDRALAZINE HYDROCHLORIDE 50 MG/1
50 TABLET, FILM COATED ORAL EVERY 8 HOURS SCHEDULED
Qty: 90 TABLET | Refills: 3 | Status: SHIPPED | OUTPATIENT
Start: 2019-01-01 | End: 2019-01-01 | Stop reason: SDUPTHER

## 2019-01-01 RX ORDER — ACETAMINOPHEN 325 MG/1
650 TABLET ORAL EVERY 4 HOURS PRN
Status: DISCONTINUED | OUTPATIENT
Start: 2019-01-01 | End: 2019-01-01 | Stop reason: HOSPADM

## 2019-01-01 RX ORDER — METOPROLOL TARTRATE 50 MG/1
50 TABLET, FILM COATED ORAL 2 TIMES DAILY
Status: DISCONTINUED | OUTPATIENT
Start: 2019-01-01 | End: 2019-01-01

## 2019-01-01 RX ORDER — POTASSIUM CHLORIDE 7.45 MG/ML
10 INJECTION INTRAVENOUS PRN
Status: DISCONTINUED | OUTPATIENT
Start: 2019-01-01 | End: 2019-01-01 | Stop reason: HOSPADM

## 2019-01-01 RX ORDER — LISINOPRIL 10 MG/1
10 TABLET ORAL DAILY
Status: DISCONTINUED | OUTPATIENT
Start: 2019-01-01 | End: 2019-01-01

## 2019-01-01 RX ORDER — NIFEDIPINE 30 MG/1
30 TABLET, FILM COATED, EXTENDED RELEASE ORAL DAILY
Status: DISCONTINUED | OUTPATIENT
Start: 2019-01-01 | End: 2019-01-01

## 2019-01-01 RX ORDER — LANOLIN ALCOHOL/MO/W.PET/CERES
325 CREAM (GRAM) TOPICAL
Status: DISCONTINUED | OUTPATIENT
Start: 2019-01-01 | End: 2019-01-01 | Stop reason: HOSPADM

## 2019-01-01 RX ORDER — LABETALOL 20 MG/4 ML (5 MG/ML) INTRAVENOUS SYRINGE
5 EVERY 10 MIN PRN
Status: DISCONTINUED | OUTPATIENT
Start: 2019-01-01 | End: 2019-01-01 | Stop reason: HOSPADM

## 2019-01-01 RX ORDER — LEVOTHYROXINE SODIUM 0.03 MG/1
25 TABLET ORAL DAILY
Status: DISCONTINUED | OUTPATIENT
Start: 2019-01-01 | End: 2019-01-01

## 2019-01-01 RX ORDER — MIDAZOLAM HYDROCHLORIDE 1 MG/ML
INJECTION INTRAMUSCULAR; INTRAVENOUS
Status: COMPLETED | OUTPATIENT
Start: 2019-01-01 | End: 2019-01-01

## 2019-01-01 RX ORDER — ATORVASTATIN CALCIUM 40 MG/1
40 TABLET, FILM COATED ORAL NIGHTLY
Status: DISCONTINUED | OUTPATIENT
Start: 2019-01-01 | End: 2019-01-01 | Stop reason: HOSPADM

## 2019-01-01 RX ORDER — 0.9 % SODIUM CHLORIDE 0.9 %
500 INTRAVENOUS SOLUTION INTRAVENOUS ONCE
Status: COMPLETED | OUTPATIENT
Start: 2019-01-01 | End: 2019-01-01

## 2019-01-01 RX ORDER — SUCRALFATE 1 G/1
1 TABLET ORAL 4 TIMES DAILY
Qty: 120 TABLET | Refills: 3
Start: 2019-01-01

## 2019-01-01 RX ORDER — LIDOCAINE HYDROCHLORIDE 10 MG/ML
1 INJECTION, SOLUTION EPIDURAL; INFILTRATION; INTRACAUDAL; PERINEURAL
Status: CANCELLED | OUTPATIENT
Start: 2019-01-01 | End: 2019-01-01

## 2019-01-01 RX ORDER — LANOLIN ALCOHOL/MO/W.PET/CERES
325 CREAM (GRAM) TOPICAL 2 TIMES DAILY
Qty: 90 TABLET | Refills: 3 | Status: SHIPPED | OUTPATIENT
Start: 2019-01-01

## 2019-01-01 RX ORDER — LEVOFLOXACIN 500 MG/1
500 TABLET, FILM COATED ORAL DAILY
Qty: 10 TABLET | Refills: 0 | Status: SHIPPED | OUTPATIENT
Start: 2019-01-01 | End: 2019-01-01

## 2019-01-01 RX ORDER — HYDROCODONE BITARTRATE AND ACETAMINOPHEN 5; 325 MG/1; MG/1
1 TABLET ORAL EVERY 4 HOURS PRN
Status: DISCONTINUED | OUTPATIENT
Start: 2019-01-01 | End: 2019-01-01 | Stop reason: HOSPADM

## 2019-01-01 RX ORDER — GUAIFENESIN 600 MG/1
600 TABLET, EXTENDED RELEASE ORAL 2 TIMES DAILY
Status: DISCONTINUED | OUTPATIENT
Start: 2019-01-01 | End: 2019-01-01 | Stop reason: HOSPADM

## 2019-01-01 RX ORDER — ALBUMIN (HUMAN) 12.5 G/50ML
25 SOLUTION INTRAVENOUS ONCE
Status: COMPLETED | OUTPATIENT
Start: 2019-01-01 | End: 2019-01-01

## 2019-01-01 RX ORDER — NIFEDIPINE 30 MG/1
30 TABLET, FILM COATED, EXTENDED RELEASE ORAL DAILY
Qty: 30 TABLET | Refills: 3 | Status: SHIPPED | OUTPATIENT
Start: 2019-01-01 | End: 2019-01-01 | Stop reason: SDUPTHER

## 2019-01-01 RX ORDER — LISINOPRIL 20 MG/1
20 TABLET ORAL DAILY
Status: DISCONTINUED | OUTPATIENT
Start: 2019-01-01 | End: 2019-01-01 | Stop reason: HOSPADM

## 2019-01-01 RX ORDER — 0.9 % SODIUM CHLORIDE 0.9 %
500 INTRAVENOUS SOLUTION INTRAVENOUS
Status: DISCONTINUED | OUTPATIENT
Start: 2019-01-01 | End: 2019-01-01 | Stop reason: HOSPADM

## 2019-01-01 RX ORDER — LEVOFLOXACIN 500 MG/1
500 TABLET, FILM COATED ORAL DAILY
Status: DISCONTINUED | OUTPATIENT
Start: 2019-01-01 | End: 2019-01-01 | Stop reason: HOSPADM

## 2019-01-01 RX ORDER — SPIRONOLACTONE 25 MG/1
25 TABLET ORAL DAILY
Status: DISCONTINUED | OUTPATIENT
Start: 2019-01-01 | End: 2019-01-01 | Stop reason: HOSPADM

## 2019-01-01 RX ORDER — PANTOPRAZOLE SODIUM 40 MG/10ML
40 INJECTION, POWDER, LYOPHILIZED, FOR SOLUTION INTRAVENOUS 2 TIMES DAILY
Status: DISCONTINUED | OUTPATIENT
Start: 2019-01-01 | End: 2019-01-01 | Stop reason: HOSPADM

## 2019-01-01 RX ORDER — HYDRALAZINE HYDROCHLORIDE 25 MG/1
25 TABLET, FILM COATED ORAL EVERY 8 HOURS SCHEDULED
Status: DISCONTINUED | OUTPATIENT
Start: 2019-01-01 | End: 2019-01-01

## 2019-01-01 RX ORDER — LEVOTHYROXINE SODIUM 0.07 MG/1
75 TABLET ORAL DAILY
Qty: 30 TABLET | Refills: 3
Start: 2019-01-01

## 2019-01-01 RX ORDER — CEPHALEXIN 500 MG/1
500 CAPSULE ORAL 2 TIMES DAILY
Qty: 8 CAPSULE | Refills: 0 | Status: SHIPPED | OUTPATIENT
Start: 2019-01-01 | End: 2019-01-01

## 2019-01-01 RX ORDER — FENTANYL CITRATE 50 UG/ML
INJECTION, SOLUTION INTRAMUSCULAR; INTRAVENOUS PRN
Status: DISCONTINUED | OUTPATIENT
Start: 2019-01-01 | End: 2019-01-01 | Stop reason: SDUPTHER

## 2019-01-01 RX ORDER — LOSARTAN POTASSIUM 50 MG/1
50 TABLET ORAL DAILY
Status: DISCONTINUED | OUTPATIENT
Start: 2019-01-01 | End: 2019-01-01

## 2019-01-01 RX ORDER — LIDOCAINE HYDROCHLORIDE AND EPINEPHRINE 10; 10 MG/ML; UG/ML
0.5 INJECTION, SOLUTION INFILTRATION; PERINEURAL ONCE
Status: COMPLETED | OUTPATIENT
Start: 2019-01-01 | End: 2019-01-01

## 2019-01-01 RX ORDER — SODIUM CHLORIDE 0.9 % (FLUSH) 0.9 %
10 SYRINGE (ML) INJECTION PRN
Status: CANCELLED | OUTPATIENT
Start: 2019-01-01

## 2019-01-01 RX ORDER — MAGNESIUM SULFATE 1 G/100ML
1 INJECTION INTRAVENOUS PRN
Status: DISCONTINUED | OUTPATIENT
Start: 2019-01-01 | End: 2019-01-01 | Stop reason: HOSPADM

## 2019-01-01 RX ORDER — LISINOPRIL 20 MG/1
20 TABLET ORAL DAILY
Qty: 30 TABLET | Refills: 3 | Status: SHIPPED | OUTPATIENT
Start: 2019-01-01 | End: 2019-01-01 | Stop reason: ALTCHOICE

## 2019-01-01 RX ORDER — METRONIDAZOLE 500 MG/1
500 TABLET ORAL ONCE
Qty: 1 TABLET | Refills: 0 | Status: SHIPPED | OUTPATIENT
Start: 2019-01-01 | End: 2019-01-01

## 2019-01-01 RX ORDER — ATORVASTATIN CALCIUM 40 MG/1
40 TABLET, FILM COATED ORAL NIGHTLY
Qty: 30 TABLET | Refills: 3 | Status: SHIPPED | OUTPATIENT
Start: 2019-01-01

## 2019-01-01 RX ORDER — OXYCODONE HYDROCHLORIDE AND ACETAMINOPHEN 5; 325 MG/1; MG/1
1 TABLET ORAL EVERY 6 HOURS PRN
Qty: 60 TABLET | Refills: 0 | Status: SHIPPED | OUTPATIENT
Start: 2019-01-01 | End: 2019-01-01

## 2019-01-01 RX ORDER — NIFEDIPINE 30 MG/1
30 TABLET, FILM COATED, EXTENDED RELEASE ORAL DAILY
Status: DISCONTINUED | OUTPATIENT
Start: 2019-01-01 | End: 2019-01-01 | Stop reason: HOSPADM

## 2019-01-01 RX ORDER — SPIRONOLACTONE 25 MG/1
25 TABLET ORAL DAILY
Qty: 30 TABLET | Refills: 3 | Status: SHIPPED | OUTPATIENT
Start: 2019-01-01

## 2019-01-01 RX ORDER — ASPIRIN 81 MG/1
81 TABLET, CHEWABLE ORAL DAILY
Status: DISCONTINUED | OUTPATIENT
Start: 2019-01-01 | End: 2019-01-01

## 2019-01-01 RX ORDER — SODIUM CHLORIDE 9 MG/ML
INJECTION, SOLUTION INTRAVENOUS CONTINUOUS
Status: CANCELLED | OUTPATIENT
Start: 2019-01-01

## 2019-01-01 RX ORDER — PANTOPRAZOLE SODIUM 40 MG/10ML
40 INJECTION, POWDER, LYOPHILIZED, FOR SOLUTION INTRAVENOUS 2 TIMES DAILY
Status: DISCONTINUED | OUTPATIENT
Start: 2019-01-01 | End: 2019-01-01

## 2019-01-01 RX ORDER — LABETALOL HYDROCHLORIDE 5 MG/ML
10 INJECTION, SOLUTION INTRAVENOUS ONCE
Status: COMPLETED | OUTPATIENT
Start: 2019-01-01 | End: 2019-01-01

## 2019-01-01 RX ORDER — SUCRALFATE 1 G/1
1 TABLET ORAL EVERY 6 HOURS SCHEDULED
Status: DISCONTINUED | OUTPATIENT
Start: 2019-01-01 | End: 2019-01-01 | Stop reason: HOSPADM

## 2019-01-01 RX ORDER — LABETALOL HYDROCHLORIDE 5 MG/ML
10 INJECTION, SOLUTION INTRAVENOUS EVERY 4 HOURS PRN
Status: DISCONTINUED | OUTPATIENT
Start: 2019-01-01 | End: 2019-01-01 | Stop reason: HOSPADM

## 2019-01-01 RX ORDER — PROMETHAZINE HYDROCHLORIDE 25 MG/ML
6.25 INJECTION, SOLUTION INTRAMUSCULAR; INTRAVENOUS
Status: DISCONTINUED | OUTPATIENT
Start: 2019-01-01 | End: 2019-01-01 | Stop reason: CLARIF

## 2019-01-01 RX ORDER — MAGNESIUM HYDROXIDE 1200 MG/15ML
LIQUID ORAL CONTINUOUS PRN
Status: COMPLETED | OUTPATIENT
Start: 2019-01-01 | End: 2019-01-01

## 2019-01-01 RX ORDER — HYDRALAZINE HYDROCHLORIDE 50 MG/1
50 TABLET, FILM COATED ORAL EVERY 8 HOURS SCHEDULED
Status: DISCONTINUED | OUTPATIENT
Start: 2019-01-01 | End: 2019-01-01

## 2019-01-01 RX ORDER — FERROUS SULFATE 325(65) MG
325 TABLET ORAL 2 TIMES DAILY WITH MEALS
Status: DISCONTINUED | OUTPATIENT
Start: 2019-01-01 | End: 2019-01-01 | Stop reason: HOSPADM

## 2019-01-01 RX ORDER — OMEPRAZOLE 40 MG/1
40 CAPSULE, DELAYED RELEASE ORAL 2 TIMES DAILY
Qty: 60 CAPSULE | Refills: 3
Start: 2019-01-01

## 2019-01-01 RX ORDER — ONDANSETRON 2 MG/ML
INJECTION INTRAMUSCULAR; INTRAVENOUS PRN
Status: DISCONTINUED | OUTPATIENT
Start: 2019-01-01 | End: 2019-01-01 | Stop reason: SDUPTHER

## 2019-01-01 RX ORDER — SODIUM CHLORIDE 9 MG/ML
10 INJECTION INTRAVENOUS 2 TIMES DAILY
Status: DISCONTINUED | OUTPATIENT
Start: 2019-01-01 | End: 2019-01-01 | Stop reason: HOSPADM

## 2019-01-01 RX ORDER — FENTANYL CITRATE 50 UG/ML
25 INJECTION, SOLUTION INTRAMUSCULAR; INTRAVENOUS
Status: DISCONTINUED | OUTPATIENT
Start: 2019-01-01 | End: 2019-01-01 | Stop reason: HOSPADM

## 2019-01-01 RX ORDER — LEVOTHYROXINE SODIUM 0.05 MG/1
50 TABLET ORAL DAILY
Qty: 30 TABLET | Refills: 3 | Status: ON HOLD | OUTPATIENT
Start: 2019-01-01 | End: 2019-01-01

## 2019-01-01 RX ORDER — BISACODYL 10 MG
10 SUPPOSITORY, RECTAL RECTAL DAILY PRN
COMMUNITY

## 2019-01-01 RX ORDER — OXYCODONE HYDROCHLORIDE AND ACETAMINOPHEN 5; 325 MG/1; MG/1
1 TABLET ORAL EVERY 6 HOURS PRN
Qty: 12 TABLET | Refills: 0 | Status: SHIPPED | OUTPATIENT
Start: 2019-01-01 | End: 2019-01-01 | Stop reason: SDUPTHER

## 2019-01-01 RX ORDER — LOSARTAN POTASSIUM 50 MG/1
50 TABLET ORAL DAILY
Qty: 90 TABLET | Refills: 1 | Status: SHIPPED | OUTPATIENT
Start: 2019-01-01

## 2019-01-01 RX ORDER — DIPHENHYDRAMINE HYDROCHLORIDE 50 MG/ML
12.5 INJECTION INTRAMUSCULAR; INTRAVENOUS
Status: DISCONTINUED | OUTPATIENT
Start: 2019-01-01 | End: 2019-01-01 | Stop reason: HOSPADM

## 2019-01-01 RX ORDER — SODIUM CHLORIDE 9 MG/ML
INJECTION, SOLUTION INTRAVENOUS CONTINUOUS
Status: ACTIVE | OUTPATIENT
Start: 2019-01-01 | End: 2019-01-01

## 2019-01-01 RX ADMIN — IOVERSOL 30 ML: 741 INJECTION INTRA-ARTERIAL; INTRAVENOUS at 17:22

## 2019-01-01 RX ADMIN — SUCRALFATE 1 G: 1 TABLET ORAL at 06:17

## 2019-01-01 RX ADMIN — BUMETANIDE 2 MG: 1 TABLET ORAL at 16:34

## 2019-01-01 RX ADMIN — BUMETANIDE 1 MG: 1 TABLET ORAL at 08:34

## 2019-01-01 RX ADMIN — LEVOTHYROXINE SODIUM 50 MCG: 50 TABLET ORAL at 08:37

## 2019-01-01 RX ADMIN — Medication 10 ML: at 21:10

## 2019-01-01 RX ADMIN — LABETALOL 20 MG/4 ML (5 MG/ML) INTRAVENOUS SYRINGE 10 MG: at 10:43

## 2019-01-01 RX ADMIN — ONDANSETRON 4 MG: 2 INJECTION, SOLUTION INTRAMUSCULAR; INTRAVENOUS at 13:34

## 2019-01-01 RX ADMIN — PANTOPRAZOLE SODIUM 40 MG: 40 INJECTION, POWDER, FOR SOLUTION INTRAVENOUS at 01:59

## 2019-01-01 RX ADMIN — DESMOPRESSIN ACETATE 40 MG: 0.2 TABLET ORAL at 21:48

## 2019-01-01 RX ADMIN — FENTANYL CITRATE 25 MCG: 50 INJECTION INTRAMUSCULAR; INTRAVENOUS at 14:25

## 2019-01-01 RX ADMIN — LEVOTHYROXINE SODIUM 25 MCG: 25 TABLET ORAL at 14:51

## 2019-01-01 RX ADMIN — ACETAMINOPHEN 650 MG: 325 TABLET, FILM COATED ORAL at 00:43

## 2019-01-01 RX ADMIN — DESMOPRESSIN ACETATE 40 MG: 0.2 TABLET ORAL at 20:51

## 2019-01-01 RX ADMIN — SUCRALFATE 1 G: 1 TABLET ORAL at 17:01

## 2019-01-01 RX ADMIN — AZITHROMYCIN 500 MG: 250 TABLET, FILM COATED ORAL at 12:25

## 2019-01-01 RX ADMIN — SODIUM CHLORIDE, PRESERVATIVE FREE 10 ML: 5 INJECTION INTRAVENOUS at 09:21

## 2019-01-01 RX ADMIN — SODIUM CHLORIDE, PRESERVATIVE FREE 10 ML: 5 INJECTION INTRAVENOUS at 22:29

## 2019-01-01 RX ADMIN — ACETAMINOPHEN 650 MG: 325 TABLET, FILM COATED ORAL at 16:08

## 2019-01-01 RX ADMIN — LEVOTHYROXINE SODIUM 75 MCG: 75 TABLET ORAL at 05:31

## 2019-01-01 RX ADMIN — Medication 10 ML: at 09:51

## 2019-01-01 RX ADMIN — NIFEDIPINE 30 MG: 30 TABLET, EXTENDED RELEASE ORAL at 08:55

## 2019-01-01 RX ADMIN — Medication 10 ML: at 08:44

## 2019-01-01 RX ADMIN — SODIUM CHLORIDE: 900 INJECTION, SOLUTION INTRAVENOUS at 15:27

## 2019-01-01 RX ADMIN — BUMETANIDE 1 MG: 1 TABLET ORAL at 16:19

## 2019-01-01 RX ADMIN — TICAGRELOR 90 MG: 90 TABLET ORAL at 14:00

## 2019-01-01 RX ADMIN — ACETAMINOPHEN 650 MG: 325 TABLET ORAL at 21:49

## 2019-01-01 RX ADMIN — SPIRONOLACTONE 25 MG: 25 TABLET ORAL at 09:44

## 2019-01-01 RX ADMIN — DESMOPRESSIN ACETATE 40 MG: 0.2 TABLET ORAL at 21:04

## 2019-01-01 RX ADMIN — FENTANYL CITRATE 25 MCG: 50 INJECTION, SOLUTION INTRAMUSCULAR; INTRAVENOUS at 18:25

## 2019-01-01 RX ADMIN — SODIUM CHLORIDE, PRESERVATIVE FREE 10 ML: 5 INJECTION INTRAVENOUS at 08:26

## 2019-01-01 RX ADMIN — ASPIRIN 81 MG: 81 TABLET, CHEWABLE ORAL at 09:28

## 2019-01-01 RX ADMIN — Medication 10 ML: at 20:17

## 2019-01-01 RX ADMIN — GUAIFENESIN 600 MG: 600 TABLET, EXTENDED RELEASE ORAL at 09:57

## 2019-01-01 RX ADMIN — METOPROLOL TARTRATE 50 MG: 50 TABLET, FILM COATED ORAL at 21:27

## 2019-01-01 RX ADMIN — PANTOPRAZOLE SODIUM 40 MG: 40 INJECTION, POWDER, FOR SOLUTION INTRAVENOUS at 20:57

## 2019-01-01 RX ADMIN — METOPROLOL TARTRATE 50 MG: 50 TABLET, FILM COATED ORAL at 08:30

## 2019-01-01 RX ADMIN — LISINOPRIL 20 MG: 20 TABLET ORAL at 08:39

## 2019-01-01 RX ADMIN — TICAGRELOR 90 MG: 90 TABLET ORAL at 09:50

## 2019-01-01 RX ADMIN — IRON SUCROSE 300 MG: 20 INJECTION, SOLUTION INTRAVENOUS at 09:40

## 2019-01-01 RX ADMIN — MIDAZOLAM HYDROCHLORIDE 0.5 MG: 1 INJECTION, SOLUTION INTRAMUSCULAR; INTRAVENOUS at 16:48

## 2019-01-01 RX ADMIN — LIDOCAINE HYDROCHLORIDE 150 MG: 10 INJECTION, SOLUTION EPIDURAL; INFILTRATION; INTRACAUDAL at 15:34

## 2019-01-01 RX ADMIN — FENTANYL CITRATE 25 MCG: 50 INJECTION, SOLUTION INTRAMUSCULAR; INTRAVENOUS at 00:46

## 2019-01-01 RX ADMIN — SODIUM CHLORIDE: 9 INJECTION, SOLUTION INTRAVENOUS at 19:24

## 2019-01-01 RX ADMIN — METOPROLOL TARTRATE 50 MG: 50 TABLET, FILM COATED ORAL at 09:51

## 2019-01-01 RX ADMIN — TICAGRELOR 90 MG: 90 TABLET ORAL at 21:12

## 2019-01-01 RX ADMIN — POTASSIUM CHLORIDE: 2 INJECTION, SOLUTION, CONCENTRATE INTRAVENOUS at 10:29

## 2019-01-01 RX ADMIN — PROPOFOL 150 MG: 10 INJECTION, EMULSION INTRAVENOUS at 13:12

## 2019-01-01 RX ADMIN — METOPROLOL TARTRATE 50 MG: 50 TABLET, FILM COATED ORAL at 20:33

## 2019-01-01 RX ADMIN — LEVOTHYROXINE SODIUM 75 MCG: 75 TABLET ORAL at 06:18

## 2019-01-01 RX ADMIN — METOPROLOL TARTRATE 50 MG: 50 TABLET, FILM COATED ORAL at 08:37

## 2019-01-01 RX ADMIN — AMLODIPINE BESYLATE 5 MG: 5 TABLET ORAL at 17:35

## 2019-01-01 RX ADMIN — SODIUM CHLORIDE, PRESERVATIVE FREE 10 ML: 5 INJECTION INTRAVENOUS at 09:36

## 2019-01-01 RX ADMIN — TICAGRELOR 90 MG: 90 TABLET ORAL at 08:22

## 2019-01-01 RX ADMIN — METOPROLOL TARTRATE 50 MG: 50 TABLET, FILM COATED ORAL at 20:50

## 2019-01-01 RX ADMIN — SUCRALFATE 1 G: 1 TABLET ORAL at 00:46

## 2019-01-01 RX ADMIN — DESMOPRESSIN ACETATE 40 MG: 0.2 TABLET ORAL at 20:48

## 2019-01-01 RX ADMIN — SPIRONOLACTONE 25 MG: 25 TABLET ORAL at 11:28

## 2019-01-01 RX ADMIN — FERROUS SULFATE TAB 325 MG (65 MG ELEMENTAL FE) 325 MG: 325 (65 FE) TAB at 17:01

## 2019-01-01 RX ADMIN — DESMOPRESSIN ACETATE 40 MG: 0.2 TABLET ORAL at 22:03

## 2019-01-01 RX ADMIN — SODIUM CHLORIDE, PRESERVATIVE FREE 10 ML: 5 INJECTION INTRAVENOUS at 08:28

## 2019-01-01 RX ADMIN — SPIRONOLACTONE 25 MG: 25 TABLET, FILM COATED ORAL at 07:40

## 2019-01-01 RX ADMIN — ASPIRIN 81 MG: 81 TABLET ORAL at 08:37

## 2019-01-01 RX ADMIN — ASPIRIN 324 MG: 81 TABLET ORAL at 09:59

## 2019-01-01 RX ADMIN — BUMETANIDE 1 MG: 1 TABLET ORAL at 08:38

## 2019-01-01 RX ADMIN — METOPROLOL TARTRATE 50 MG: 50 TABLET, FILM COATED ORAL at 09:20

## 2019-01-01 RX ADMIN — ASPIRIN 81 MG: 81 TABLET, CHEWABLE ORAL at 08:34

## 2019-01-01 RX ADMIN — BUMETANIDE 1 MG: 1 TABLET ORAL at 08:37

## 2019-01-01 RX ADMIN — Medication 10 ML: at 20:59

## 2019-01-01 RX ADMIN — SODIUM CHLORIDE: 9 INJECTION, SOLUTION INTRAVENOUS at 09:36

## 2019-01-01 RX ADMIN — ENOXAPARIN SODIUM 30 MG: 30 INJECTION SUBCUTANEOUS at 20:13

## 2019-01-01 RX ADMIN — FUROSEMIDE 20 MG: 10 INJECTION, SOLUTION INTRAMUSCULAR; INTRAVENOUS at 08:41

## 2019-01-01 RX ADMIN — Medication 10 ML: at 20:26

## 2019-01-01 RX ADMIN — PANTOPRAZOLE SODIUM 40 MG: 40 INJECTION, POWDER, FOR SOLUTION INTRAVENOUS at 21:07

## 2019-01-01 RX ADMIN — SODIUM CHLORIDE: 9 INJECTION, SOLUTION INTRAVENOUS at 17:32

## 2019-01-01 RX ADMIN — Medication 10 ML: at 08:43

## 2019-01-01 RX ADMIN — SODIUM CHLORIDE 250 ML: 9 INJECTION, SOLUTION INTRAVENOUS at 15:55

## 2019-01-01 RX ADMIN — LOSARTAN POTASSIUM 50 MG: 50 TABLET, FILM COATED ORAL at 09:00

## 2019-01-01 RX ADMIN — METOPROLOL TARTRATE 50 MG: 50 TABLET, FILM COATED ORAL at 21:12

## 2019-01-01 RX ADMIN — LEVOTHYROXINE SODIUM 75 MCG: 75 TABLET ORAL at 05:30

## 2019-01-01 RX ADMIN — FUROSEMIDE 20 MG: 10 INJECTION, SOLUTION INTRAMUSCULAR; INTRAVENOUS at 13:26

## 2019-01-01 RX ADMIN — LEVOFLOXACIN 500 MG: 500 TABLET, FILM COATED ORAL at 08:39

## 2019-01-01 RX ADMIN — BUMETANIDE 1 MG: 1 TABLET ORAL at 08:22

## 2019-01-01 RX ADMIN — SPIRONOLACTONE 25 MG: 25 TABLET ORAL at 09:08

## 2019-01-01 RX ADMIN — HYDRALAZINE HYDROCHLORIDE 10 MG: 20 INJECTION INTRAMUSCULAR; INTRAVENOUS at 17:31

## 2019-01-01 RX ADMIN — LISINOPRIL 10 MG: 10 TABLET ORAL at 17:34

## 2019-01-01 RX ADMIN — SPIRONOLACTONE 25 MG: 25 TABLET ORAL at 08:30

## 2019-01-01 RX ADMIN — LEVOTHYROXINE SODIUM 50 MCG: 50 TABLET ORAL at 17:33

## 2019-01-01 RX ADMIN — METOPROLOL TARTRATE 50 MG: 50 TABLET, FILM COATED ORAL at 21:04

## 2019-01-01 RX ADMIN — SODIUM CHLORIDE, PRESERVATIVE FREE 10 ML: 5 INJECTION INTRAVENOUS at 08:38

## 2019-01-01 RX ADMIN — FERROUS SULFATE TAB EC 325 MG (65 MG FE EQUIVALENT) 325 MG: 325 (65 FE) TABLET DELAYED RESPONSE at 09:20

## 2019-01-01 RX ADMIN — SODIUM CHLORIDE 10 ML: 9 INJECTION, SOLUTION INTRAMUSCULAR; INTRAVENOUS; SUBCUTANEOUS at 01:59

## 2019-01-01 RX ADMIN — IOHEXOL 50 ML: 240 INJECTION, SOLUTION INTRATHECAL; INTRAVASCULAR; INTRAVENOUS; ORAL at 15:30

## 2019-01-01 RX ADMIN — METOPROLOL TARTRATE 50 MG: 50 TABLET, FILM COATED ORAL at 08:55

## 2019-01-01 RX ADMIN — FUROSEMIDE 20 MG: 10 INJECTION, SOLUTION INTRAMUSCULAR; INTRAVENOUS at 21:55

## 2019-01-01 RX ADMIN — ENOXAPARIN SODIUM 40 MG: 40 INJECTION SUBCUTANEOUS at 09:51

## 2019-01-01 RX ADMIN — METRONIDAZOLE 500 MG: 500 TABLET ORAL at 08:34

## 2019-01-01 RX ADMIN — Medication 10 ML: at 21:47

## 2019-01-01 RX ADMIN — NIFEDIPINE 30 MG: 30 TABLET, EXTENDED RELEASE ORAL at 08:41

## 2019-01-01 RX ADMIN — LEVOTHYROXINE SODIUM 50 MCG: 50 TABLET ORAL at 08:27

## 2019-01-01 RX ADMIN — SODIUM CHLORIDE: 9 INJECTION, SOLUTION INTRAVENOUS at 18:44

## 2019-01-01 RX ADMIN — HYDRALAZINE HYDROCHLORIDE 10 MG: 20 INJECTION INTRAMUSCULAR; INTRAVENOUS at 20:13

## 2019-01-01 RX ADMIN — METRONIDAZOLE 500 MG: 500 TABLET ORAL at 08:30

## 2019-01-01 RX ADMIN — METOPROLOL TARTRATE 50 MG: 50 TABLET, FILM COATED ORAL at 19:59

## 2019-01-01 RX ADMIN — SODIUM CHLORIDE, PRESERVATIVE FREE 10 ML: 5 INJECTION INTRAVENOUS at 11:29

## 2019-01-01 RX ADMIN — CEFTRIAXONE SODIUM 1 G: 1 INJECTION, POWDER, FOR SOLUTION INTRAMUSCULAR; INTRAVENOUS at 08:57

## 2019-01-01 RX ADMIN — SODIUM CHLORIDE, PRESERVATIVE FREE 10 ML: 5 INJECTION INTRAVENOUS at 09:46

## 2019-01-01 RX ADMIN — PANTOPRAZOLE SODIUM 40 MG: 40 INJECTION, POWDER, FOR SOLUTION INTRAVENOUS at 09:51

## 2019-01-01 RX ADMIN — MULTIPLE VITAMINS W/ MINERALS TAB 1 TABLET: TAB at 07:45

## 2019-01-01 RX ADMIN — DESMOPRESSIN ACETATE 40 MG: 0.2 TABLET ORAL at 22:26

## 2019-01-01 RX ADMIN — TICAGRELOR 90 MG: 90 TABLET ORAL at 21:30

## 2019-01-01 RX ADMIN — BUMETANIDE 1 MG: 1 TABLET ORAL at 08:29

## 2019-01-01 RX ADMIN — METRONIDAZOLE 500 MG: 500 TABLET ORAL at 10:30

## 2019-01-01 RX ADMIN — SODIUM CHLORIDE, PRESERVATIVE FREE 10 ML: 5 INJECTION INTRAVENOUS at 21:49

## 2019-01-01 RX ADMIN — ASPIRIN 81 MG: 81 TABLET ORAL at 08:22

## 2019-01-01 RX ADMIN — SODIUM CHLORIDE 250 ML: 0.9 INJECTION, SOLUTION INTRAVENOUS at 15:30

## 2019-01-01 RX ADMIN — METOCLOPRAMIDE 10 MG: 5 INJECTION, SOLUTION INTRAMUSCULAR; INTRAVENOUS at 21:52

## 2019-01-01 RX ADMIN — ONDANSETRON 4 MG: 2 INJECTION INTRAMUSCULAR; INTRAVENOUS at 18:10

## 2019-01-01 RX ADMIN — IRON SUCROSE 300 MG: 20 INJECTION, SOLUTION INTRAVENOUS at 14:51

## 2019-01-01 RX ADMIN — DESMOPRESSIN ACETATE 40 MG: 0.2 TABLET ORAL at 20:33

## 2019-01-01 RX ADMIN — HYDRALAZINE HYDROCHLORIDE 25 MG: 25 TABLET, FILM COATED ORAL at 15:09

## 2019-01-01 RX ADMIN — AMLODIPINE BESYLATE 5 MG: 5 TABLET ORAL at 15:25

## 2019-01-01 RX ADMIN — MULTIPLE VITAMINS W/ MINERALS TAB 1 TABLET: TAB at 08:37

## 2019-01-01 RX ADMIN — LIDOCAINE HYDROCHLORIDE 50 MG: 10 INJECTION, SOLUTION EPIDURAL; INFILTRATION; INTRACAUDAL; PERINEURAL at 13:12

## 2019-01-01 RX ADMIN — HYDROCODONE BITARTRATE AND ACETAMINOPHEN 1 TABLET: 5; 325 TABLET ORAL at 14:28

## 2019-01-01 RX ADMIN — Medication 10 ML: at 20:39

## 2019-01-01 RX ADMIN — GUAIFENESIN 600 MG: 600 TABLET, EXTENDED RELEASE ORAL at 20:16

## 2019-01-01 RX ADMIN — SPIRONOLACTONE 25 MG: 25 TABLET ORAL at 08:37

## 2019-01-01 RX ADMIN — SUCRALFATE 1 G: 1 TABLET ORAL at 06:18

## 2019-01-01 RX ADMIN — CEFTRIAXONE SODIUM 1 G: 1 INJECTION, POWDER, FOR SOLUTION INTRAMUSCULAR; INTRAVENOUS at 09:41

## 2019-01-01 RX ADMIN — FERROUS SULFATE TAB 325 MG (65 MG ELEMENTAL FE) 325 MG: 325 (65 FE) TAB at 09:01

## 2019-01-01 RX ADMIN — ATORVASTATIN CALCIUM 40 MG: 40 TABLET, FILM COATED ORAL at 21:07

## 2019-01-01 RX ADMIN — PANTOPRAZOLE SODIUM 40 MG: 40 INJECTION, POWDER, FOR SOLUTION INTRAVENOUS at 21:14

## 2019-01-01 RX ADMIN — Medication 10 ML: at 07:38

## 2019-01-01 RX ADMIN — ASPIRIN 81 MG: 81 TABLET, CHEWABLE ORAL at 10:31

## 2019-01-01 RX ADMIN — Medication 10 ML: at 20:42

## 2019-01-01 RX ADMIN — Medication 10 ML: at 09:42

## 2019-01-01 RX ADMIN — Medication 10 ML: at 20:48

## 2019-01-01 RX ADMIN — ENOXAPARIN SODIUM 40 MG: 40 INJECTION SUBCUTANEOUS at 09:41

## 2019-01-01 RX ADMIN — METOPROLOL TARTRATE 50 MG: 50 TABLET, FILM COATED ORAL at 10:31

## 2019-01-01 RX ADMIN — FERROUS SULFATE TAB EC 325 MG (65 MG FE EQUIVALENT) 325 MG: 325 (65 FE) TABLET DELAYED RESPONSE at 08:38

## 2019-01-01 RX ADMIN — SODIUM CHLORIDE: 9 INJECTION, SOLUTION INTRAVENOUS at 13:57

## 2019-01-01 RX ADMIN — LEVOTHYROXINE SODIUM 50 MCG: 50 TABLET ORAL at 11:28

## 2019-01-01 RX ADMIN — Medication 10 ML: at 09:47

## 2019-01-01 RX ADMIN — HYDRALAZINE HYDROCHLORIDE 50 MG: 50 TABLET, FILM COATED ORAL at 13:26

## 2019-01-01 RX ADMIN — GUAIFENESIN 600 MG: 600 TABLET, EXTENDED RELEASE ORAL at 08:39

## 2019-01-01 RX ADMIN — Medication 10 ML: at 20:34

## 2019-01-01 RX ADMIN — POLYETHYLENE GLYCOL 3350 17 G: 17 POWDER, FOR SOLUTION ORAL at 08:38

## 2019-01-01 RX ADMIN — TICAGRELOR 90 MG: 90 TABLET ORAL at 08:38

## 2019-01-01 RX ADMIN — LIDOCAINE HYDROCHLORIDE AND EPINEPHRINE 0.5 ML: 10; 10 INJECTION, SOLUTION INFILTRATION; PERINEURAL at 11:29

## 2019-01-01 RX ADMIN — Medication 10 ML: at 07:40

## 2019-01-01 RX ADMIN — FERROUS SULFATE TAB EC 325 MG (65 MG FE EQUIVALENT) 325 MG: 325 (65 FE) TABLET DELAYED RESPONSE at 08:37

## 2019-01-01 RX ADMIN — FERROUS SULFATE TAB 325 MG (65 MG ELEMENTAL FE) 325 MG: 325 (65 FE) TAB at 18:36

## 2019-01-01 RX ADMIN — METRONIDAZOLE 500 MG: 500 TABLET ORAL at 09:45

## 2019-01-01 RX ADMIN — ONDANSETRON 4 MG: 2 INJECTION INTRAMUSCULAR; INTRAVENOUS at 13:34

## 2019-01-01 RX ADMIN — MULTIPLE VITAMINS W/ MINERALS TAB 1 TABLET: TAB at 08:22

## 2019-01-01 RX ADMIN — SODIUM CHLORIDE, PRESERVATIVE FREE 10 ML: 5 INJECTION INTRAVENOUS at 22:10

## 2019-01-01 RX ADMIN — ASPIRIN 81 MG: 81 TABLET ORAL at 11:29

## 2019-01-01 RX ADMIN — SPIRONOLACTONE 50 MG: 25 TABLET, FILM COATED ORAL at 08:40

## 2019-01-01 RX ADMIN — SODIUM CHLORIDE, PRESERVATIVE FREE 10 ML: 5 INJECTION INTRAVENOUS at 20:03

## 2019-01-01 RX ADMIN — GADOTERIDOL 20 ML: 279.3 INJECTION, SOLUTION INTRAVENOUS at 08:52

## 2019-01-01 RX ADMIN — FERROUS SULFATE TAB 325 MG (65 MG ELEMENTAL FE) 325 MG: 325 (65 FE) TAB at 08:55

## 2019-01-01 RX ADMIN — LEVOTHYROXINE SODIUM 50 MCG: 50 TABLET ORAL at 08:22

## 2019-01-01 RX ADMIN — DESMOPRESSIN ACETATE 40 MG: 0.2 TABLET ORAL at 21:16

## 2019-01-01 RX ADMIN — PANTOPRAZOLE SODIUM 40 MG: 40 INJECTION, POWDER, FOR SOLUTION INTRAVENOUS at 07:38

## 2019-01-01 RX ADMIN — METOPROLOL TARTRATE 50 MG: 50 TABLET, FILM COATED ORAL at 21:07

## 2019-01-01 RX ADMIN — CEFTRIAXONE SODIUM 1 G: 1 INJECTION, POWDER, FOR SOLUTION INTRAMUSCULAR; INTRAVENOUS at 10:29

## 2019-01-01 RX ADMIN — HYDROCODONE BITARTRATE AND ACETAMINOPHEN 1 TABLET: 5; 325 TABLET ORAL at 03:55

## 2019-01-01 RX ADMIN — SPIRONOLACTONE 25 MG: 25 TABLET ORAL at 08:22

## 2019-01-01 RX ADMIN — SUCRALFATE 1 G: 1 TABLET ORAL at 11:52

## 2019-01-01 RX ADMIN — TICAGRELOR 90 MG: 90 TABLET ORAL at 21:49

## 2019-01-01 RX ADMIN — Medication 10 ML: at 22:07

## 2019-01-01 RX ADMIN — ENOXAPARIN SODIUM 30 MG: 30 INJECTION SUBCUTANEOUS at 08:42

## 2019-01-01 RX ADMIN — TICAGRELOR 90 MG: 90 TABLET ORAL at 08:26

## 2019-01-01 RX ADMIN — LEVOFLOXACIN 500 MG: 500 TABLET, FILM COATED ORAL at 13:26

## 2019-01-01 RX ADMIN — PANTOPRAZOLE SODIUM 40 MG: 40 INJECTION, POWDER, FOR SOLUTION INTRAVENOUS at 20:42

## 2019-01-01 RX ADMIN — DESMOPRESSIN ACETATE 40 MG: 0.2 TABLET ORAL at 20:25

## 2019-01-01 RX ADMIN — FAMOTIDINE 20 MG: 10 INJECTION, SOLUTION INTRAVENOUS at 21:18

## 2019-01-01 RX ADMIN — METRONIDAZOLE 500 MG: 500 TABLET ORAL at 21:12

## 2019-01-01 RX ADMIN — SODIUM CHLORIDE, PRESERVATIVE FREE 10 ML: 5 INJECTION INTRAVENOUS at 20:51

## 2019-01-01 RX ADMIN — METRONIDAZOLE 500 MG: 500 TABLET ORAL at 17:33

## 2019-01-01 RX ADMIN — SUCRALFATE 1 G: 1 TABLET ORAL at 11:02

## 2019-01-01 RX ADMIN — BUMETANIDE 1 MG: 1 TABLET ORAL at 17:32

## 2019-01-01 RX ADMIN — Medication 10 ML: at 20:46

## 2019-01-01 RX ADMIN — Medication 10 ML: at 20:13

## 2019-01-01 RX ADMIN — ASPIRIN 81 MG: 81 TABLET, CHEWABLE ORAL at 09:51

## 2019-01-01 RX ADMIN — METOPROLOL TARTRATE 50 MG: 50 TABLET, FILM COATED ORAL at 08:38

## 2019-01-01 RX ADMIN — CEFTRIAXONE SODIUM 1 G: 1 INJECTION, POWDER, FOR SOLUTION INTRAMUSCULAR; INTRAVENOUS at 09:20

## 2019-01-01 RX ADMIN — METOPROLOL TARTRATE 50 MG: 50 TABLET, FILM COATED ORAL at 21:49

## 2019-01-01 RX ADMIN — LOSARTAN POTASSIUM 50 MG: 50 TABLET, FILM COATED ORAL at 08:55

## 2019-01-01 RX ADMIN — DESMOPRESSIN ACETATE 40 MG: 0.2 TABLET ORAL at 21:27

## 2019-01-01 RX ADMIN — METOPROLOL TARTRATE 50 MG: 50 TABLET, FILM COATED ORAL at 20:42

## 2019-01-01 RX ADMIN — LEVOTHYROXINE SODIUM 75 MCG: 75 TABLET ORAL at 06:16

## 2019-01-01 RX ADMIN — METOPROLOL TARTRATE 50 MG: 50 TABLET, FILM COATED ORAL at 09:01

## 2019-01-01 RX ADMIN — ENOXAPARIN SODIUM 40 MG: 40 INJECTION SUBCUTANEOUS at 14:52

## 2019-01-01 RX ADMIN — METOPROLOL TARTRATE 50 MG: 50 TABLET, FILM COATED ORAL at 09:45

## 2019-01-01 RX ADMIN — Medication 10 ML: at 21:07

## 2019-01-01 RX ADMIN — SUCRALFATE 1 G: 1 TABLET ORAL at 11:03

## 2019-01-01 RX ADMIN — MULTIPLE VITAMINS W/ MINERALS TAB 1 TABLET: TAB at 08:54

## 2019-01-01 RX ADMIN — BUMETANIDE 1 MG: 1 TABLET ORAL at 10:31

## 2019-01-01 RX ADMIN — FUROSEMIDE 20 MG: 10 INJECTION, SOLUTION INTRAMUSCULAR; INTRAVENOUS at 20:39

## 2019-01-01 RX ADMIN — ALBUMIN (HUMAN) 25 G: 0.25 INJECTION, SOLUTION INTRAVENOUS at 19:24

## 2019-01-01 RX ADMIN — ONDANSETRON 4 MG: 2 INJECTION INTRAMUSCULAR; INTRAVENOUS at 14:05

## 2019-01-01 RX ADMIN — LABETALOL 20 MG/4 ML (5 MG/ML) INTRAVENOUS SYRINGE 10 MG: at 12:23

## 2019-01-01 RX ADMIN — LEVOTHYROXINE SODIUM 50 MCG: 50 TABLET ORAL at 08:29

## 2019-01-01 RX ADMIN — CEFTRIAXONE SODIUM 1 G: 1 INJECTION, POWDER, FOR SOLUTION INTRAMUSCULAR; INTRAVENOUS at 11:00

## 2019-01-01 RX ADMIN — TICAGRELOR 90 MG: 90 TABLET ORAL at 20:33

## 2019-01-01 RX ADMIN — DESMOPRESSIN ACETATE 40 MG: 0.2 TABLET ORAL at 21:12

## 2019-01-01 RX ADMIN — LEVOTHYROXINE SODIUM 50 MCG: 50 TABLET ORAL at 09:51

## 2019-01-01 RX ADMIN — ASPIRIN 81 MG: 81 TABLET ORAL at 09:20

## 2019-01-01 RX ADMIN — SUCRALFATE 1 G: 1 TABLET ORAL at 23:27

## 2019-01-01 RX ADMIN — ENOXAPARIN SODIUM 30 MG: 30 INJECTION SUBCUTANEOUS at 20:17

## 2019-01-01 RX ADMIN — POLYETHYLENE GLYCOL 3350 17 G: 17 POWDER, FOR SOLUTION ORAL at 11:28

## 2019-01-01 RX ADMIN — HYDRALAZINE HYDROCHLORIDE 50 MG: 50 TABLET, FILM COATED ORAL at 05:33

## 2019-01-01 RX ADMIN — PANTOPRAZOLE SODIUM 40 MG: 40 INJECTION, POWDER, FOR SOLUTION INTRAVENOUS at 09:01

## 2019-01-01 RX ADMIN — BUMETANIDE 1 MG: 1 TABLET ORAL at 09:45

## 2019-01-01 RX ADMIN — HYDRALAZINE HYDROCHLORIDE 50 MG: 50 TABLET, FILM COATED ORAL at 06:31

## 2019-01-01 RX ADMIN — SPIRONOLACTONE 25 MG: 25 TABLET ORAL at 10:30

## 2019-01-01 RX ADMIN — HYDRALAZINE HYDROCHLORIDE 50 MG: 50 TABLET, FILM COATED ORAL at 22:25

## 2019-01-01 RX ADMIN — METOPROLOL TARTRATE 50 MG: 50 TABLET, FILM COATED ORAL at 09:27

## 2019-01-01 RX ADMIN — DESMOPRESSIN ACETATE 40 MG: 0.2 TABLET ORAL at 20:44

## 2019-01-01 RX ADMIN — METRONIDAZOLE 500 MG: 500 TABLET ORAL at 21:27

## 2019-01-01 RX ADMIN — POLYETHYLENE GLYCOL 3350 17 G: 17 POWDER, FOR SOLUTION ORAL at 08:28

## 2019-01-01 RX ADMIN — METOPROLOL TARTRATE 50 MG: 50 TABLET, FILM COATED ORAL at 17:33

## 2019-01-01 RX ADMIN — SODIUM CHLORIDE 80 MG: 9 INJECTION, SOLUTION INTRAVENOUS at 22:34

## 2019-01-01 RX ADMIN — TICAGRELOR 90 MG: 90 TABLET ORAL at 21:25

## 2019-01-01 RX ADMIN — HYDRALAZINE HYDROCHLORIDE 10 MG: 20 INJECTION INTRAMUSCULAR; INTRAVENOUS at 09:46

## 2019-01-01 RX ADMIN — METOPROLOL TARTRATE 50 MG: 50 TABLET, FILM COATED ORAL at 08:27

## 2019-01-01 RX ADMIN — Medication 5 MG: at 15:23

## 2019-01-01 RX ADMIN — SPIRONOLACTONE 25 MG: 25 TABLET ORAL at 09:21

## 2019-01-01 RX ADMIN — FENTANYL CITRATE 25 MCG: 50 INJECTION INTRAMUSCULAR; INTRAVENOUS at 14:37

## 2019-01-01 RX ADMIN — METOPROLOL TARTRATE 50 MG: 50 TABLET, FILM COATED ORAL at 11:28

## 2019-01-01 RX ADMIN — METOPROLOL TARTRATE 50 MG: 50 TABLET, FILM COATED ORAL at 08:34

## 2019-01-01 RX ADMIN — PROPOFOL 20 MG: 10 INJECTION, EMULSION INTRAVENOUS at 15:35

## 2019-01-01 RX ADMIN — SODIUM CHLORIDE: 9 INJECTION, SOLUTION INTRAVENOUS at 08:00

## 2019-01-01 RX ADMIN — Medication 10 ML: at 06:24

## 2019-01-01 RX ADMIN — Medication 10 ML: at 09:01

## 2019-01-01 RX ADMIN — HYDRALAZINE HYDROCHLORIDE 50 MG: 50 TABLET, FILM COATED ORAL at 07:40

## 2019-01-01 RX ADMIN — DESMOPRESSIN ACETATE 40 MG: 0.2 TABLET ORAL at 20:28

## 2019-01-01 RX ADMIN — ASPIRIN 81 MG: 81 TABLET ORAL at 08:38

## 2019-01-01 RX ADMIN — LISINOPRIL 10 MG: 10 TABLET ORAL at 09:57

## 2019-01-01 RX ADMIN — PROPOFOL 20 MG: 10 INJECTION, EMULSION INTRAVENOUS at 15:39

## 2019-01-01 RX ADMIN — HYDROCODONE BITARTRATE AND ACETAMINOPHEN 1 TABLET: 5; 325 TABLET ORAL at 10:33

## 2019-01-01 RX ADMIN — DESMOPRESSIN ACETATE 40 MG: 0.2 TABLET ORAL at 19:59

## 2019-01-01 RX ADMIN — METOPROLOL TARTRATE 50 MG: 50 TABLET, FILM COATED ORAL at 21:16

## 2019-01-01 RX ADMIN — HYDRALAZINE HYDROCHLORIDE 10 MG: 20 INJECTION INTRAMUSCULAR; INTRAVENOUS at 00:36

## 2019-01-01 RX ADMIN — LEVOTHYROXINE SODIUM 75 MCG: 75 TABLET ORAL at 06:23

## 2019-01-01 RX ADMIN — TICAGRELOR 90 MG: 90 TABLET ORAL at 20:44

## 2019-01-01 RX ADMIN — SODIUM CHLORIDE: 9 INJECTION, SOLUTION INTRAVENOUS at 02:00

## 2019-01-01 RX ADMIN — TICAGRELOR 90 MG: 90 TABLET ORAL at 11:28

## 2019-01-01 RX ADMIN — PROPOFOL 30 MG: 10 INJECTION, EMULSION INTRAVENOUS at 15:34

## 2019-01-01 RX ADMIN — IOVERSOL 75 ML: 741 INJECTION INTRA-ARTERIAL; INTRAVENOUS at 19:55

## 2019-01-01 RX ADMIN — METOPROLOL TARTRATE 25 MG: 25 TABLET ORAL at 20:13

## 2019-01-01 RX ADMIN — FENTANYL CITRATE 25 MCG: 50 INJECTION, SOLUTION INTRAMUSCULAR; INTRAVENOUS at 20:58

## 2019-01-01 RX ADMIN — SODIUM CHLORIDE 500 ML: 9 INJECTION, SOLUTION INTRAVENOUS at 22:34

## 2019-01-01 RX ADMIN — ASPIRIN 81 MG: 81 TABLET, CHEWABLE ORAL at 09:44

## 2019-01-01 RX ADMIN — NIFEDIPINE 30 MG: 30 TABLET, EXTENDED RELEASE ORAL at 14:03

## 2019-01-01 RX ADMIN — PANTOPRAZOLE SODIUM 40 MG: 40 INJECTION, POWDER, FOR SOLUTION INTRAVENOUS at 20:39

## 2019-01-01 RX ADMIN — METRONIDAZOLE 500 MG: 500 TABLET ORAL at 20:33

## 2019-01-01 RX ADMIN — Medication 10 ML: at 20:44

## 2019-01-01 RX ADMIN — CEFTRIAXONE SODIUM 1 G: 1 INJECTION, POWDER, FOR SOLUTION INTRAMUSCULAR; INTRAVENOUS at 12:24

## 2019-01-01 RX ADMIN — TICAGRELOR 90 MG: 90 TABLET ORAL at 10:30

## 2019-01-01 RX ADMIN — TICAGRELOR 90 MG: 90 TABLET ORAL at 09:20

## 2019-01-01 RX ADMIN — ATORVASTATIN CALCIUM 40 MG: 40 TABLET, FILM COATED ORAL at 20:39

## 2019-01-01 RX ADMIN — SODIUM CHLORIDE, PRESERVATIVE FREE 10 ML: 5 INJECTION INTRAVENOUS at 20:29

## 2019-01-01 RX ADMIN — PANTOPRAZOLE SODIUM 40 MG: 40 INJECTION, POWDER, FOR SOLUTION INTRAVENOUS at 08:43

## 2019-01-01 RX ADMIN — SUCRALFATE 1 G: 1 TABLET ORAL at 06:23

## 2019-01-01 RX ADMIN — LEVOTHYROXINE SODIUM 25 MCG: 25 TABLET ORAL at 09:41

## 2019-01-01 RX ADMIN — LEVOTHYROXINE SODIUM 50 MCG: 50 TABLET ORAL at 08:35

## 2019-01-01 RX ADMIN — SODIUM CHLORIDE: 9 INJECTION, SOLUTION INTRAVENOUS at 07:39

## 2019-01-01 RX ADMIN — SPIRONOLACTONE 25 MG: 25 TABLET ORAL at 16:34

## 2019-01-01 RX ADMIN — ENOXAPARIN SODIUM 30 MG: 30 INJECTION SUBCUTANEOUS at 08:40

## 2019-01-01 RX ADMIN — Medication 5 MG: at 14:59

## 2019-01-01 RX ADMIN — TICAGRELOR 90 MG: 90 TABLET ORAL at 08:34

## 2019-01-01 RX ADMIN — Medication 10 ML: at 08:42

## 2019-01-01 RX ADMIN — TICAGRELOR 90 MG: 90 TABLET ORAL at 20:28

## 2019-01-01 RX ADMIN — METOPROLOL TARTRATE 50 MG: 50 TABLET, FILM COATED ORAL at 21:46

## 2019-01-01 RX ADMIN — POLYETHYLENE GLYCOL 3350 17 G: 17 POWDER, FOR SOLUTION ORAL at 08:22

## 2019-01-01 RX ADMIN — PANTOPRAZOLE SODIUM 40 MG: 40 INJECTION, POWDER, FOR SOLUTION INTRAVENOUS at 07:40

## 2019-01-01 RX ADMIN — ACETAMINOPHEN 650 MG: 325 TABLET ORAL at 22:21

## 2019-01-01 RX ADMIN — DESMOPRESSIN ACETATE 40 MG: 0.2 TABLET ORAL at 21:49

## 2019-01-01 RX ADMIN — ATORVASTATIN CALCIUM 40 MG: 40 TABLET, FILM COATED ORAL at 21:15

## 2019-01-01 RX ADMIN — METRONIDAZOLE 500 MG: 500 TABLET ORAL at 21:16

## 2019-01-01 RX ADMIN — ENOXAPARIN SODIUM 30 MG: 30 INJECTION SUBCUTANEOUS at 15:25

## 2019-01-01 RX ADMIN — ACETAMINOPHEN 650 MG: 325 TABLET, FILM COATED ORAL at 05:33

## 2019-01-01 RX ADMIN — METOPROLOL TARTRATE 50 MG: 50 TABLET, FILM COATED ORAL at 07:39

## 2019-01-01 RX ADMIN — LEVOTHYROXINE SODIUM 50 MCG: 50 TABLET ORAL at 10:31

## 2019-01-01 RX ADMIN — FENTANYL CITRATE 25 MCG: 50 INJECTION, SOLUTION INTRAMUSCULAR; INTRAVENOUS at 06:23

## 2019-01-01 RX ADMIN — METRONIDAZOLE 500 MG: 500 TABLET ORAL at 08:26

## 2019-01-01 RX ADMIN — BUMETANIDE 1 MG: 1 TABLET ORAL at 08:27

## 2019-01-01 RX ADMIN — METRONIDAZOLE 500 MG: 500 TABLET ORAL at 11:28

## 2019-01-01 RX ADMIN — LEVOTHYROXINE SODIUM 50 MCG: 50 TABLET ORAL at 09:45

## 2019-01-01 RX ADMIN — ATORVASTATIN CALCIUM 40 MG: 40 TABLET, FILM COATED ORAL at 20:42

## 2019-01-01 RX ADMIN — POTASSIUM CHLORIDE 40 MEQ: 20 TABLET, EXTENDED RELEASE ORAL at 12:11

## 2019-01-01 RX ADMIN — TICAGRELOR 90 MG: 90 TABLET ORAL at 22:26

## 2019-01-01 RX ADMIN — METOPROLOL TARTRATE 25 MG: 25 TABLET ORAL at 08:42

## 2019-01-01 RX ADMIN — Medication 10 ML: at 08:54

## 2019-01-01 RX ADMIN — PANTOPRAZOLE SODIUM 40 MG: 40 INJECTION, POWDER, FOR SOLUTION INTRAVENOUS at 21:36

## 2019-01-01 RX ADMIN — Medication 10 ML: at 08:38

## 2019-01-01 RX ADMIN — SPIRONOLACTONE 25 MG: 25 TABLET ORAL at 08:26

## 2019-01-01 RX ADMIN — Medication 10 ML: at 22:29

## 2019-01-01 RX ADMIN — ENOXAPARIN SODIUM 40 MG: 40 INJECTION SUBCUTANEOUS at 09:27

## 2019-01-01 RX ADMIN — LISINOPRIL 10 MG: 10 TABLET ORAL at 08:42

## 2019-01-01 RX ADMIN — ACETAMINOPHEN 650 MG: 325 TABLET, FILM COATED ORAL at 05:03

## 2019-01-01 RX ADMIN — PNEUMOCOCCAL 13-VALENT CONJUGATE VACCINE 0.5 ML: 2.2; 2.2; 2.2; 2.2; 2.2; 4.4; 2.2; 2.2; 2.2; 2.2; 2.2; 2.2; 2.2 INJECTION, SUSPENSION INTRAMUSCULAR at 17:05

## 2019-01-01 RX ADMIN — FENTANYL CITRATE 75 MCG: 50 INJECTION INTRAMUSCULAR; INTRAVENOUS at 13:20

## 2019-01-01 RX ADMIN — MULTIPLE VITAMINS W/ MINERALS TAB 1 TABLET: TAB at 09:01

## 2019-01-01 RX ADMIN — IRON SUCROSE 300 MG: 20 INJECTION, SOLUTION INTRAVENOUS at 12:19

## 2019-01-01 RX ADMIN — METOPROLOL TARTRATE 50 MG: 50 TABLET, FILM COATED ORAL at 20:28

## 2019-01-01 RX ADMIN — ONDANSETRON 4 MG: 2 INJECTION INTRAMUSCULAR; INTRAVENOUS at 00:46

## 2019-01-01 RX ADMIN — FENTANYL CITRATE 25 MCG: 50 INJECTION INTRAMUSCULAR; INTRAVENOUS at 13:21

## 2019-01-01 RX ADMIN — TICAGRELOR 90 MG: 90 TABLET ORAL at 08:37

## 2019-01-01 RX ADMIN — SODIUM CHLORIDE: 9 INJECTION, SOLUTION INTRAVENOUS at 12:00

## 2019-01-01 RX ADMIN — TICAGRELOR 90 MG: 90 TABLET ORAL at 19:59

## 2019-01-01 RX ADMIN — SODIUM CHLORIDE, PRESERVATIVE FREE 10 ML: 5 INJECTION INTRAVENOUS at 21:28

## 2019-01-01 RX ADMIN — POLYETHYLENE GLYCOL 3350 17 G: 17 POWDER, FOR SOLUTION ORAL at 08:37

## 2019-01-01 RX ADMIN — METOPROLOL TARTRATE 50 MG: 50 TABLET, FILM COATED ORAL at 20:25

## 2019-01-01 RX ADMIN — SPIRONOLACTONE 25 MG: 25 TABLET ORAL at 17:34

## 2019-01-01 RX ADMIN — SODIUM CHLORIDE 250 ML: 9 INJECTION, SOLUTION INTRAVENOUS at 11:24

## 2019-01-01 RX ADMIN — SODIUM CHLORIDE 500 ML: 9 INJECTION, SOLUTION INTRAVENOUS at 09:52

## 2019-01-01 RX ADMIN — FERROUS SULFATE TAB 325 MG (65 MG ELEMENTAL FE) 325 MG: 325 (65 FE) TAB at 17:13

## 2019-01-01 RX ADMIN — SUCRALFATE 1 G: 1 TABLET ORAL at 00:39

## 2019-01-01 RX ADMIN — SUCRALFATE 1 G: 1 TABLET ORAL at 18:27

## 2019-01-01 RX ADMIN — METRONIDAZOLE 500 MG: 500 TABLET ORAL at 23:30

## 2019-01-01 RX ADMIN — SODIUM CHLORIDE 250 ML: 0.9 INJECTION, SOLUTION INTRAVENOUS at 07:41

## 2019-01-01 RX ADMIN — POTASSIUM CHLORIDE: 2 INJECTION, SOLUTION, CONCENTRATE INTRAVENOUS at 15:09

## 2019-01-01 RX ADMIN — SODIUM CHLORIDE, PRESERVATIVE FREE 10 ML: 5 INJECTION INTRAVENOUS at 20:45

## 2019-01-01 RX ADMIN — MULTIPLE VITAMINS W/ MINERALS TAB 1 TABLET: TAB at 09:20

## 2019-01-01 RX ADMIN — SODIUM CHLORIDE: 9 INJECTION, SOLUTION INTRAVENOUS at 22:16

## 2019-01-01 RX ADMIN — ASPIRIN 81 MG: 81 TABLET, CHEWABLE ORAL at 08:27

## 2019-01-01 RX ADMIN — METRONIDAZOLE 500 MG: 500 TABLET ORAL at 21:05

## 2019-01-01 RX ADMIN — AMLODIPINE BESYLATE 10 MG: 10 TABLET ORAL at 08:42

## 2019-01-01 RX ADMIN — METOPROLOL TARTRATE 50 MG: 50 TABLET ORAL at 08:39

## 2019-01-01 RX ADMIN — SODIUM CHLORIDE: 9 INJECTION, SOLUTION INTRAVENOUS at 12:46

## 2019-01-01 RX ADMIN — Medication 10 ML: at 09:21

## 2019-01-01 RX ADMIN — METOPROLOL TARTRATE 50 MG: 50 TABLET, FILM COATED ORAL at 20:48

## 2019-01-01 RX ADMIN — MULTIPLE VITAMINS W/ MINERALS TAB 1 TABLET: TAB at 08:38

## 2019-01-01 RX ADMIN — BUMETANIDE 1 MG: 1 TABLET ORAL at 11:28

## 2019-01-01 RX ADMIN — LEVOTHYROXINE SODIUM 50 MCG: 50 TABLET ORAL at 09:20

## 2019-01-01 RX ADMIN — METOPROLOL TARTRATE 50 MG: 50 TABLET, FILM COATED ORAL at 22:27

## 2019-01-01 RX ADMIN — SODIUM CHLORIDE, PRESERVATIVE FREE 10 ML: 5 INJECTION INTRAVENOUS at 10:30

## 2019-01-01 RX ADMIN — METOPROLOL TARTRATE 50 MG: 50 TABLET, FILM COATED ORAL at 20:44

## 2019-01-01 RX ADMIN — METOPROLOL TARTRATE 50 MG: 50 TABLET, FILM COATED ORAL at 21:15

## 2019-01-01 RX ADMIN — Medication 10 ML: at 21:14

## 2019-01-01 RX ADMIN — LABETALOL 20 MG/4 ML (5 MG/ML) INTRAVENOUS SYRINGE 10 MG: at 15:39

## 2019-01-01 RX ADMIN — SPIRONOLACTONE 50 MG: 25 TABLET, FILM COATED ORAL at 14:03

## 2019-01-01 RX ADMIN — SODIUM CHLORIDE, PRESERVATIVE FREE 10 ML: 5 INJECTION INTRAVENOUS at 20:33

## 2019-01-01 RX ADMIN — FUROSEMIDE 40 MG: 40 TABLET ORAL at 06:33

## 2019-01-01 RX ADMIN — ATORVASTATIN CALCIUM 40 MG: 40 TABLET, FILM COATED ORAL at 21:14

## 2019-01-01 RX ADMIN — PANTOPRAZOLE SODIUM 40 MG: 40 INJECTION, POWDER, FOR SOLUTION INTRAVENOUS at 08:54

## 2019-01-01 RX ADMIN — SODIUM CHLORIDE 250 ML: 9 INJECTION, SOLUTION INTRAVENOUS at 14:09

## 2019-01-01 RX ADMIN — TICAGRELOR 90 MG: 90 TABLET ORAL at 09:45

## 2019-01-01 RX ADMIN — SPIRONOLACTONE 25 MG: 25 TABLET ORAL at 08:34

## 2019-01-01 RX ADMIN — SPIRONOLACTONE 25 MG: 25 TABLET ORAL at 09:50

## 2019-01-01 RX ADMIN — FERROUS SULFATE TAB 325 MG (65 MG ELEMENTAL FE) 325 MG: 325 (65 FE) TAB at 07:43

## 2019-01-01 RX ADMIN — SPIRONOLACTONE 25 MG: 25 TABLET ORAL at 08:38

## 2019-01-01 RX ADMIN — FENTANYL CITRATE 50 MCG: 50 INJECTION INTRAMUSCULAR; INTRAVENOUS at 16:48

## 2019-01-01 RX ADMIN — METOPROLOL TARTRATE 50 MG: 50 TABLET, FILM COATED ORAL at 08:22

## 2019-01-01 RX ADMIN — LOSARTAN POTASSIUM 50 MG: 50 TABLET, FILM COATED ORAL at 07:40

## 2019-01-01 RX ADMIN — HYDROCODONE BITARTRATE AND ACETAMINOPHEN 1 TABLET: 5; 325 TABLET ORAL at 18:42

## 2019-01-01 RX ADMIN — METOPROLOL TARTRATE 50 MG: 50 TABLET, FILM COATED ORAL at 09:42

## 2019-01-01 RX ADMIN — FERROUS SULFATE TAB EC 325 MG (65 MG FE EQUIVALENT) 325 MG: 325 (65 FE) TABLET DELAYED RESPONSE at 08:22

## 2019-01-01 RX ADMIN — SUCRALFATE 1 G: 1 TABLET ORAL at 05:30

## 2019-01-01 RX ADMIN — SUCRALFATE 1 G: 1 TABLET ORAL at 23:33

## 2019-01-01 RX ADMIN — LEVOTHYROXINE SODIUM 50 MCG: 50 TABLET ORAL at 09:27

## 2019-01-01 RX ADMIN — SODIUM CHLORIDE, PRESERVATIVE FREE 10 ML: 5 INJECTION INTRAVENOUS at 21:12

## 2019-01-01 RX ADMIN — METOPROLOL TARTRATE 50 MG: 50 TABLET, FILM COATED ORAL at 22:03

## 2019-01-01 RX ADMIN — DESMOPRESSIN ACETATE 40 MG: 0.2 TABLET ORAL at 21:46

## 2019-01-01 RX ADMIN — SODIUM CHLORIDE, PRESERVATIVE FREE 10 ML: 5 INJECTION INTRAVENOUS at 08:34

## 2019-01-01 RX ADMIN — METOPROLOL TARTRATE 50 MG: 50 TABLET ORAL at 20:16

## 2019-01-01 RX ADMIN — SODIUM CHLORIDE, PRESERVATIVE FREE 10 ML: 5 INJECTION INTRAVENOUS at 21:48

## 2019-01-01 RX ADMIN — BUMETANIDE 2 MG: 1 TABLET ORAL at 09:08

## 2019-01-01 RX ADMIN — SUCRALFATE 1 G: 1 TABLET ORAL at 17:13

## 2019-01-01 RX ADMIN — ASPIRIN 81 MG: 81 TABLET, CHEWABLE ORAL at 09:41

## 2019-01-01 RX ADMIN — Medication 10 ML: at 23:30

## 2019-01-01 RX ADMIN — HYDRALAZINE HYDROCHLORIDE 50 MG: 50 TABLET, FILM COATED ORAL at 16:28

## 2019-01-01 RX ADMIN — LABETALOL 20 MG/4 ML (5 MG/ML) INTRAVENOUS SYRINGE 10 MG: at 00:18

## 2019-01-01 RX ADMIN — SPIRONOLACTONE 25 MG: 25 TABLET, FILM COATED ORAL at 08:55

## 2019-01-01 RX ADMIN — LEVOTHYROXINE SODIUM 50 MCG: 50 TABLET ORAL at 08:38

## 2019-01-01 RX ADMIN — Medication 10 ML: at 22:05

## 2019-01-01 RX ADMIN — BUMETANIDE 1 MG: 1 TABLET ORAL at 09:20

## 2019-01-01 ASSESSMENT — ENCOUNTER SYMPTOMS
SHORTNESS OF BREATH: 0
BACK PAIN: 0
COLOR CHANGE: 0
COUGH: 1
EYE DISCHARGE: 0
EYE REDNESS: 0
CHEST TIGHTNESS: 0
DIARRHEA: 0
BLOOD IN STOOL: 0
COLOR CHANGE: 0
VOMITING: 0
EYE DISCHARGE: 0
COUGH: 0
COLOR CHANGE: 0
CONSTIPATION: 0
SHORTNESS OF BREATH: 1
EYE ITCHING: 0
CONSTIPATION: 0
DIARRHEA: 0
EYE PAIN: 0
COUGH: 1
SHORTNESS OF BREATH: 1
EYE DISCHARGE: 0
DIARRHEA: 0
COUGH: 1
BLOOD IN STOOL: 0
ABDOMINAL DISTENTION: 0
COLOR CHANGE: 0
APNEA: 0
CHOKING: 0
SHORTNESS OF BREATH: 0
ABDOMINAL PAIN: 0
EYE PAIN: 0
CONSTIPATION: 0
APNEA: 0
CHEST TIGHTNESS: 0
CHEST TIGHTNESS: 0
ABDOMINAL PAIN: 0
DYSPNEA ACTIVITY LEVEL: NO INTERVAL CHANGE
EYE PAIN: 0
VOMITING: 0
COLOR CHANGE: 0
BLOOD IN STOOL: 0
EYE ITCHING: 0
EYE PAIN: 0
SORE THROAT: 0
NAUSEA: 0
SORE THROAT: 0
CHOKING: 0
RHINORRHEA: 0
BACK PAIN: 0
BACK PAIN: 0
COUGH: 0
ABDOMINAL DISTENTION: 0
SHORTNESS OF BREATH: 1
ROS SKIN COMMENTS: COCCYX
COUGH: 1
NAUSEA: 0
SHORTNESS OF BREATH: 0
CONSTIPATION: 1
BACK PAIN: 0
VOMITING: 0
WHEEZING: 0
NAUSEA: 0
SHORTNESS OF BREATH: 1
NAUSEA: 0
EYE REDNESS: 0
WHEEZING: 0
COUGH: 0
RHINORRHEA: 0
CHEST TIGHTNESS: 0
ABDOMINAL DISTENTION: 0
CHOKING: 0
ABDOMINAL PAIN: 0
EYE ITCHING: 0
SHORTNESS OF BREATH: 1
ABDOMINAL PAIN: 0
ABDOMINAL PAIN: 0
SHORTNESS OF BREATH: 1
DIARRHEA: 0
APNEA: 0
EYE REDNESS: 0

## 2019-01-01 ASSESSMENT — PAIN SCALES - GENERAL
PAINLEVEL_OUTOF10: 6
PAINLEVEL_OUTOF10: 7
PAINLEVEL_OUTOF10: 0
PAINLEVEL_OUTOF10: 8
PAINLEVEL_OUTOF10: 1
PAINLEVEL_OUTOF10: 5
PAINLEVEL_OUTOF10: 3
PAINLEVEL_OUTOF10: 5
PAINLEVEL_OUTOF10: 6
PAINLEVEL_OUTOF10: 4
PAINLEVEL_OUTOF10: 0
PAINLEVEL_OUTOF10: 0
PAINLEVEL_OUTOF10: 6
PAINLEVEL_OUTOF10: 0
PAINLEVEL_OUTOF10: 8
PAINLEVEL_OUTOF10: 0
PAINLEVEL_OUTOF10: 0
PAINLEVEL_OUTOF10: 8
PAINLEVEL_OUTOF10: 0
PAINLEVEL_OUTOF10: 8
PAINLEVEL_OUTOF10: 5
PAINLEVEL_OUTOF10: 8
PAINLEVEL_OUTOF10: 5
PAINLEVEL_OUTOF10: 0
PAINLEVEL_OUTOF10: 1
PAINLEVEL_OUTOF10: 5
PAINLEVEL_OUTOF10: 0
PAINLEVEL_OUTOF10: 0
PAINLEVEL_OUTOF10: 5
PAINLEVEL_OUTOF10: 0
PAINLEVEL_OUTOF10: 5
PAINLEVEL_OUTOF10: 0
PAINLEVEL_OUTOF10: 0
PAINLEVEL_OUTOF10: 5
PAINLEVEL_OUTOF10: 0
PAINLEVEL_OUTOF10: 6
PAINLEVEL_OUTOF10: 0
PAINLEVEL_OUTOF10: 8
PAINLEVEL_OUTOF10: 0
PAINLEVEL_OUTOF10: 0
PAINLEVEL_OUTOF10: 9
PAINLEVEL_OUTOF10: 5
PAINLEVEL_OUTOF10: 4
PAINLEVEL_OUTOF10: 0
PAINLEVEL_OUTOF10: 5
PAINLEVEL_OUTOF10: 5
PAINLEVEL_OUTOF10: 0
PAINLEVEL_OUTOF10: 0
PAINLEVEL_OUTOF10: 2
PAINLEVEL_OUTOF10: 7
PAINLEVEL_OUTOF10: 0
PAINLEVEL_OUTOF10: 4
PAINLEVEL_OUTOF10: 0
PAINLEVEL_OUTOF10: 2
PAINLEVEL_OUTOF10: 1
PAINLEVEL_OUTOF10: 1

## 2019-01-01 ASSESSMENT — PAIN DESCRIPTION - DESCRIPTORS
DESCRIPTORS: TENDER
DESCRIPTORS: BURNING;CRAMPING;ACHING
DESCRIPTORS: ACHING
DESCRIPTORS: TENDER
DESCRIPTORS: SHARP
DESCRIPTORS: TENDER

## 2019-01-01 ASSESSMENT — PULMONARY FUNCTION TESTS
PIF_VALUE: 0
PIF_VALUE: 10
PIF_VALUE: 1
PIF_VALUE: 15
PIF_VALUE: 15
PIF_VALUE: 13
PIF_VALUE: 12
PIF_VALUE: 1
PIF_VALUE: 14
PIF_VALUE: 13
PIF_VALUE: 1
PIF_VALUE: 0
PIF_VALUE: 0
PIF_VALUE: 26
PIF_VALUE: 14
PIF_VALUE: 13
PIF_VALUE: 14
PIF_VALUE: 0
PIF_VALUE: 9
PIF_VALUE: 14
PIF_VALUE: 0
PIF_VALUE: 13
PIF_VALUE: 13
PIF_VALUE: 11
PIF_VALUE: 0
PIF_VALUE: 1
PIF_VALUE: 14
PIF_VALUE: 1
PIF_VALUE: 11
PIF_VALUE: 0
PIF_VALUE: 1
PIF_VALUE: 1
PIF_VALUE: 13
PIF_VALUE: 1
PIF_VALUE: 2
PIF_VALUE: 3
PIF_VALUE: 13
PIF_VALUE: 13
PIF_VALUE: 0
PIF_VALUE: 1
PIF_VALUE: 5
PIF_VALUE: 13
PIF_VALUE: 0
PIF_VALUE: 13
PIF_VALUE: 0
PIF_VALUE: 1
PIF_VALUE: 14
PIF_VALUE: 0
PIF_VALUE: 14
PIF_VALUE: 0
PIF_VALUE: 14
PIF_VALUE: 13
PIF_VALUE: 14
PIF_VALUE: 4
PIF_VALUE: 10
PIF_VALUE: 1
PIF_VALUE: 2
PIF_VALUE: 14
PIF_VALUE: 0
PIF_VALUE: 0
PIF_VALUE: 13
PIF_VALUE: 0
PIF_VALUE: 0
PIF_VALUE: 13
PIF_VALUE: 1
PIF_VALUE: 14
PIF_VALUE: 13
PIF_VALUE: 17
PIF_VALUE: 4
PIF_VALUE: 11
PIF_VALUE: 0
PIF_VALUE: 0
PIF_VALUE: 1

## 2019-01-01 ASSESSMENT — PAIN DESCRIPTION - ORIENTATION
ORIENTATION: RIGHT;LEFT
ORIENTATION: LEFT

## 2019-01-01 ASSESSMENT — PAIN DESCRIPTION - LOCATION
LOCATION: BUTTOCKS
LOCATION: BACK
LOCATION: BUTTOCKS
LOCATION: BACK
LOCATION: BUTTOCKS
LOCATION: BUTTOCKS
LOCATION: HIP
LOCATION: BUTTOCKS
LOCATION: BUTTOCKS
LOCATION: BACK
LOCATION: BUTTOCKS
LOCATION: BACK
LOCATION: BUTTOCKS
LOCATION: BUTTOCKS

## 2019-01-01 ASSESSMENT — PAIN DESCRIPTION - PAIN TYPE
TYPE: ACUTE PAIN
TYPE: ACUTE PAIN
TYPE: CHRONIC PAIN
TYPE: ACUTE PAIN
TYPE: CHRONIC PAIN
TYPE: ACUTE PAIN

## 2019-01-01 ASSESSMENT — PAIN DESCRIPTION - FREQUENCY
FREQUENCY: INTERMITTENT
FREQUENCY: CONTINUOUS
FREQUENCY: INTERMITTENT

## 2019-01-01 ASSESSMENT — PAIN - FUNCTIONAL ASSESSMENT
PAIN_FUNCTIONAL_ASSESSMENT: 0-10
PAIN_FUNCTIONAL_ASSESSMENT: 0-10

## 2019-01-01 ASSESSMENT — PATIENT HEALTH QUESTIONNAIRE - PHQ9
SUM OF ALL RESPONSES TO PHQ QUESTIONS 1-9: 0
1. LITTLE INTEREST OR PLEASURE IN DOING THINGS: 0
2. FEELING DOWN, DEPRESSED OR HOPELESS: 0
SUM OF ALL RESPONSES TO PHQ QUESTIONS 1-9: 0
SUM OF ALL RESPONSES TO PHQ9 QUESTIONS 1 & 2: 0

## 2019-04-13 PROBLEM — J18.9 PNEUMONIA: Status: ACTIVE | Noted: 2019-01-01

## 2019-04-13 NOTE — PROGRESS NOTES
RN notified Dr. Chrissie Gould that the patient still had a BP of 195/72 and a HR of 88. New orders received.

## 2019-04-13 NOTE — H&P
600 MG extended release tablet Take 600 mg by mouth 2 times daily   Yes Historical Provider, MD   ibuprofen (ADVIL;MOTRIN) 800 MG tablet  14  Yes Historical Provider, MD   furosemide (LASIX) 20 MG tablet  14   Historical Provider, MD Raquel Hurley HCT 80-25 MG per tablet  14   Historical Provider, MD   Verapamil HCl  MG CP24  14   Historical Provider, MD   terbinafine (LAMISIL) 250 MG tablet Take 1 tablet by mouth daily. 14   Mayo Ballard DPM      Allergies: Other and Sulfa antibiotics    Social History:     Tobacco:    reports that she has never smoked. She has never used smokeless tobacco.  Alcohol:      reports that she does not drink alcohol. Drug Use:  reports that she does not use drugs. Family History:     Family History   Problem Relation Age of Onset    Other Mother         circulatory issues    Heart Disease Father     Stroke Father     Other Father         circulatory issues       Review of Systems:     Positive and Negative as described in HPI. Review of Systems    Constitutional: fever, fatigue  HENT: No change in vision or hearing   Respiratory: cough, SOB, wheezing. Cardiovascular:  No chest pain, palpitations, leg swelling. Gastrointestinal: No nausea, vomiting, diarrhea. Genitourinary: No increased frequency, urgency. Musculoskeletal: No myalgia or arthralgia. Skin: No rashes or scarring or bruises. Neurological: No headache, paresthesia, or focal weakness. Endo/Heme/Allergies: Negative for itchy eyes or runny nose. Psychiatric/Behavioral: No anxiety or depressed mood. Physical Exam:   BP (!) 228/86   Pulse 98   Temp 100.9 °F (38.3 °C) (Oral)   Resp 18   Ht 5' 8\" (1.727 m)   Wt (!) 320 lb 5.3 oz (145.3 kg)   SpO2 92%   BMI 48.71 kg/m²   Temp (24hrs), Av.7 °F (37.6 °C), Min:98.7 °F (37.1 °C), Max:100.9 °F (38.3 °C)    No results for input(s): POCGLU in the last 72 hours.     Intake/Output Summary (Last 24 hours) at 2019 [address] [place] [address] filed at 4/13/2019 1243  Gross per 24 hour   Intake 50 ml   Output --   Net 50 ml       Physical Exam     General Examination    General Resting comfortably in bed   Head Normocephalic, without obvious abnormality   Neck Supple, symmetrical   Lungs Respirations unlabored, wheezes L>R, rhonchi, crackles b/l. Chest Wall No deformity   Heart RRR, no murmur   Abdomen Soft. Non-tender, non-distended   Extremities No cyanosis or warmth. 2+ leg edema. Pulses 2+ and symmetric   Skin: Skin color, texture, turgor normal, no rashes or lesions   Neuro :   Alert, knows why she is here. Speech fluent. CN2-12 grossly intact. No gross sensory or motor asymmetries.     Investigations:     Laboratory Testing:  Recent Results (from the past 24 hour(s))   EKG 12 Lead    Collection Time: 04/13/19 11:09 AM   Result Value Ref Range    Ventricular Rate 117 BPM    Atrial Rate 117 BPM    P-R Interval 168 ms    QRS Duration 76 ms    Q-T Interval 314 ms    QTc Calculation (Bazett) 438 ms    P Axis 69 degrees    R Axis 36 degrees    T Axis 78 degrees   CBC with DIFF    Collection Time: 04/13/19 11:24 AM   Result Value Ref Range    WBC 7.0 3.5 - 11.0 k/uL    RBC 4.40 4.0 - 5.2 m/uL    Hemoglobin 13.6 12.0 - 16.0 g/dL    Hematocrit 39.4 36 - 46 %    MCV 89.5 80 - 100 fL    MCH 30.8 26 - 34 pg    MCHC 34.5 31 - 37 g/dL    RDW 14.7 11.5 - 14.9 %    Platelets 819 516 - 254 k/uL    MPV 6.7 6.0 - 12.0 fL    NRBC Automated NOT REPORTED per 100 WBC    Differential Type NOT REPORTED     Seg Neutrophils 81 (H) 36 - 66 %    Lymphocytes 10 (L) 24 - 44 %    Monocytes 6 1 - 7 %    Eosinophils % 2 0 - 4 %    Basophils 1 0 - 2 %    Immature Granulocytes NOT REPORTED 0 %    Segs Absolute 5.70 1.3 - 9.1 k/uL    Absolute Lymph # 0.70 (L) 1.0 - 4.8 k/uL    Absolute Mono # 0.40 0.1 - 1.3 k/uL    Absolute Eos # 0.10 0.0 - 0.4 k/uL    Basophils # 0.10 0.0 - 0.2 k/uL    Absolute Immature Granulocyte NOT REPORTED 0.00 - 0.30 k/uL    WBC Morphology NOT REPORTED     RBC Morphology NOT REPORTED     Platelet Estimate NOT REPORTED    Comprehensive Metabolic Panel    Collection Time: 04/13/19 11:24 AM   Result Value Ref Range    Glucose 135 (H) 70 - 99 mg/dL    BUN 11 8 - 23 mg/dL    CREATININE 0.63 0.50 - 0.90 mg/dL    Bun/Cre Ratio NOT REPORTED 9 - 20    Calcium 9.0 8.6 - 10.4 mg/dL    Sodium 140 135 - 144 mmol/L    Potassium 3.9 3.7 - 5.3 mmol/L    Chloride 101 98 - 107 mmol/L    CO2 26 20 - 31 mmol/L    Anion Gap 13 9 - 17 mmol/L    Alkaline Phosphatase 64 35 - 104 U/L    ALT 11 5 - 33 U/L    AST 15 <32 U/L    Total Bilirubin 0.53 0.3 - 1.2 mg/dL    Total Protein 7.7 6.4 - 8.3 g/dL    Alb 4.0 3.5 - 5.2 g/dL    Albumin/Globulin Ratio NOT REPORTED 1.0 - 2.5    GFR Non-African American >60 >60 mL/min    GFR African American >60 >60 mL/min    GFR Comment          GFR Staging NOT REPORTED    Troponin    Collection Time: 04/13/19 11:24 AM   Result Value Ref Range    Troponin, High Sensitivity 32 (H) 0 - 14 ng/L    Troponin T NOT REPORTED <0.03 ng/mL    Troponin Interp NOT REPORTED    Brain Natriuretic Peptide    Collection Time: 04/13/19  1:53 PM   Result Value Ref Range    Pro-BNP 1,080 (H) <300 pg/mL    BNP Interpretation Pro-BNP Reference Range:    Troponin    Collection Time: 04/13/19  1:53 PM   Result Value Ref Range    Troponin, High Sensitivity 41 (H) 0 - 14 ng/L    Troponin T NOT REPORTED <0.03 ng/mL    Troponin Interp NOT REPORTED        Imaging/Diagnostics:  Xr Chest Standard (2 Vw)    Result Date: 4/13/2019  EXAMINATION: TWO VIEWS OF THE CHEST, 4/13/2019 10:59 am COMPARISON: None HISTORY: ORDERING SYSTEM PROVIDED HISTORY: Elevated BP, SOB, cough. TECHNOLOGIST PROVIDED HISTORY: Elevated BP, SOB, cough. Ordering Physician Provided Reason for Exam: Elevated BP, SOB and cough. Acuity: Acute Type of Exam: Initial Additional signs and symptoms: Elevated BP, SOB and cough. FINDINGS: Evidence of COPD with hyperinflation.   A right lower lobe lung infiltrate is noted likely representing pneumonia. Congestion but no evidence of pulmonary edema. No evidence of acute pleural disease. Right lower lobe infiltrate likely represents pneumonia. Underlying COPD. Assessment :      Primary Problem  <principal problem not specified>    Active Hospital Problems    Diagnosis Date Noted    Pneumonia [J18.9] 04/13/2019       Plan:     Patient status Admit as inpatient in the  Progressive Unit/Step down    1. Hypertensive urgency - with elevated troponin and leg edema. SOB, CXR with effusions. 1. Monitor for AMS. Monitor CMP, troponin  2. Start labetalol 10 mg prn. Slowly lower SBP to <150.  3. F/u cxr in am when SBP is improved  4. F/u A1c, lipid panel, TSH   5. General diet    2. Troponin elevation - possible STEMI 2/2 HTN   Repeat EKG in am   Trend troponin   F/u TSH   Consider cardiology c/s    3. Suspect systolic CHF - with lung effusions, leg edema. Possibly with new MI   F/u echo    3. Possible pneumonia - fever 100.9   F/u procal, lactate - if suggesting bacterial process evaluate S. Pneumo Ag,    Cont rocephin, azithromycin for now     4. Hashimoto Thyroiditis - TPO AB+ 2014. Says she does not recall a thyroid dysfunction     5. dvt ppx - ppx lovenox   6. Discharge planning - needs new PCP and eye visit     Consultations:   IP CONSULT TO INTERNAL MEDICINE     Patient is admitted as inpatient status because of co-morbiditieslisted above, severity of signs and symptoms as outlined, requirement for current medical therapies and most importantly because of direct risk to patient if care not provided in a hospital setting. Reggie Bernstein DO  4/13/2019  2:56 PM    Copy sent to Dr. Leo primary care provider on file. IM Attending    Pt seen and examined today   I have discussed the care of pt , including pertinent history and exam findings,  with the resident. I have reviewed the key elements of all parts of the encounter with the resident.   I agree with the assessment, plan and orders as documented by the resident.     Electronically signed by Sapna Das MD on 4/13/2019 at 11:28 PM

## 2019-04-13 NOTE — ED PROVIDER NOTES
[facility]  eMERGENCY dEPARTMENT eNCOUnter      Pt Name: Sussy Saleem  MRN: [id number]  [place] 1945  Date of evaluation: 4/13/19      CHIEF COMPLAINT       Chief Complaint   Patient presents with    Cough     HISTORY OF PRESENT ILLNESS   HPI 75 y.o. female presents with complaints of cough and shortness of breath for the last 5-6 days. Patient denies any pain. Symptoms progressively worsening. She hasn't had any previous evaluations or attempted any treatments. She denies any fevers or chills. She denies any chest pain or orthopnea. She does have elevated blood pressure, she said that she hasn't been on any blood pressure medication for years. She denies any headache, or vision changes. She denies any chest pain. She does state that she's had some exertional shortness of breath for the last week. She also states that she has been having some bad sweating. REVIEW OF SYSTEMS     Review of Systems   Constitutional: Positive for diaphoresis and fatigue. Negative for fever. Eyes: Negative for visual disturbance. Respiratory: Positive for cough and shortness of breath. Cardiovascular: Negative for chest pain. Gastrointestinal: Negative for abdominal pain, nausea and vomiting. Genitourinary: Negative for dysuria. Musculoskeletal: Negative for arthralgias. Skin: Negative for rash. Neurological: Negative for headaches. Hematological: Negative for adenopathy.      PAST MEDICAL HISTORY     Past Medical History:   Diagnosis Date    Arthritis     Hypertension     Poor circulation        SURGICAL HISTORY       Past Surgical History:   Procedure Laterality Date    SKIN TAG REMOVAL      TOOTH EXTRACTION         CURRENT MEDICATIONS       Current Discharge Medication List      CONTINUE these medications which have NOT CHANGED    Details   diphenhydrAMINE (BENADRYL) 25 MG tablet Take 25 mg by mouth daily      guaiFENesin (MUCINEX) 600 MG extended release tablet Take 600 mg by mouth 2 times daily      ibuprofen (ADVIL;MOTRIN) 800 MG tablet       furosemide (LASIX) 20 MG tablet       MICARDIS HCT 80-25 MG per tablet       Verapamil HCl  MG CP24       terbinafine (LAMISIL) 250 MG tablet Take 1 tablet by mouth daily. Qty: 30 tablet, Refills: 2             ALLERGIES     is allergic to other and sulfa antibiotics. FAMILY HISTORY     indicated that the status of her mother is unknown. She indicated that the status of her father is unknown. SOCIAL HISTORY      reports that she has never smoked. She has never used smokeless tobacco. She reports that she does not drink alcohol or use drugs. PHYSICAL EXAM     INITIAL VITALS: BP (!) 195/72   Pulse 88   Temp 101 °F (38.3 °C)   Resp 18   Ht 5' 8\" (1.727 m)   Wt (!) 320 lb 5.3 oz (145.3 kg)   SpO2 93%   BMI 48.71 kg/m²   Gen.: NAD  Head: Normocephalic, atraumatic  Eye: Pupils equal round reactive to light, no conjunctivitis  No JVD  Heart: Regular rate and rhythm no murmurs  Lungs: Bilateral rhonchi, no respiratory distress  Chest wall: No crepitus, no tenderness palpation  Abdomen: Soft, nontender, nondistended, with no peritoneal signs  Neurologic: Patient is alert and oriented x3, motor and sensation is intact in all 4 extremities, fluent speech, normal coordination. Extremities: No unequal edema or calf tenderness to palpation. MEDICAL DECISION MAKING:     MDM this is an elderly female presenting with a cough and shortness of breath. Significantly elevated blood pressure. We will evaluate for renal failure, congestive heart failure, ACS, or pneumonia. Hospital course:  EKG shows a sinus tachycardia, otherwise no evidence of any acute ischemia. Chest x-ray shows a right lower lobe infiltrate Pt's o2 sat decreased to 87% on ambulation. In regards to her pna, starting her on azithromycin and ceftriaxone. Initial troponin 32, repeat 41, bnp elevated.   I believe this is likely secondary to her severe hypertension. Treating her with IV labetalol. Admitting to the hospital.         DIAGNOSTIC RESULTS     EKG: All EKG's are interpreted by the Emergency Department Physician who either signs or Co-signs this chart in the absence of a cardiologist.  EKG shows a sinus tachycardia, heart rate of 117, , QRS of 76, , there is no ST segment elevation or depression, there is no T-wave inversions, the axis is normal.    RADIOLOGY:All plain film, CT, MRI, and formal ultrasound images (except ED bedside ultrasound) are read by the radiologist and the images and interpretations are directly viewed by the emergency physician. XR CHEST STANDARD (2 VW)   Final Result   Right lower lobe infiltrate likely represents pneumonia. Underlying COPD. XR CHEST PORTABLE    (Results Pending)       LABS: All lab results were reviewed by myself, and all abnormals are listed below.   Labs Reviewed   CBC WITH AUTO DIFFERENTIAL - Abnormal; Notable for the following components:       Result Value    Seg Neutrophils 81 (*)     Lymphocytes 10 (*)     Absolute Lymph # 0.70 (*)     All other components within normal limits   COMPREHENSIVE METABOLIC PANEL - Abnormal; Notable for the following components:    Glucose 135 (*)     All other components within normal limits   TROPONIN - Abnormal; Notable for the following components:    Troponin, High Sensitivity 32 (*)     All other components within normal limits   BRAIN NATRIURETIC PEPTIDE - Abnormal; Notable for the following components:    Pro-BNP 1,080 (*)     All other components within normal limits   TROPONIN - Abnormal; Notable for the following components:    Troponin, High Sensitivity 41 (*)     All other components within normal limits   TSH WITH REFLEX - Abnormal; Notable for the following components:    TSH 5.59 (*)     All other components within normal limits   PROCALCITONIN - Abnormal; Notable for the following components:    Procalcitonin 0.09 (*)     All other components within normal limits   T4, FREE   MAGNESIUM   HEMOGLOBIN A1C   TROP/MYOGLOBIN   TROP/MYOGLOBIN   LACTIC ACID       EMERGENCY DEPARTMENT COURSE:   Vitals:    Vitals:    04/13/19 1411 04/13/19 1530 04/13/19 1600 04/13/19 1630   BP: (!) 228/86 (!) 214/74 (!) 203/76 (!) 195/72   Pulse: 98 97 91 88   Resp: 18  18    Temp: 100.9 °F (38.3 °C)  101 °F (38.3 °C)    TempSrc: Oral      SpO2: 92%  93%    Weight: (!) 320 lb 5.3 oz (145.3 kg)      Height: 5' 8\" (1.727 m)          The patient was given the following medications while in the emergency department:  Orders Placed This Encounter   Medications    labetalol (NORMODYNE;TRANDATE) injection 10 mg    cefTRIAXone (ROCEPHIN) 1 g IVPB in 50 mL D5W minibag    azithromycin (ZITHROMAX) tablet 500 mg    sodium chloride flush 0.9 % injection 10 mL    sodium chloride flush 0.9 % injection 10 mL    acetaminophen (TYLENOL) tablet 650 mg    DISCONTD: enoxaparin (LOVENOX) injection 40 mg    pneumococcal 13-valent conjugate (PREVNAR) injection 0.5 mL    enoxaparin (LOVENOX) injection 30 mg    labetalol (NORMODYNE;TRANDATE) injection 10 mg    sodium chloride flush 0.9 % injection 10 mL    sodium chloride flush 0.9 % injection 10 mL    OR Linked Order Group     potassium chloride (KLOR-CON M) extended release tablet 40 mEq     potassium bicarb-citric acid (EFFER-K) effervescent tablet 40 mEq     potassium chloride 10 mEq/100 mL IVPB (Peripheral Line)    magnesium sulfate 1 g in dextrose 5% 100 mL IVPB    magnesium hydroxide (MILK OF MAGNESIA) 400 MG/5ML suspension 30 mL    ondansetron (ZOFRAN) injection 4 mg    DISCONTD: acetaminophen (TYLENOL) tablet 650 mg    amLODIPine (NORVASC) tablet 5 mg    azithromycin (ZITHROMAX) 500 mg in D5W 250ml addavial    cefTRIAXone (ROCEPHIN) 1 g IVPB in 50 mL D5W minibag     -------------------------  CRITICAL CARE:   CONSULTS: IP CONSULT TO INTERNAL MEDICINE  PROCEDURES: Procedures     FINAL IMPRESSION      1.  Pneumonia due to organism    2. Hypertensive emergency          DISPOSITION/PLAN   DISPOSITION Admitted 04/13/2019 12:23:35 PM      PATIENT REFERRED TO:  No follow-up provider specified.     DISCHARGE MEDICATIONS:  Current Discharge Medication List            Génesis Frausto MD  Attending Emergency Physician                      Génesis Frausto MD  04/13/19 5461

## 2019-04-13 NOTE — ED NOTES
Pt states onset of s/s's was 6 days ago. Pt c/o nonproductive cough. Pt arrives A+O x 4, GCS = 15, PMS x 4 intact, eupneic, and PWD. Pulse is regular et strong but tachycardic. Lung sounds clear t/o bilat but coarse. Pt speaks in complete sentences without difficulty. Pt has slight bilat lower leg pitting edema.      Leighann Casarez RN  04/13/19 4879

## 2019-04-14 PROBLEM — I16.0 HYPERTENSIVE URGENCY: Status: ACTIVE | Noted: 2019-01-01

## 2019-04-14 NOTE — PLAN OF CARE
Problem: Falls - Risk of:  Goal: Will remain free from falls  Description  Will remain free from falls  4/14/2019 1919 by Rosaura Moreira RN  Outcome: Met This Shift  No falls this shift. Patient is alert and oriented. Call light is within reach. Problem: Airway Clearance - Ineffective:  Goal: Clear lung sounds  Rhonchi throughout.

## 2019-04-14 NOTE — CARE COORDINATION
CASE MANAGEMENT NOTE:    Admission Date:  4/13/2019 Micha Leggett is a 75 y.o.  female    Admitted for : Pneumonia [J18.9]    Met with:  Patient    PCP:  Does not have one but is happy with the MD that is seeing her here - Would like set-up with [facility]:  Medicare      Current Residence/ Living Arrangements:  independently at home             Current Services PTA:  No    Is patient agreeable to VNS: No    Freedom of choice provided: Yes    List of [address] provided: Yes    VNS chosen:  No    DME:  straight cane, walker, shower chair and scooter and grab bars    Home Oxygen: No    Nebulizer: No    CPAP/BIPAP: No    Supplier: N/A    Potential Assistance Needed: Yes - Needs PCP appt scheduled    SNF needed: No    Pharmacy:  [address]       Is the Patient an [employer] with Readmission Risk Score greater than 14%? No  If yes, pt needs a follow up appointment made within 7 days. Does Patient want to use MEDS to BEDS? No    Family Members/Caregivers that pt would like involved in their care:    Yes    If yes, list name here:  Deven Adair    Transportation Provider:  Patient             Is patient in Isolation/One on One/Altered Mental Status? No  If yes, skip next question. If no, would they like an I-Pad to  use? No  If yes, call [phone]. Discharge Plan:  4/14: MEDICARE - Patient is from 2-story home with livable 1st floor alone. She is independent with her care and drives. DME - Cane, walker, scooter, SC, GB. Declines need for VNS services. Needs new PCP appt scheduled - Would like [address] - Need to schedule on Monday. On IV zith, IV rocephin, ECHO ordered. Will follow.  //AMADEO                 Electronically signed by: Awa Godwin RN on 4/14/2019 at 10:23 AM

## 2019-04-14 NOTE — FLOWSHEET NOTE
Patient was receptive to 's visit. No needs expressed at this time. Her  will in to visit. Chaplains will remain available to offer spiritual and emotional support as needed.      04/14/19 1150   Encounter Summary   Services provided to: Patient   Referral/Consult From: [address] [facility] Family members   Place of [address] [place] [address],[facility], [address] [place] Completed   Continue Visiting   (4/14/19)   Complexity of Encounter Low   Length of Encounter 30 minutes   Spiritual Assessment Completed Yes   Routine   Type Initial   Assessment Approachable;Calm   Intervention Active listening;Explored feelings, thoughts, concerns;Prayer;Sustaining presence/ Ministry of presence   Outcome Expressed gratitude;Engaged in conversation

## 2019-04-14 NOTE — PROGRESS NOTES
[address] [facility].    PROGRESS NOTE             2019    10:51 AM    Name:   Boy Velez  MRN:     [id number]     Acct:      [de-identified]   Room:      Day:  1  Admit Date:  2019 10:14 AM    PCP:  No primary care provider on file. Code Status:  Full Code    Subjective:     C/C:   Chief Complaint   Patient presents with    Cough     Interval History Status: improved. Patient reports improvement overnight, cough decreased, feels less lethargic today  Patient remains hypertensive to 180s/70s, improved from 240s SBP  Increased Lisinopril to 20mg daily  Procal 0.09, WBC normal: discontinued abx    Brief History:     Per Epic EMR:    The patient is a 75 y.o. f h/o HTN, hypothyroid presents to urgent care with a cough/SOB and she is admitted to the hospital for the management of hypertension, cough and SOB on exertion 1 week. No CP. Diaphoretic Monday and Tuesday. No change in mentation, no change in vision, not experienced any seizures, no new weakness or numbness. No h/o tobacco use. Not been to a doctor, or taken medication for HTN for 6 months after her doctor moved. Overnight prior to arrival she was feeling SOB with abnormal breath sounds. Went to urgent care. . Advised to go to ED. Mom  of heart attack in 52's. Dad heart attach 80, and he had a stroke in 52's.      . Temp 100.9. Troponin elevated, ST changes on EKG. BNP 1000. Admitted HTN emergency.  with labetolol 10 mg. Review of Systems:     Review of Systems   Constitutional: Positive for fatigue (Improved). Negative for chills and fever. Respiratory: Positive for cough (Improved). Negative for chest tightness, shortness of breath and wheezing. Cardiovascular: Negative for chest pain and palpitations. Gastrointestinal: Negative for abdominal pain, constipation, diarrhea, nausea and vomiting.    Genitourinary: Negative for difficulty urinating, dysuria, flank pain, hematuria and urgency. Musculoskeletal: Negative for back pain and myalgias. Neurological: Negative for syncope, light-headedness and numbness. Medications: Allergies: Allergies   Allergen Reactions    Other Hives     Pepsi cola    Sulfa Antibiotics Hives       Current Meds:   Scheduled Meds:    guaiFENesin  600 mg Oral BID    [START ON 4/15/2019] lisinopril  20 mg Oral Daily    sodium chloride flush  10 mL Intravenous 2 times per day    pneumococcal 13-valent conjugate  0.5 mL Intramuscular Once    enoxaparin  30 mg Subcutaneous BID    sodium chloride flush  10 mL Intravenous 2 times per day    metoprolol tartrate  25 mg Oral BID    amLODIPine  10 mg Oral Daily     Continuous Infusions:   PRN Meds: sodium chloride flush, acetaminophen, labetalol, sodium chloride flush, potassium chloride **OR** potassium alternative oral replacement **OR** potassium chloride, magnesium sulfate, magnesium hydroxide, ondansetron, hydrALAZINE    Data:     Past Medical History:   has a past medical history of Arthritis, Hypertension, and Poor circulation. Social History:   reports that she has never smoked. She has never used smokeless tobacco. She reports that she does not drink alcohol or use drugs. Family History:   Family History   Problem Relation Age of Onset    Other Mother         circulatory issues    Heart Disease Father     Stroke Father     Other Father         circulatory issues       Vitals:  BP (!) 171/59   Pulse 79   Temp 99.5 °F (37.5 °C) (Oral)   Resp 20   Ht 5' 8\" (1.727 m)   Wt (!) 321 lb 3.4 oz (145.7 kg)   SpO2 91%   BMI 48.84 kg/m²   Temp (24hrs), Av.6 °F (37.6 °C), Min:98.3 °F (36.8 °C), Max:101 °F (38.3 °C)    No results for input(s): POCGLU in the last 72 hours. I/O(24Hr):     Intake/Output Summary (Last 24 hours) at 2019 1051  Last data filed at 2019 0630  Gross per 24 hour   Intake 1490 ml   Output 1800 ml   Net -310 ml       Labs:    [unfilled]    No results found for: SPECIAL  No results found for: CULTURE    [unfilled]    Radiology:    Xr Chest Standard (2 Vw)    Result Date: 4/13/2019  EXAMINATION: TWO VIEWS OF THE CHEST, 4/13/2019 10:59 am COMPARISON: None HISTORY: ORDERING SYSTEM PROVIDED HISTORY: Elevated BP, SOB, cough. TECHNOLOGIST PROVIDED HISTORY: Elevated BP, SOB, cough. Ordering Physician Provided Reason for Exam: Elevated BP, SOB and cough. Acuity: Acute Type of Exam: Initial Additional signs and symptoms: Elevated BP, SOB and cough. FINDINGS: Evidence of COPD with hyperinflation. A right lower lobe lung infiltrate is noted likely representing pneumonia. Congestion but no evidence of pulmonary edema. No evidence of acute pleural disease. Right lower lobe infiltrate likely represents pneumonia. Underlying COPD. Xr Chest Portable    Result Date: 4/14/2019  EXAMINATION: SINGLE XRAY VIEW OF THE CHEST 4/14/2019 6:10 am COMPARISON: Two views of the chest 04/13/2019 HISTORY: ORDERING SYSTEM PROVIDED HISTORY: RLL PNA TECHNOLOGIST PROVIDED HISTORY: RLL PNA Ordering Physician Provided Reason for Exam: F/u RLL pneumonia Acuity: Acute Type of Exam: Initial Additional signs and symptoms: F/u RLL pneumonia FINDINGS: Heart size and mediastinal contour similar to previous exam given portable technique. Left lung is clear. Near complete resolution of right infrahilar opacity; mild residual streaky opacity is noted. The costophrenic angles are preserved. Small portion of the lung apices excluded from the field of view. Substantially improved right basilar aeration. Physical Examination:        Physical Exam   Constitutional: She is oriented to person, place, and time. She appears well-developed and well-nourished. No distress. HENT:   Head: Normocephalic and atraumatic. Cardiovascular: Normal rate and regular rhythm. Exam reveals no gallop and no friction rub.    No murmur heard.  Pulmonary/Chest: Effort normal. No respiratory distress. She has no wheezes. She has no rales. Abdominal: Soft. Bowel sounds are normal. She exhibits no distension. There is no tenderness. Neurological: She is alert and oriented to person, place, and time. Skin: She is not diaphoretic. Assessment:        Primary Problem  Hypertensive urgency    Active Hospital Problems    Diagnosis Date Noted    Hypertensive urgency [I16.0] 04/14/2019    Hypertension [I10]        Plan:        Hypertensive urgency secondary to medication noncompliance  Patient reports stopping all home medications 6 months ago as patient \"didn't feel like taking them\"  SBP 240s on admission, now 180s  Increase Lisinopril to 20mg daily  Norvasc 10mg daily  Lopressor 25mg BID  PRN hydralazine 10mg IV q6h and labetalol 10mg IV q4h  F/u echo, completed but not read yet    Concern for PNA  Unlikely, procal 0.09, no WBC elevation  Febrile, however resolved this AM  CXR clear of infiltrates, no evidence of consolidation on physical exam  Discontinue antibiotics    Elevated troponin  Likely secondary to HTN urgency  Troponin 41, 37 on repeat  EKG on admission nonspecific EKG changes  F/u echo  Repeat EKG this morning ordered    Hypothyroidism  Asymptomatic  TPO Antibody positive in 2014  TSH 5.59  FT4 0.93  Will hold on treatment at this time    Maintenance:  Lovenox    Dispo:  SW, PT/OT  Needs PCP, will attempt to schedule with our service    Ekta Bell MD  4/14/2019  10:51 AM       IM Attending    Pt seen and examined today   I have discussed the care of pt , including pertinent history and exam findings,  with the resident. I have reviewed the key elements of all parts of the encounter with the resident. I agree with the assessment, plan and orders as documented by the resident.       HTN urgency   Pt has not been using meds for last 6 months  Add hydralazine 50 tid and diuretic   Electronically signed by Alethea Jeffery

## 2019-04-14 NOTE — PROGRESS NOTES
Physical Therapy    Facility/Department: [facility] PROGRESSIVE CARE  Initial Assessment    NAME: Lui Infante  : 1945  MRN: [id number]    Date of Service: 2019    Discharge Recommendations:  Home with assist PRN   PT Equipment Recommendations  Equipment Needed: No    Assessment   Body structures, Functions, Activity limitations: Decreased endurance;Decreased functional mobility   Assessment: pt with limited activity tolerance due to pneumonia- may need family help at home for ADL/housework  Treatment Diagnosis: impaired mobility  Specific instructions for Next Treatment: stairs for d/c  Prognosis: Good  Decision Making: Low Complexity  Barriers to Learning: none  REQUIRES PT FOLLOW UP: Yes  Activity Tolerance  Activity Tolerance: Patient limited by endurance       Patient Diagnosis(es): The primary encounter diagnosis was Pneumonia due to organism. A diagnosis of Hypertensive emergency was also pertinent to this visit. has a past medical history of Arthritis, Hypertension, and Poor circulation.    has a past surgical history that includes Tooth Extraction and Skin tag removal.    Restrictions  Restrictions/Precautions  Restrictions/Precautions: Fall Risk  Required Braces or Orthoses?: No  Vision/Hearing  Vision: Impaired  Vision Exceptions: Wears glasses at all times  Hearing: Within functional limits     Subjective  General  Chart Reviewed: Yes  Patient assessed for rehabilitation services?: Yes  Response To Previous Treatment: Not applicable  Family / Caregiver Present: No  Referring Practitioner: Berry Marti  Referral Date : 19  Diagnosis: pneumonia  Follows Commands: Within Functional Limits  Pain Screening  Patient Currently in Pain: No  Vital Signs  Patient Currently in Pain: No       Orientation  Orientation  Overall Orientation Status: Within Normal Limits  Social/Functional History  Social/Functional History  Lives With: Alone  Type of Home: House  Home Layout: One level  Home Access: Stairs to enter with rails  Entrance Stairs - Number of Steps: 4  Entrance Stairs - Rails: Both  Bathroom Shower/Tub: Shower chair with back  [employer]: Grab bars in shower  Home Equipment: Rolling walker, Cane, Electric scooter, Grab bars  Receives Help From: Family  ADL Assistance: [address]: [address] [facility] Responsibilities: Yes  Ambulation Assistance: Independent(furniture walks inthe home, uses cane or RW in community, scooter for long distances)  Transfer Assistance: Independent  Active : Yes  Occupation: Retired  Cognition        Objective     Observation/Palpation  Observation: pt on room air, SOB with mobility    AROM RLE (degrees)  RLE AROM: WNL  AROM LLE (degrees)  LLE AROM : WNL  AROM RUE (degrees)  RUE AROM : WNL  AROM LUE (degrees)  LUE AROM : WNL  Strength RLE  Strength RLE: WFL  Strength LLE  Strength LLE: WFL  Strength RUE  Strength RUE: WFL  Strength LUE  Strength LUE: WFL        Bed mobility  Rolling to Right: Independent  Supine to Sit: Independent  Scooting: Independent  Transfers  Sit to Stand: Modified independent  Stand to sit: Modified independent  Bed to Chair: Modified independent(cane)  Ambulation  Ambulation?: Yes  More Ambulation?: No  Ambulation 1  Surface: level tile  Device: Single point cane  Assistance: Minimal assistance  Quality of Gait: slow paced, wide AFSANEH, pt is SOB, unable to talk while amb upon return to room.   Distance: 80 ft  Comments: no LOB, uses taylor railing instead of PT when available  Stairs/Curb  Stairs?: No     Balance  Posture: Good(rounded shoulders,mild kyphosis)  Sitting - Static: Good  Sitting - Dynamic: Good  Standing - Static: Good  Standing - Dynamic: +;Fair        Plan   Plan  Times per week: 5-7  Times per day: Daily  Specific instructions for Next Treatment: stairs for d/c  Current Treatment Recommendations: Stair training, Gait Training, Balance Training, Safety Education & Training  Safety Devices  Type of devices:  All fall risk precautions in place, Left in chair, Call light within reach, Nurse notified, Patient at risk for falls    G-Code       OutComes Score                                                  AM-PAC Score             Goals  Short term goals  Time Frame for Short term goals: 2 days  Short term goal 1: mod Ind amb household distances with st cane  Short term goal 2: mod ind 4 steps with 2 hR  Short term goal 3: good standing balance  Patient Goals   Patient goals : go home tomorrow       Therapy Time   Individual Concurrent Group Co-treatment   Time In 0918         Time Out 0938         Minutes [address] Lamb, PT

## 2019-04-14 NOTE — CONSULTS
Date:   4/14/2019  Patient name: Mickie Moser  Date of admission:  4/13/2019 10:14 AM  MRN:   [id number]  YOB: 1945  PCP: No primary care provider on file. Reason for Admission: Pneumonia [J18.9]    Cardiology consult : Accelerated hypertension, diastolic CHF acute on chronic        History of presenting illness: 21-year-old female who lives alone and who has been independent in her activities of daily living admitted with increasing shortness of breath, cough and accelerated hypertension. Her systolic blood pressure at one time was 230 mmHg. She has been having symptoms for the last 5 or 6 days. She did experience some hot flashes but no fever or chills . She stopped taking her hypertension medication about 6 months ago. She did not have any chest pain or palpitation, no syncope. She hasn't been in hospital for more than 30 years    Labs 4/13/2019 sodium 140, potassium 3.9, BUNs 11, creatinine 0.60, calcium 9.0 total protein 7.7, Pro BNP 1080, myoglobin 253, severity troponin 41 and 37  Hemoglobin 13.6, WBC 7.0, platelets 923  ECG 9/26/6132 sinus rhythm heart rate 84, probable LVH  ECG 4/13/2019 sinus tachycardia heart rate 117, probable LVH, nonspecific T-wave changes  Chest x-ray on 4/13/2019 evidence of COPD with hyperinflation, right lower lobe lung infiltrates.     Vitals: BP (!) 208/64   Pulse 91   Temp 97.8 °F (36.6 °C) (Oral)   Resp 20   Ht 5' 8\" (1.727 m)   Wt (!) 321 lb 3.4 oz (145.7 kg)   SpO2 94%   BMI 48.84 kg/m²     Patient seen and examined and discussed with the patient nurse  She is a morbidly obese female, resting at the edge of bed, did not appear in any significant distress  Denies chest pain or palpitation    Medications:   Scheduled Meds:   guaiFENesin  600 mg Oral BID    [START ON 4/15/2019] lisinopril  20 mg Oral Daily    furosemide  20 mg Intravenous Daily    hydrALAZINE  50 mg Oral 3 times per day    metoprolol tartrate  50 mg Oral BID    levofloxacin 500 mg Oral Daily    sodium chloride flush  10 mL Intravenous 2 times per day    pneumococcal 13-valent conjugate  0.5 mL Intramuscular Once    enoxaparin  30 mg Subcutaneous BID    sodium chloride flush  10 mL Intravenous 2 times per day    amLODIPine  10 mg Oral Daily     Continuous Infusions:  CBC:   Recent Labs     04/13/19  1124 04/14/19  0504   WBC 7.0 6.1   HGB 13.6 12.4    276     BMP:    Recent Labs     04/13/19  1124 04/14/19  0504    140   K 3.9 3.9    100   CO2 26 29   BUN 11 11   CREATININE 0.63 0.79   GLUCOSE 135* 159*     Hepatic:   Recent Labs     04/13/19  1124   AST 15   ALT 11   BILITOT 0.53   ALKPHOS 64     Troponin: No results for input(s): TROPONINI in the last 72 hours. BNP: No results for input(s): BNP in the last 72 hours. Lipids: No results for input(s): CHOL, HDL in the last 72 hours. Invalid input(s): LDLCALCU  INR: No results for input(s): INR in the last 72 hours. Objective:   Vitals: BP (!) 208/64   Pulse 91   Temp 97.8 °F (36.6 °C) (Oral)   Resp 20   Ht 5' 8\" (1.727 m)   Wt (!) 321 lb 3.4 oz (145.7 kg)   SpO2 94%   BMI 48.84 kg/m²   General appearance: alert and cooperative with exam  HEENT: Head: Normal, normocephalic, atraumatic. Neck: no adenopathy, no carotid bruit, supple, symmetrical, trachea midline and thyroid not enlarged, symmetric, no tenderness/mass/nodules  Lungs: diminished breath sounds bibasilar  Heart: regular rate and rhythm  Abdomen: Very obese, soft, no localized tenderness  Extremities: Mild edema, erythema  Neurologic: Mental status: Alert, oriented, thought content appropriate    EKG: Normal sinus rhythm heart rate 80-90, probable LVH. ECHO: ordered, but not yet obtained. Ejection fraction: Not known %  Stress Test: not obtained. Cardiac Angiography: not obtained.         Assessment / Acute Cardiac Problems:   Patient admitted with accelerated hypertension systolic blood pressure 140  Diastolic CHF acute on chronic  Borderline abnormal troponin significance not clear, most likely secondary to endocardial injury secondary to increased end-diastolic pressure  No significant ECG abnormalities suggestive of ischemia or acute injury  Morbid obesity, BMI 49          Patient Active Problem List:     Hypertension     Arthritis     Poor circulation     Pneumonia     Hypertensive urgency      Plan of Treatment:   DC Norvasc, changed to Procardia 30 mg a day, add Aldactone 50 mg a day    Electronically signed by Davida Lacey MD on 4/14/2019 at 1:31 PM

## 2019-04-14 NOTE — PLAN OF CARE
Problem: Falls - Risk of:  Goal: Will remain free from falls  Description  Will remain free from falls  Outcome: Met This Shift     Problem: Falls - Risk of:  Goal: Absence of physical injury  Description  Absence of physical injury  Outcome: Met This Shift     Problem: Airway Clearance - Ineffective:  Goal: Clear lung sounds  Description  Clear lung sounds  Outcome: Ongoing     Problem: Airway Clearance - Ineffective:  Goal: Ability to maintain a clear airway will improve  Description  Ability to maintain a clear airway will improve  Outcome: Ongoing

## 2019-04-15 NOTE — CARE COORDINATION
BPCI-A Medical Bundle Patient. Working DR  pneumonia  Admitting Location: [facility]    90-day post-acute tracking and outreach with qualifying DRG from date of discharge.

## 2019-04-15 NOTE — CARE COORDINATION
ONGOING DISCHARGE PLAN:    Spoke with patient regarding discharge plan and patient confirms that plan is to go home with no needs. Remains on IV lasix    Will continue to follow for additional discharge needs.     Electronically signed by Iris Garcia RN on 4/15/2019 at 1:26 PM

## 2019-04-15 NOTE — PROGRESS NOTES
[facility] SERVICE       Discharge Summary     Patient ID: Boy Velez  :  1945   MRN: [id number]     ACCOUNT:  [de-identified]   Patient's PCP: No primary care provider on file. Admit Date: 2019   Discharge Date: 4/15/2019     Length of Stay: 2  Code Status:  Full Code  Admitting Physician: Angel March MD  Discharge Physician: Kalyani Liz MD     Active Discharge Diagnoses:     Hospital Problem Lists:  Principal Problem:    Hypertensive urgency  Active Problems:    Hypertension  Resolved Problems:    * No resolved hospital problems. *      Admission Condition:  good     Discharged Condition: good    Hospital Stay:     Hospital Course: Boy Velez is a 75 y.o. female who was admitted for the management of   Hypertensive urgency , presented to ER with Cough    The patient is a 66 y. o. f h/o HTN, hypothyroid presents to urgent care with a cough/SOB and she is admitted to the hospital for the management of hypertension, cough and SOB on exertion 1 week. No CP. Diaphoretic Monday and Tuesday. No change in mentation, no change in vision, not experienced any seizures, no new weakness or numbness. No h/o tobacco use.  Not been to a doctor, or taken medication for HTN for 6 months after her doctor moved. Overnight prior to arrival she was feeling SOB with abnormal breath sounds. Went to urgent care. .  Advised to go to ED. Mom  of heart attack in 52's.  Dad heart attach 80, and he had a stroke in 's.       lvf   improved   bp control improved    Hydralazine/adalat   lopressor   aldactone   lisinopril   cr nl      pneumonia   on levaquin                Significant therapeutic interventions:     Significant Diagnostic Studies:   Labs / Micro:  CBC:   Lab Results   Component Value Date    WBC 7.1 04/15/2019    RBC 4.27 04/15/2019    HGB 12.9 04/15/2019    HCT 38.2 04/15/2019    MCV 89.3 04/15/2019    MCH 30.3 04/15/2019    MCHC 33.9 04/15/2019    RDW 15.3 04/15/2019     04/15/2019     BMP:    Lab Results   Component Value Date    GLUCOSE 134 04/15/2019     04/15/2019    K 4.0 04/15/2019    CL 97 04/15/2019    CO2 27 04/15/2019    ANIONGAP 11 04/15/2019    BUN 16 04/15/2019    CREATININE 0.75 04/15/2019    BUNCRER NOT REPORTED 04/15/2019    CALCIUM 8.8 04/15/2019    LABGLOM >60 04/15/2019    GFRAA >60 04/15/2019    GFR      04/15/2019    GFR NOT REPORTED 04/15/2019             Radiology:    Xr Chest Standard (2 Vw)    Result Date: 4/13/2019  EXAMINATION: TWO VIEWS OF THE CHEST, 4/13/2019 10:59 am COMPARISON: None HISTORY: ORDERING SYSTEM PROVIDED HISTORY: Elevated BP, SOB, cough. TECHNOLOGIST PROVIDED HISTORY: Elevated BP, SOB, cough. Ordering Physician Provided Reason for Exam: Elevated BP, SOB and cough. Acuity: Acute Type of Exam: Initial Additional signs and symptoms: Elevated BP, SOB and cough. FINDINGS: Evidence of COPD with hyperinflation. A right lower lobe lung infiltrate is noted likely representing pneumonia. Congestion but no evidence of pulmonary edema. No evidence of acute pleural disease. Right lower lobe infiltrate likely represents pneumonia. Underlying COPD. Xr Chest Portable    Result Date: 4/14/2019  EXAMINATION: SINGLE XRAY VIEW OF THE CHEST 4/14/2019 6:10 am COMPARISON: Two views of the chest 04/13/2019 HISTORY: ORDERING SYSTEM PROVIDED HISTORY: RLL PNA TECHNOLOGIST PROVIDED HISTORY: RLL PNA Ordering Physician Provided Reason for Exam: F/u RLL pneumonia Acuity: Acute Type of Exam: Initial Additional signs and symptoms: F/u RLL pneumonia FINDINGS: Heart size and mediastinal contour similar to previous exam given portable technique. Left lung is clear. Near complete resolution of right infrahilar opacity; mild residual streaky opacity is noted. The costophrenic angles are preserved. Small portion of the lung apices excluded from the field of view. Substantially improved right basilar aeration.          Consultations: Consults:     Final Specialist Recommendations/Findings:   IP CONSULT TO INTERNAL MEDICINE  IP CONSULT TO CARDIOLOGY      The patient was seen and examined on day of discharge and this discharge summary is in conjunction with any daily progress note from day of discharge. Discharge plan:     Disposition: Home    Physician Follow Up:     Artemus Epley,  [address] [facility]  [phone]    On 4/30/2019  NEW  PCP appt at 9:00a.m. If you are unable to keep appt,please call within 24 hours prior to appt to reschedule, bring photo ID, insurance card and list of Medications with you. Merari Never        Requiring Further Evaluation/Follow Up POST HOSPITALIZATION/Incidental Findings:     Diet: cardiac diet    Activity: As tolerated    Instructions to Patient:     Discharge Medications:      Medication List      START taking these medications    hydrALAZINE 50 MG tablet  Commonly known as:  APRESOLINE  Take 1 tablet by mouth every 8 hours     levofloxacin 500 MG tablet  Commonly known as:  LEVAQUIN  Take 1 tablet by mouth daily for 10 days  Start taking on:  4/16/2019     lisinopril 20 MG tablet  Commonly known as:  PRINIVIL;ZESTRIL  Take 1 tablet by mouth daily  Start taking on:  4/16/2019     metoprolol tartrate 50 MG tablet  Commonly known as:  LOPRESSOR  Take 1 tablet by mouth 2 times daily     NIFEdipine 30 MG extended release tablet  Commonly known as:  ADALAT CC  Take 1 tablet by mouth daily  Start taking on:  4/16/2019     spironolactone 50 MG tablet  Commonly known as:  ALDACTONE  Take 1 tablet by mouth daily  Start taking on:  4/16/2019        CONTINUE taking these medications    diphenhydrAMINE 25 MG tablet  Commonly known as:  BENADRYL     guaiFENesin 600 MG extended release tablet  Commonly known as:  MUCINEX        STOP taking these medications    furosemide 20 MG tablet  Commonly known as:  LASIX     ibuprofen 800 MG tablet  Commonly known as:  ADVIL;MOTRIN     MICARDIS HCT 80-25 MG per tablet  Generic drug:  telmisartan-hydrochlorothiazide     terbinafine 250 MG tablet  Commonly known as:  LAMISIL     Verapamil HCl  MG Cp24           Where to Get Your Medications      These medications were sent to [address] [facility] [address] [address] [address]    Phone:  [phone]   · hydrALAZINE 50 MG tablet  · levofloxacin 500 MG tablet  · lisinopril 20 MG tablet  · metoprolol tartrate 50 MG tablet  · NIFEdipine 30 MG extended release tablet  · spironolactone 50 MG tablet         Time Spent on discharge is 30 to 74 minutes in patient examination, evaluation, counseling as well as medication reconciliation, prescriptions for required medications, discharge plan and follow up. Electronically signed by   Kiersten Rosa MD  4/15/2019  3:05 PM      Thank you Dr. Madison Russo primary care provider on file. for the opportunity to be involved in this patient's care.

## 2019-04-15 NOTE — PROGRESS NOTES
[address]   Occupational Therapy Evaluation  Date: 4/15/19  Patient Name: Rufus Collins       Room: -01  MRN: [id number]  Account: [de-identified]   : 1945  (70 y.o.) Gender: female     Discharge Recommendations:  Home with assist PRN  Equipment Needed: No       Diagnosis: pneumonia          Past Medical History:  has a past medical history of Arthritis, Hypertension, and Poor circulation. Past Surgical History:   has a past surgical history that includes Tooth Extraction and Skin tag removal.    Restrictions  Restrictions/Precautions: Fall Risk  Required Braces or Orthoses?: No     Vitals  Temp: 98.3 °F (36.8 °C)  Pulse: 89  Resp: 18  BP: (!) 162/58  Height: 5' 8\" (172.7 cm)  Weight: (!) 321 lb 3.4 oz (145.7 kg)  BMI (Calculated): 48.9  Oxygen Therapy  SpO2: 96 %  O2 Device: None (Room air)  Level of Consciousness: Alert    Subjective  Subjective: \" I was doing leg lifts here in my bed. \"   pt notes when too ill for mobility, she self inititated ex  Comments: \"This is the first time I've been in the hospital , as a patient. \"     Vision  Vision: Impaired  Vision Exceptions: Wears glasses at all times  Hearing  Hearing: Within functional limits  Social/Functional History  Lives With: Alone  Type of Home: House  Home Layout: One level  Home Access: Stairs to enter with rails  Entrance Stairs - Number of Steps: 4  Entrance Stairs - Rails: Both  Bathroom Shower/Tub: Shower chair with back  [employer]: Grab bars in [address]: Rolling walker, Cane, Electric scooter, Grab bars  Receives Help From: Family  ADL Assistance: Independent  Homemaking Assistance: Independent  Homemaking Responsibilities: Yes  Ambulation Assistance: Independent(home furniture walks, community walker or scooter)  Transfer Assistance: Independent  Active : Yes(pt notes her scooter can lift itself into [facility])  Occupation: Retired  IADL Comments: has \"cleaning lady\" who comes every 8 days, has brother, sis in law and 2 great nieces who will assist as needed, pt was indep don teds herself  Additional Comments: pt orders food online, drives to store and it is brought out to her, if she needs help to carry into house she calls neighbor, has main floor laundry       Objective      Cognition  Overall Cognitive Status: WFL       ADL  Feeding: Independent  Grooming: Setup  UE Bathing: Setup  LE Bathing: Setup  UE Dressing: Setup  LE Dressing: Setup  Toileting: Stand by assistance  Additional Comments: pt showered seated on tub seat in walk in shower today, pt needed A with transfer only per pt report (Pt fatigued post shower and needing min assist with stand, ambulate due to unsteady)  pt has been indep to bathroom here    UE Function           LUE Strength  Gross LUE Strength: WFL           LUE AROM (degrees)  LUE AROM : WFL        RUE Strength  Gross RUE Strength: WFL            RUE AROM (degrees)  RUE AROM : WFL          Fine Motor Skills  Coordination  Movements Are Fluid And Coordinated: Yes                           Mobility  Supine to Sit: Independent  Sit to Supine: Independent       Balance  Sitting Balance: Independent  Standing Balance: Independent  Standing Balance  Time: 3-5 x 2  Activity: static stand without UE support  Sit to stand: Independent  Stand to sit:  Independent  Comment: pt with LOB with dynamic stand, ambulate today requiring min A to recover balance, ed re fall risk now and instructed pt to obtain assist to ambulate, nsg was notified, (Pt fatigued post shower and needing more assist due to unsteady)  pt has been indep to bathroom here  Functional Mobility  Functional - Mobility Device: Cane  Assist Level: Minimal assistance  Functional Mobility Comments: amb 10 feet from 1 side of bed to another  Bed mobility  Rolling to Right: Independent  Supine to Sit: Independent  Sit to Supine: Independent  Scooting: Independent     Transfers  Stand Step Transfers: Minimal assistance  Sit to stand: Independent  Stand to sit:  Independent      Assessment  Performance deficits / Impairments: Decreased functional mobility , Decreased balance, Decreased ADL status, Decreased endurance, Decreased high-level IADLs  Prognosis: Good  Decision Making: Low Complexity  History: pneumonia  Exam: 5 performance deficits  Assistance / Modification: low  Patient Education: pt notes she plans to have assist with vacuming and laundry, post ed agreed to have someone drive to p/u groceries until stronger, ed re circ aide vs easy slide to assist with compression hose, ed re fall risk now and instructed pt to obtain assist to ambulate, nsg was notified, (Pt fatigued post shower and needing more assist due to unsteady)  pt has been indep to bathroom here  REQUIRES OT FOLLOW UP: Yes  Discharge Recommendations: Home with assist PRN  Activity Tolerance: Patient Tolerated treatment well, Patient limited by fatigue  Activity Tolerance: pt on room air                   Goals  Patient Goals   Patient goals : return home to prior indep  Short term goals  Time Frame for Short term goals: 1 week  Short term goal 1: pt to jenny 6-8 min stand, amb for adls , ex, indep with good balance  Short term goal 2: indep amb to obtain set up for adls with high, low reaching  Short term goal 3: indep self care and adl transfers  Short term goal 4: jenny 10 reps x 6 UE AROM with min resistance    Plan        Plan  Times per week: 4-6  Times per day: Daily  Current Treatment Recommendations: Endurance Training, Self-Care / ADL, Home Management Training, Functional Mobility Training, Balance Training, Patient/Caregiver Education & Training    OT Equipment Recommendations  Equipment Needed: No  OT Individual Minutes  Time In: 3725  Time Out: 1105  Minutes: 37    Electronically signed by Damari Neville OT on 4/15/19 at 3:57 PM

## 2019-04-15 NOTE — PLAN OF CARE
Problem: Falls - Risk of:  Goal: Will remain free from falls  Description  Will remain free from falls  4/15/2019 0406 by Tanya Reddy RN  Outcome: Ongoing   The patient remained free from falls this shift, call light within reach, bed in locked and lowest position. Side rails up x2. Continue to monitor closely. Problem: Airway Clearance - Ineffective:  Goal: Clear lung sounds  Description  Clear lung sounds  Outcome: Ongoing   Rhonchi still present. Oxygen saturation remains in the mid to high 90's on Ra.  No complaints of SOB throughout this shift

## 2019-04-16 NOTE — CARE COORDINATION
[facility] [facility] Transitions Initial Follow Up Call    Call within 2 business days of discharge: Yes    Patient: Delvis Wong Patient : 1945   MRN: [id number]  Reason for Admission: pneumonia  Discharge Date: 4/15/19 RARS: Readmission Risk Score: 10      Last Discharge [address] [id number]       Complaint Diagnosis Description Type Department Provider    19 Cough Pneumonia due to organism . .. ED to Hosp-Admission (Discharged) (ADMITTED) KELSEY Mo MD; Courtney Greene... Spoke with: [address]: [address]    Non-face-to-face services provided:  Scheduled appointment with PCP-TCM visit 19 with Dr. Tomasa Chiu and reviewed discharge summary and/or continuity of care documents  Assessment and support for treatment adherence and medication management-reviewed discharge instructions and medications, 1111F order   Writer contacted patient, reviewed discharge instructions and medications, 1111F order completed, patient has a f/u appointment with Dr. Esther Orourke on 19, Adriel Denise stated she was still feeling tired and weak but her brother is helping her out.  Reviewed BPCI-A medicare benefit, reminded patient that she would received calls periodically over the next 90 days, patient v/u, confirmed patient has writer's contact information//TORRI    Care Transitions 24 Hour Call    Do you have any ongoing symptoms?:  Yes  Patient-reported symptoms:  Fatigue, Weakness  Do you have a copy of your discharge instructions?:  Yes  Do you have all of your prescriptions and are they filled?:  Yes  Have you scheduled your follow up appointment?:  Yes  How are you going to get to your appointment?:  Car - family or friend to transport  Were you discharged with any Home Care or Post Acute Services:  No  Do you feel like you have everything you need to keep you well at home?:  Yes  Care Transitions Interventions         Follow Up  Future Appointments   Date Time Provider Milind Lucia   2019

## 2019-04-16 NOTE — CARE COORDINATION
This patient has been assigned to the [facility] for Care Improvement  (BPCI) Initiative. Discharged from [facility] on:  4/15/19  Diagnosis:  Pneumonia assigned DR W Ricco Fontaine RN, JENNIFER will be following for 90 days post discharge.   End Date: 19

## 2019-04-16 NOTE — CARE COORDINATION
BPCI-A Medical Bundle Patient. Working DR  Admitting Location: [address] [place] [address] Date: 19  Date of Diarge: 4/15/19    End Date: 19    90-day post-acute outreach & tracking with qualifying DRG.

## 2019-04-27 NOTE — CARE COORDINATION
[address] for Care Improvement ([facility]) Follow Up Call  Qualifying Diagnosis of Pneumonia. 4/27/2019  Patient Name:  George Herrera   YOB: 1945  Discharge Date:  4/15/19  RARS:  Readmission Risk Score: 10    PCP:  No primary care provider on file. Assessment:      Short of Breath: denies   Cough Frequency, Productive/Color: occasional cough minimal, clear sputum.  Appetite: ok   Sleep pattern: good   Thinking clearly: yes   Tired/weakness: ok   Fever, Chills Sweating: denies   Headaches: denies  [address],  Active: denies need   Medication Needs/Questions: denies   Transportation Need:  Brother drives her   Ability to [employer], [place]: ok    Do you feel like you have everything you need to stay well at home? yes   Follow Up Appointment: scheduled   Teaching:  Call physician's office with any needs, concerns.      Follow Up Concerns:    Future Appointments   Date Time Provider Milind Lucia   4/30/2019  9:00 AM Dennise Lundborg,  [address] [place] will continue to follow per [facility].  Kendra Rousseau RN, CTC

## 2019-04-30 NOTE — PROGRESS NOTES
Subjective:      Chief Complaint   Patient presents with    Established New Doctor    Follow-Up from Hospital     F/U from [address] since being home from the hospital her vision has been a little unclear and she is weak        Patient ID: Rosie Lindsay is a 75 y.o. female. Visit Information    Have you changed or started any medications since your last visit including any over-the-counter medicines, vitamins, or herbal medicines? no   Are you having any side effects from any of your medications? -  no  Have you stopped taking any of your medications? Is so, why? -  no    Have you seen any other physician or provider since your last visit? No  Have you had any other diagnostic tests since your last visit? Yes - Records Obtained  Have you been seen in the emergency room and/or had an admission to a hospital since we last saw you? Yes - Records Obtained  Have you had your routine dental cleaning in the past 6 months? no    Have you activated your [employer] account? If not, what are your barriers?  No:      Patient Care Team:  Erika Hart RN as Care Transition    Medical History Review  Past Medical, Family, and Social History reviewed and does not contribute to the patient presenting condition    Health Maintenance   Topic Date Due    DTaP/Tdap/Td vaccine (1 - Tdap) 05/12/1963    Lipid screen  05/12/1984    Breast cancer screen  05/12/1994    Shingles Vaccine (1 of 2) 05/12/1994    Colon cancer screen colonoscopy  05/12/1994    DEXA (modify frequency per FRAX score)  05/12/2009    Flu vaccine (Season Ended) 09/01/2019    Pneumococcal 65+ years Vaccine (2 of 2 - PPSV23) 04/15/2020    Potassium monitoring  04/15/2020    Creatinine monitoring  04/15/2020     HPI    Review of Systems    Objective:   Physical Exam    Assessment / Plan:

## 2019-04-30 NOTE — PATIENT INSTRUCTIONS
Patient Education        DASH Diet: Care Instructions  Your Care Instructions    The DASH diet is an eating plan that can help lower your blood pressure. DASH stands for Dietary Approaches to Stop Hypertension. Hypertension is high blood pressure. The DASH diet focuses on eating foods that are high in calcium, potassium, and magnesium. These nutrients can lower blood pressure. The foods that are highest in these nutrients are fruits, vegetables, low-fat dairy products, nuts, seeds, and legumes. But taking calcium, potassium, and magnesium supplements instead of eating foods that are high in those nutrients does not have the same effect. The DASH diet also includes whole grains, fish, and poultry. The DASH diet is one of several lifestyle changes your doctor may recommend to lower your high blood pressure. Your doctor may also want you to decrease the amount of sodium in your diet. Lowering sodium while following the DASH diet can lower blood pressure even further than just the DASH diet alone. Follow-up care is a key part of your treatment and safety. Be sure to make and go to all appointments, and call your doctor if you are having problems. It's also a good idea to know your test results and keep a list of the medicines you take. How can you care for yourself at home? Following the DASH diet  · Eat 4 to 5 servings of fruit each day. A serving is 1 medium-sized piece of fruit, ½ cup chopped or canned fruit, 1/4 cup dried fruit, or 4 ounces (½ cup) of fruit juice. Choose fruit more often than fruit juice. · Eat 4 to 5 servings of vegetables each day. A serving is 1 cup of lettuce or raw leafy vegetables, ½ cup of chopped or cooked vegetables, or 4 ounces (½ cup) of vegetable juice. Choose vegetables more often than vegetable juice. · Get 2 to 3 servings of low-fat and fat-free dairy each day. A serving is 8 ounces of milk, 1 cup of yogurt, or 1 ½ ounces of cheese. · Eat 6 to 8 servings of grains each day. A serving is 1 slice of bread, 1 ounce of dry cereal, or ½ cup of cooked rice, pasta, or cooked cereal. Try to choose whole-grain products as much as possible. · Limit lean meat, poultry, and fish to 2 servings each day. A serving is 3 ounces, about the size of a deck of cards. · Eat 4 to 5 servings of nuts, seeds, and legumes (cooked dried beans, lentils, and split peas) each week. A serving is 1/3 cup of nuts, 2 tablespoons of seeds, or ½ cup of cooked beans or peas. · Limit fats and oils to 2 to 3 servings each day. A serving is 1 teaspoon of vegetable oil or 2 tablespoons of salad dressing. · Limit sweets and added sugars to 5 servings or less a week. A serving is 1 tablespoon jelly or jam, ½ cup sorbet, or 1 cup of lemonade. · Eat less than 2,300 milligrams (mg) of sodium a day. If you limit your sodium to 1,500 mg a day, you can lower your blood pressure even more. Tips for success  · Start small. Do not try to make dramatic changes to your diet all at once. You might feel that you are missing out on your favorite foods and then be more likely to not follow the plan. Make small changes, and stick with them. Once those changes become habit, add a few more changes. · Try some of the following:  ? Make it a goal to eat a fruit or vegetable at every meal and at snacks. This will make it easy to get the recommended amount of fruits and vegetables each day. ? Try yogurt topped with fruit and nuts for a snack or healthy dessert. ? Add lettuce, tomato, cucumber, and onion to sandwiches. ? Combine a ready-made pizza crust with low-fat mozzarella cheese and lots of vegetable toppings. Try using tomatoes, squash, spinach, broccoli, carrots, cauliflower, and onions. ? Have a variety of cut-up vegetables with a low-fat dip as an appetizer instead of chips and dip. ? Sprinkle sunflower seeds or chopped almonds over salads. Or try adding chopped walnuts or almonds to cooked vegetables.   ? Try some vegetarian meals using beans and peas. Add garbanzo or kidney beans to salads. Make burritos and tacos with mashed matson beans or black beans. Where can you learn more? Go to https://chpemelvineweb.LRN. org and sign in to your [employer] account. Enter J284 in the [employer] box to learn more about \"DASH Diet: Care Instructions. \"     If you do not have an account, please click on the \"Sign Up Now\" link. Current as of: July 22, 2018  Content Version: 11.9  © 8956-9403 [employer]. Care instructions adapted under license by [employer] ([facility]). If you have questions about a medical condition or this instruction, always ask your healthcare professional. Wadie Pean any warranty or liability for your use of this information. Patient Education         High Blood Pressure: The DASH Diet (02:03)  Your health professional recommends that you watch this short online health video. Learn how the DASH eating plan can help lower your blood pressure. How to watch the video    Scan the QR code   OR Visit the website    https://hwi. /r/Kd9tvg743xfub   Current as of: July 22, 2018  Content Version: 11.9  © 2006-2018 [employer]. Care instructions adapted under license by [employer] ([facility]). If you have questions about a medical condition or this instruction, always ask your healthcare professional. Wadie Pean any warranty or liability for your use of this information.

## 2019-04-30 NOTE — PROGRESS NOTES
lisinopril (PRINIVIL;ZESTRIL) 20 MG tablet Take 1 tablet by mouth daily 30 tablet 3    metoprolol tartrate (LOPRESSOR) 50 MG tablet Take 1 tablet by mouth 2 times daily 60 tablet 3    NIFEdipine (ADALAT CC) 30 MG extended release tablet Take 1 tablet by mouth daily 30 tablet 3    spironolactone (ALDACTONE) 50 MG tablet Take 1 tablet by mouth daily 30 tablet 3        Medications patient taking as of now reconciled against medications ordered at time of hospital discharge: Yes    Chief Complaint   Patient presents with    Established New Doctor    Follow-Up from Hospital     F/U from [address] since being home from the hospital her vision has been a little unclear and she is weak        HPI- Patient is here for Transitional Care , [place] [facility] , Found to have Right Sided Pneumonia , she was not taking her Medication, she feels that her Pneumonia is Improved  she does not have chest Pain, SOB ., noticed Vision difficulties in Both eye , Her BP is Controlled , she was found to have subclinical Hypothyroidism . She does not have Symptoms of Sleep apnea , does not have Bed partner for Long time . Inpatient course: Discharge summary reviewed- see chart. Interval history/Current status: Improved     Review of Systems   Constitutional: Negative for activity change, appetite change, chills, diaphoresis, fatigue and fever. HENT: Negative for congestion, dental problem, drooling and ear discharge. Eyes: Positive for visual disturbance (in both eyes ). Negative for pain, discharge, redness and itching. Respiratory: Negative for apnea, cough, choking, chest tightness and shortness of breath. Cardiovascular: Negative for chest pain and leg swelling. Gastrointestinal: Negative for abdominal distention, abdominal pain, blood in stool, constipation and diarrhea. Endocrine: Negative for cold intolerance and heat intolerance. Genitourinary: Negative for difficulty urinating, dysuria, enuresis, flank pain and frequency. Musculoskeletal: Negative for arthralgias, back pain, gait problem and joint swelling. Skin: Negative for color change, pallor and rash. Tags around Neck    Neurological: Negative for dizziness, facial asymmetry, light-headedness, numbness and headaches. Psychiatric/Behavioral: Negative for agitation, behavioral problems, confusion, decreased concentration and dysphoric mood. Vitals:    04/30/19 0909   BP: 136/84   Pulse: 93   SpO2: 93%   Weight: (!) 306 lb (138.8 kg)   Height: 5' 7.99\" (1.727 m)     Body mass index is 46.54 kg/m². Wt Readings from Last 3 Encounters:   04/30/19 (!) 306 lb (138.8 kg)   04/14/19 (!) 321 lb 3.4 oz (145.7 kg)   11/13/14 290 lb (131.5 kg)     BP Readings from Last 3 Encounters:   04/30/19 136/84   04/15/19 (!) 162/58   11/13/14 154/78       Physical Exam   Constitutional: She is oriented to person, place, and time. She appears well-developed. Central obesity Present    HENT:   Head: Normocephalic and atraumatic. Mouth/Throat: No oropharyngeal exudate. Eyes: Pupils are equal, round, and reactive to light. Conjunctivae are normal. Right eye exhibits no discharge. Left eye exhibits no discharge. No scleral icterus. Neck: Normal range of motion. Neck supple. No JVD present. No tracheal deviation present. No thyromegaly present. Cardiovascular: Normal rate and normal heart sounds. Exam reveals no gallop. No murmur heard. Pulmonary/Chest: Effort normal and breath sounds normal. No stridor. No respiratory distress. She has no wheezes. She has no rales. She exhibits no tenderness. Abdominal: Soft. Bowel sounds are normal. She exhibits no distension. There is no tenderness. There is no rebound and no guarding. Musculoskeletal: Normal range of motion. She exhibits no edema. Neurological: She is alert and oriented to person, place, and time.    Skin: She is not diaphoretic. Multiple skin tags around neck area          Assessment and Plan   1. Essential hypertension  Controlled     2. Subclinical hypothyroidism    - Thyroid Peroxidase Antibody; Future    3. Morbid obesity ([address]  Advise to loose weight     4. Health care maintenance  - Lipid Panel; Future    5. Resistant hypertension  - 73017 - NM Duplex Abd/Pel Vasc Study, Complete  - Aldosterone; Future  - Renin; Future    6. Breast cancer screening  - SAMAN DIGITAL SCREEN W CAD BILATERAL; Future    7. Colon cancer screening  - POCT Fecal Immunochemical Test (FIT); Future    8. Skin tags, multiple acquired  - Leila Orourke MD, Dermatology, [place]      Return in about 6 weeks (around 6/11/2019). · Reviewed prior labs and health maintenance. · Discussed use, benefit, and side effects of prescribed medications. Barriers to medication compliance addressed. All patient questions answered. Pt voiced understanding. Jeni Gonzalez MD  [facility]  4/30/2019, 9:44 AM    Please note that this chart was generated using voice recognition Dragon dictation software. Although every effort was made to ensure the accuracy of this automated transcription, some errors in transcription may have occurred.

## 2019-05-11 NOTE — CARE COORDINATION
[address] [facility] for Care Improvement ([facility]) Follow Up Call  Qualifying Diagnosis of Pneumonia. 5/11/2019  Patient Name:  Luci Edwards   YOB: 1945  Discharge Date:  4/15/19  RARS:  Readmission Risk Score: 10    PCP:  Garcia Sutherland MD    Assessment:     Jacquelynn Gilford of Breath: denies   Cough Frequency, Productive/Color: denies, dry throat using lozenges    Appetite: good   Sleep pattern: ok   Thinking clearly: yes   Tired/weakness: \"Just being lazy and resting\"   Fever, Chills Sweating: denies   Headaches: denies, reports \"fuzzy vision\"  Has an optometrist appointment scheduled. [address],  Active: N/A   Medication Needs/Questions: denies   Transportation Need:  drives   Ability to Prepare Meals, Bathe: \"fine\"   Do you feel like you have everything you need to stay well at home? yes   Follow Up Appointment: completed   Teaching:  Call physician's office with any needs, concerns. Agreeable to continued communication per [facility] program 90 day follow up.     Follow Up Concerns:    Future Appointments   Date Time Provider Milind Lucia   6/3/2019  9:30 AM Agustín Fraga MD [id number]   6/11/2019  8:30 AM Garcia Sutherland MD [facility] Transitions will continue to follow per [facility] Program.  Baldo Parekh RN, [facility]

## 2019-05-16 NOTE — TELEPHONE ENCOUNTER
Patient called requesting Nadja Springer Excuse 5/21, vision is not good and HTN so she is unable to drive a Vehicle. Please advise

## 2019-05-16 NOTE — LETTER
[facility]  [address] [place] [address]  Phone: [phone]  Fax: [phone]    Amira Schwab MD        May 17, 2019     Patient: Delgado Gilmore   YOB: 1945   Date of Visit: 5/16/2019       To Whom It May Concern: It is my medical opinion that Doris Rhodes is unable to perform jury duty at this time. If you have any questions or concerns, please don't hesitate to call.     Sincerely,        Amira Schwab MD

## 2019-06-03 NOTE — PROGRESS NOTES
Constitutional: Denies fevers, chills, and malaise. PHYSICAL EXAM:   BP (!) 164/78   Pulse 75   Ht 5' 7\" (1.702 m)   Wt 294 lb (133.4 kg)   SpO2 95%   BMI 46.05 kg/m²     General Exam:  General Appearance: No acute distress, Well nourished     Neuro: Alert and oriented to person, place and time  Psych: Normal affect   Lymph Node: Not performed    Cutaneous Exam: Performed as documented in clinic note below. Sun-exposed skin, which includes the head/face, neck, both arms, digits and/or nails was examined. Pertinent Physical Exam Findings:  Physical Exam  Multiple crusted tan to brown stuck-on papules and pedunculated papules on neck and chest, few on visibly irritated    Photo surveillance performed: No    Medical Necessity of Exam Performed:   Distribution of patient concerns    Additional Diagnostic Testing performed during exam: Not performed ,  Not performed    ASSESSMENT:   Diagnosis Orders   1. Inflamed acrochordon  lidocaine-EPINEPHrine 1 percent-1:337652 injection 0.5 mL    WV REMOVAL OF SKIN TAGS, UP TO 15       Plan of Action is as Follows:  Assessment   1. Inflamed acrochordon  After cleaning with alcohol and anesthetizing with lidocaine 1% with epinephrine, 3 irritated and inflamed skin tag in the right and left neck was removed with iris scissors. Hemostasis was achieved with pressure. Vaseline and a bandage were applied. The patient was counseled that malignancy cannot be completely ruled out without pathologic diagnosis.  However, given benign and typical appearance of his/her acrochorda, a joint decision between patient and physician was made to proceed with skin tag removal and defer sending the specimens for histopathologic examination.  - lidocaine-EPINEPHrine 1 percent-1:096742 injection 0.5 mL  - [place] REMOVAL OF SKIN TAGS, UP TO 15    RTC prn            Patient Instructions   BIOPSY WOUND CARE    A biopsy is where a small piece of skin tissue is removed and examined by a pathologist. When a biopsy is done, there is a small wound site that requires proper care to prevent infection and scarring. Some biopsies require sutures and their removal.    How to Care for Biopsy Wound    A.  Leave band-aid or dressing on for 24 hours. B. Wash two times a day with soap and water. C.  Let the wound air dry, then apply Vaseline ointment and cover with a Band-Aid       unless otherwise instructed by your provider. D. If there is slight discomfort, you may give acetaminophen or ibuprofen. When To Call the Doctor    Call the Dermatology Clinic or your doctor if any of the following occur:    A. Redness and swelling  B. Tenderness and warm to touch  C.  Drainage from wound  D. Fever    Biopsy Results    Biopsy results are usually available in 1-2 weeks. We provide biopsy results in letters for begin results or we will call for any concerning results. If you have not heard from our staff please call the office within 2 weeks. Please call our office with any concerns at [phone]. Follow-up: No follow-ups on file. This note was created with the assistance of a speech-recognition program.  Although the intention is to generate a document that actually reflects the content of the visit, no guarantees can be provided that every mistake has been identified and corrected byediting.     Electronically signed by Henny Ochoa MD on 6/3/19 at 11:11 AM

## 2019-06-03 NOTE — PATIENT INSTRUCTIONS

## 2019-06-11 NOTE — CARE COORDINATION
Deborah Riggins [facility] Follow Up Call    2019    Patient: Macy Pinto  Patient : 1945   MRN: [id number]  Reason for Admission: 90-day post-acute tracking and outreach with qualifying DRG from date of discharge. BPCI-A Medical Bundle Patient. DRG: PNA (193)  Admitting Location: [address] [place] [address]  Adm Date: 19  Date of Discharge: 4/15/19  End Date: 19  Discharge Date: 4/15/19 RARS: Readmission Risk Score: 10    Attempted to reach patient for BPCI-A outreach. Mailbox was full and unable to leave voice mail or contact information.           Follow Up  Future Appointments   Date Time Provider Milind Lucia   2019  9:45 AM Elva Stoddard MD [address] Jocelyn Priest RN

## 2019-06-12 PROBLEM — E66.01 MORBID OBESITY (HCC): Status: ACTIVE | Noted: 2019-01-01

## 2019-06-12 NOTE — PROGRESS NOTES
Subjective:      Chief Complaint   Patient presents with    Results     Review labs     Cough     Pt states she has a dry cough, pt states she has this cough more in the morning when she wakes up and her mouth is extra dry        Patient ID: Judith Ordonez is a 75 y.o. female. Visit Information    Have you changed or started any medications since your last visit including any over-the-counter medicines, vitamins, or herbal medicines? no   Are you having any side effects from any of your medications? -  no  Have you stopped taking any of your medications? Is so, why? -  no    Have you seen any other physician or provider since your last visit? Yes - Records Obtained  Have you had any other diagnostic tests since your last visit? No  Have you been seen in the emergency room and/or had an admission to a hospital since we last saw you? No  Have you had your routine dental cleaning in the past 6 months? no    Have you activated your [employer] account? If not, what are your barriers?  Yes     Patient Care Team:  Justa Estes MD as PCP - General (Internal Medicine)  Justa Estes MD as PCP - [facility]  Samaria Mack RN as Care Transition    Medical History Review  Past Medical, Family, and Social History reviewed and does not contribute to the patient presenting condition    Health Maintenance   Topic Date Due    DTaP/Tdap/Td vaccine (1 - Tdap) 05/12/1963    Shingles Vaccine (1 of 2) 05/12/1994    Colon cancer screen colonoscopy  05/12/1994    DEXA (modify frequency per FRAX score)  05/12/2009    Flu vaccine (Season Ended) 09/01/2019    Pneumococcal 65+ years Vaccine (2 of 2 - PPSV23) 04/15/2020    Potassium monitoring  04/15/2020    Creatinine monitoring  04/15/2020    Lipid screen  05/30/2024     HPI      HPI   1) Location/Symptom Dry cough   2) Timing/Onset: 1 month   3) Context/Setting: gradually worsening   4) Quality:   5) Duration: continuous   6) Modifying Factors: starting lisinopril 7) Severity: moderate   She has Hashimoto thyroiditis  Her BP is controlled , lost her BP machine , Watch her Diet , low salt diet . Compliant with her medication   Did not get Renal Doppler US for Renal Artery stenosis with H/o Resistant HTN   Working on loosing weight     Review of Systems   Constitutional: Negative for activity change, appetite change, chills, diaphoresis, fatigue and fever. HENT: Negative for congestion, dental problem, drooling and ear discharge. Eyes: Positive for visual disturbance (both eyes , uses glasses ). Negative for pain, discharge, redness and itching. Respiratory: Positive for cough. Negative for apnea, choking, chest tightness and shortness of breath. Cardiovascular: Negative for chest pain and leg swelling. Gastrointestinal: Positive for constipation. Negative for abdominal distention, abdominal pain, blood in stool and diarrhea. Endocrine: Negative for cold intolerance and heat intolerance. Genitourinary: Negative for difficulty urinating, dysuria, enuresis, flank pain and frequency. Musculoskeletal: Negative for arthralgias, back pain, gait problem and joint swelling. Skin: Negative for color change, pallor and rash. Skin tags around neck area    Neurological: Negative for dizziness, facial asymmetry, light-headedness, numbness and headaches. Psychiatric/Behavioral: Negative for agitation, behavioral problems, confusion, decreased concentration and dysphoric mood. Objective:   Physical Exam   Constitutional: She is oriented to person, place, and time. She appears well-developed. Central obesity presedent    HENT:   Head: Normocephalic and atraumatic. Mouth/Throat: No oropharyngeal exudate. Eyes: Pupils are equal, round, and reactive to light. Conjunctivae are normal. Right eye exhibits no discharge. Left eye exhibits no discharge. No scleral icterus. Neck: Normal range of motion. Neck supple. No JVD present.  No tracheal deviation present. No thyromegaly present. Cardiovascular: Normal rate and normal heart sounds. Exam reveals no gallop. No murmur heard. Pulmonary/Chest: Effort normal and breath sounds normal. No stridor. No respiratory distress. She has no wheezes. She has no rales. She exhibits no tenderness. Abdominal: Soft. Bowel sounds are normal. She exhibits no distension. There is no tenderness. There is no rebound and no guarding. Musculoskeletal: Normal range of motion. She exhibits no edema. Neurological: She is alert and oriented to person, place, and time. Skin: She is not diaphoretic. Assessment / Plan:   1. Essential hypertension  Controlled   Will DC Lisinopril with Cough   - losartan (COZAAR) 50 MG tablet; Take 1 tablet by mouth daily  Dispense: 90 tablet; Refill: 1  - Basic Metabolic Panel; Future   Reordering Arterial Duplex scan   2. Subclinical hypothyroidism    - TSH; Future    3. Hashimoto's thyroiditis  Has High TPO antibodies     4. Morbid obesity ([address]  Loosing weight     5. Constipation, unspecified constipation type  - docusate sodium (COLACE) 100 MG capsule; Take 1 capsule by mouth 2 times daily  Dispense: 60 capsule; Refill: 0    6. Breast cancer screenin  - SAMAN DIGITAL SCREEN W CAD BILATERAL; Future        · Return in about 3 months (around 9/12/2019). · Reviewed prior labs and health maintenance. · Discussed use, benefit, and side effects of prescribed medications. Barriers to medication compliance addressed. All patient questions answered. Pt voiced understanding. Young Duane, MD [facility]  6/12/2019, 11:08 AM    Please note that this chart was generated using voice recognition Dragon dictation software. Although every effort was made to ensure the accuracy of this automated transcription, some errors in transcription may have occurred.

## 2019-06-21 NOTE — CARE COORDINATION
[address] [facility] for Care Improvement ([facility]) Follow Up Call  Qualifying Diagnosis of Pneumonia. 6/21/2019  Patient Name:  Itz Robin   YOB: 1945  Discharge Date:  4/15/19  RARS:  Readmission Risk Score: 10    PCP:  Ayse Engle MD    Assessment:     Susannah Green of Breath: denies   Cough Frequency, Productive/Color: occasional, dry cough   Appetite: good   Sleep pattern: good   Thinking clearly: yes   Tired/weakness: occasional   Fever, Chills Sweating: denies   Headaches: denies  [address],  Active: denies need   Medication Needs/Questions: denies   Transportation Need: family assists     Live Alone: yes   Ability to Prepare Meals, Bathe: yes   Do you feel like you have everything you need to stay well at home? yes   Follow Up Appointment: completed   Teaching:  Call physician's office with any needs, concerns. Agreeable to continued communication per [facility] program 90 day follow up.     Follow Up Concerns:  cataract surgery \"soon\"    Future Appointments   Date Time Provider Milind Rea   9/12/2019  1:15 PM Ayse Engle  [address] will continue to follow per [facility] Program.  Bran Li, KP, [facility]

## 2019-09-02 PROBLEM — I50.43 CHF (CONGESTIVE HEART FAILURE), NYHA CLASS I, ACUTE ON CHRONIC, COMBINED (HCC): Status: ACTIVE | Noted: 2019-01-01

## 2019-09-02 NOTE — ED PROVIDER NOTES
[facility]  Emergency Department Encounter  EmergencyMedicine Resident     Pt Zoe Schaumann  MRN: [id number]  [place] 1945  Date of evaluation: 9/2/19  PCP:  Joe Viveros MD    [address] [place] [address]       Chief Complaint   Patient presents with    Extremity Weakness     Pt to ED per EMS with c/o weakness since this morning, pt states bent over to put her shoes on and felt dizzy, pt denies chest pain, no SOB, no n/v/d, pt was started on lasix a week ago for leg swelling       HISTORY OF PRESENT ILLNESS  (Location/Symptom, Timing/Onset, Context/Setting, Quality, Duration, Modifying Factors, Severity.)      Joe Rudd is a 75 y.o. female who presents with worsening shortness of breath with exertion and lightheadedness/weakness upon standing. Patient has a past history of hypertension, hypothyroidism. States she is taking all the medication as prescribed. She states that she recently was started on Lasix approximately 1 week prior. She denies any associated chest pain. She states that she is eating and drinking normally, having normal bowel and bladder habits. PAST MEDICAL / SURGICAL / SOCIAL / FAMILY HISTORY      has a past medical history of Arthritis, CHF (congestive heart failure), NYHA class I, acute on chronic, combined ([address] Hypertension, Hypothyroid, and Poor circulation. has a past surgical history that includes Tooth Extraction; Skin tag removal; and Eye surgery.     Social History     Socioeconomic History    Marital status: Single     Spouse name: Not on file    Number of children: Not on file    Years of education: Not on file    Highest education level: Not on file   Occupational History    Not on file   Social Needs    Financial resource strain: Not on file    Food insecurity:     Worry: Not on file     Inability: Not on file    Transportation needs:     Medical: Not on file     Non-medical: Not on file   Tobacco Use    Smoking status: Never Smoker    Smokeless air, started on nasal cannula. Cardiac work-up performed in the emergency department, high sensitivity troponins elevated, cardiology consulted, no additional recognitions at this time, patient was given aspirin. Cardiology will plan to evaluate. BNP elevated approximately double baseline. Blood counts grossly within normal limits. Electrolytes, kidney function within normal limits. EKG demonstrates nonspecific changes in V3, aVL. Chest x-ray unremarkable. Patient was discussed with and admitted to internal medicine for further work-up, symptoms appear consistent with worsening shortness of breath on exertion secondary to congestive heart failure with associated demand ischemia and elevated troponin. She remained stable throughout ED stay. PROCEDURES:  None    CONSULTS:  IP CONSULT TO CARDIOLOGY  IP CONSULT TO INTERNAL MEDICINE    CRITICAL CARE:  None    FINAL IMPRESSION      1. Congestive heart failure, unspecified HF chronicity, unspecified heart failure type [facility])          DISPOSITION / PLAN     DISPOSITION Admitted 09/02/2019 10:48:46 AM      PATIENT REFERRED TO:  Sindhu Wilson, [address] [place] [address]  [phone]            DISCHARGE MEDICATIONS:  Current Discharge Medication List          Ilya Lu D.O.   Emergency Medicine Resident    (Please note that portions ofthis note were completed with a voice recognition program.  Efforts were made to edit the dictations but occasionally words are mis-transcribed.)      Ilya Lu MD  09/02/19 [id number]

## 2019-09-02 NOTE — H&P
[address]     Department of Internal Medicine - Staff Internal Medicine Teaching Service          ADMISSION NOTE/HISTORY AND PHYSICAL EXAMINATION   Date: 9/2/2019  Patient Name: Raúl Saeed  Date of admission: 9/2/2019  8:32 AM  YOB: 1945  PCP: Zhanna Whitlock MD  History Obtained From:  patient    CHIEF COMPLAINT     Chief complaint: Lower extremity weakness    HISTORY OF PRESENTING ILLNESS     The patient is a pleasant 75 y.o. female with past medical history of hypertension, presents with a chief complaint of weakness in her lower legs which started this morning when she was about to get up from her chair. The patient states that she felt dizzy and weak for a few minutes and then felt fine. Patient was recently started on Lasix 5 days ago for shortness of breath. Denies loss of consciousness, fever, headache, chest pain or palpitations  Recent echo done on 4/15/2019 showed left ventricular ejection fraction of 65%, mild left ventricular hypertrophy, left atrium mildly dilated. She says that she is scheduled for stress test next week.     Review of Systems:  General ROS: Completed and except as mentioned above were negative   HEENT ROS: Completed and except as mentioned above were negative   Allergy and Immunology ROS:  Completed and except as mentioned above were negative  Hematological and Lymphatic ROS:  Completed and except as mentioned above were negative  Respiratory ROS:  Completed and except as mentioned above were negative  Cardiovascular ROS:  Completed and except as mentioned above were negative  Gastrointestinal ROS: Completed and except as mentioned above were negative  Genito-Urinary ROS:  Completed and except as mentioned above were negative  Musculoskeletal ROS:  Completed and except as mentioned above were negative  Neurological ROS:  Completed and except as mentioned above were negative  Skin & Dermatological ROS:  Completed and except as mentioned above were negative  Psychological ROS:  Completed and except as mentioned above were negative    PAST MEDICAL HISTORY     Past Medical History:   Diagnosis Date    Arthritis     CHF (congestive heart failure), NYHA class I, acute on chronic, combined ([facility] 9/2/2019    Hypertension     Hypothyroid     Poor circulation        PAST SURGICAL HISTORY     Past Surgical History:   Procedure Laterality Date    EYE SURGERY      SKIN TAG REMOVAL      TOOTH EXTRACTION         ALLERGIES     Other and Sulfa antibiotics    MEDICATIONS PRIOR TO ADMISSION     Prior to Admission medications    Medication Sig Start Date End Date Taking?  Authorizing Provider   furosemide (LASIX) 20 MG tablet Take 1 tablet by mouth daily as needed (edema, shortness of breath) Take 1 tab daily x 3 days 8/28/19   JAY Morgan - CNP   spironolactone (ALDACTONE) 25 MG tablet take 2 tablet by mouth once daily 8/26/19   Arvind Rod MD   hydrALAZINE (APRESOLINE) 50 MG tablet Take 1 tablet by mouth every 8 hours 8/6/19   Yolanda Puente MD   metoprolol tartrate (LOPRESSOR) 50 MG tablet Take 1 tablet by mouth 2 times daily 8/6/19   Yolanda Puente MD   NIFEdipine (ADALAT CC) 30 MG extended release tablet Take 1 tablet by mouth daily 8/6/19   Yolanda Puente MD   losartan (COZAAR) 50 MG tablet Take 1 tablet by mouth daily 6/12/19   Arvind Rod MD   levothyroxine (SYNTHROID) 25 MCG tablet Take 1 tablet by mouth daily 5/30/19   Arvind Rod MD       SOCIAL HISTORY     Tobacco: never smoked  Alcohol: no      FAMILY HISTORY     Family History   Problem Relation Age of Onset    Other Mother         circulatory issues    Heart Disease Father     Stroke Father     Other Father         circulatory issues    Heart Attack Father        PHYSICAL EXAM     Vitals: BP (!) 140/53   Pulse 68   Temp 97.8 °F (36.6 °C) (Oral)   Resp 18   Ht 5' 7\" (1.702 m)   Wt 281 lb 12 oz (127.8 kg)   SpO2 96%   BMI 44.13 kg/m²   Tmax: Temp 0 %    Segs Absolute 5.81 1.50 - 8.10 k/uL    Absolute Lymph # 1.01 (L) 1.10 - 3.70 k/uL    Absolute Mono # 0.49 0.10 - 1.20 k/uL    Absolute Eos # 0.08 0.00 - 0.44 k/uL    Basophils Absolute 0.04 0.00 - 0.20 k/uL    Absolute Immature Granulocyte 0.03 0.00 - 0.30 k/uL    WBC Morphology NOT REPORTED     RBC Morphology ANISOCYTOSIS PRESENT     Platelet Estimate NOT REPORTED    BASIC METABOLIC PANEL    Collection Time: 09/02/19  8:54 AM   Result Value Ref Range    Glucose 117 (H) 70 - 99 mg/dL    BUN 23 8 - 23 mg/dL    CREATININE 0.85 0.50 - 0.90 mg/dL    Bun/Cre Ratio NOT REPORTED 9 - 20    Calcium 8.5 (L) 8.6 - 10.4 mg/dL    Sodium 135 135 - 144 mmol/L    Potassium 4.4 3.7 - 5.3 mmol/L    Chloride 100 98 - 107 mmol/L    CO2 22 20 - 31 mmol/L    Anion Gap 13 9 - 17 mmol/L    GFR Non-African American >60 >60 mL/min    GFR African American >60 >60 mL/min    GFR Comment          GFR Staging NOT REPORTED    Troponin    Collection Time: 09/02/19  8:54 AM   Result Value Ref Range    Troponin, High Sensitivity 55 (HH) 0 - 14 ng/L    Troponin T NOT REPORTED <0.03 ng/mL    Troponin Interp NOT REPORTED    TSH with Reflex    Collection Time: 09/02/19  8:54 AM   Result Value Ref Range    TSH 6.76 (H) 0.30 - 5.00 mIU/L   Brain Natriuretic Peptide    Collection Time: 09/02/19  8:54 AM   Result Value Ref Range    Pro-BNP 9,240 (H) <300 pg/mL    BNP Interpretation Pro-BNP Reference Range:    T4, Free    Collection Time: 09/02/19  8:54 AM   Result Value Ref Range    Thyroxine, Free 1.34 0.93 - 1.70 ng/dL   Venous Blood Gas, POC    Collection Time: 09/02/19  8:56 AM   Result Value Ref Range    pH, Harry 7.460 (H) 7.320 - 7.430    pCO2, Harry 31.5 (L) 41.0 - 51.0 mm Hg    pO2, Harry 36.9 30.0 - 50.0 mm Hg    HCO3, Venous 22.4 22.0 - 29.0 mmol/L    Total CO2, Venous 23 23.0 - 30.0 mmol/L    Negative Base Excess, Harry 1 0.0 - 2.0    Positive Base Excess, Harry NOT REPORTED 0.0 - 3.0    O2 Sat, Harry 75 60.0 - 85.0 %    O2 Device/Flow/% NOT REPORTED

## 2019-09-03 NOTE — PROGRESS NOTES
joel, slight unsteadiness   Gait Deviations: Slow Joel;Decreased step height;Decreased step length  Distance: 25 ft with 1 seated rest break  Comments: Pt required verbal cues 25% of the time for AD management and to remain within close proximity to AD. Pt's BP following ambulation 137/77. Pt demonstrated increased SOB following ambulation with SpO2 of 85 upon sitting with a return to 95% within 2 minutes. Stairs/Curb  Stairs?: No     Balance  Posture: Good  Sitting - Static: Good  Standing - Static: Good;-  Standing - Dynamic: Fair;+  Comments: Static sitting EOB 5 minutes x1 SBA with Good balance. Static standing RW 2 minutes x1 CGA with Good- balance. Dynamic standing RW 3 minutes x1 CGA with Fair+ balance. Plan   Plan  Times per week: 5-6x/wk  Times per day: Daily  Current Treatment Recommendations: Strengthening, Gait Training, Stair training, Balance Training, Functional Mobility Training, Endurance Training, Transfer Training, Safety Education & Training  Safety Devices  Type of devices: All fall risk precautions in place, Call light within reach, Gait belt, Patient at risk for falls, Left in bed    AM-PAC Score  AM-PAC Inpatient Mobility Raw Score : 17 (09/03/19 1213)  AM-PAC Inpatient T-Scale Score : 42.13 (09/03/19 1213)  Mobility Inpatient CMS 0-100% Score: 50.57 (09/03/19 1213)  Mobility Inpatient CMS G-Code Modifier : CK (09/03/19 1213)     Goals  Short term goals  Time Frame for Short term goals: 14 Visits   Short term goal 1: Pt will complete bed mobility independently. Short term goal 2: Pt will perform all transfers using least restrictive device supervision. Short term goal 3: Pt will ambulate 200 ft using least restrictive device supervision. Short term goal 4: Pt will negotiate 4 steps using BUE support CGA. Short term goal 5: Pt will tolerate 45 minutes of activity in order to improve strength and endurance.    Short term goal 6: Pt will improve dynamic standing balance to

## 2019-09-03 NOTE — PROGRESS NOTES
Inpatient ADL T-Scale Score : 40.22 (09/03/19 1206)  ADL Inpatient CMS 0-100% Score: 42.8 (09/03/19 1206)  ADL Inpatient CMS G-Code Modifier : CK (09/03/19 1206)    Goals  Short term goals  Time Frame for Short term goals: Patient will, by discharge  Short term goal 1: demonstrate implementation of energy conservation strategies at Mod I to complete UB self-cares at Mod I   Short term goal 2: demonstrate implementation of energy conservation strategies at Mod I to complete LB self-cares at Mod I   Short term goal 3: demonstrate functional mobility/transfers at Mod I to engage in ADL tasks  Short term goal 4: verbalize 3 energy conservation strategies to implement into ADL routine  Short term goal 5: demonstrate ~25 min of functional activity tolerance to engage in ADL tasks        Therapy Time   Individual Concurrent Group Co-treatment   Time In 1036         Time Out 1059         Minutes [address] Daphne Hernandez, OT

## 2019-09-04 NOTE — PROGRESS NOTES
Skin tag removal; and Eye surgery. Restrictions  Restrictions/Precautions  Restrictions/Precautions: General Precautions, Fall Risk  Required Braces or Orthoses?: No  Position Activity Restriction  Other position/activity restrictions: Up with Assist;   Subjective    Pt sitting up in her bed. Pt willing to sit up in her chair. Pt has no c/o pain. Orientation    Overall Orientation Status: Within Functional Limits  Objective     Bed mobility  Supine to Sit: Moderate assistance  Scooting: Minimal assistance  Comment: Min Ax1 during supine <> sit transfer for LE advancement and Min Ax1 for scooting EOB. Transfers  Sit to Stand: Minimal Assistance  Stand to sit: Minimal Assistance  Comment: STS x1 EOB Min Ax1 RW. STS x1 <> chair RW Mod Ax1. Pt required verbal cues 25% of the time for proper hand placement during transfers with poor return. Ambulation  Ambulation?: Yes  Ambulation 1  Surface: level tile  Device: Rolling Walker  Assistance: Contact guard assistance  Quality of Gait: Slow joel, slight unsteadiness   Gait Deviations: Slow Joel;Decreased step height;Decreased step length  Distance: 25 ft x 2 with chair to follow  Comments: Pt required verbal cues 25% of the time for AD management and to remain within close proximity to AD. Stairs/Curb  Stairs?: No  Balance  Posture: Good  Sitting - Static: Good  Standing - Static: Good;-  Standing - Dynamic: Fair;+  Comments: Static sitting EOB 5 minutes x1 SBA with Good balance. Ex's  Upper extremity exercises: Bicep curl, shoulder flexion/extension, punches. Reps: 10 x each with 2 lb wt on a SPC. $ lbs with Biceps. Seated LE exercise program: Long Arc Quads,heel/toe raises, and marches. Reps: 2 x 10 each with 2 lb wt on B LE's. Goals  Short term goals  Time Frame for Short term goals: 14 Visits   Short term goal 1: Pt will complete bed mobility independently.   Short term goal 2: Pt will perform all transfers using least restrictive device

## 2019-09-05 NOTE — BRIEF OP NOTE
Brief Postoperative Note  ______________________________________________________________    Patient: Nedra Cabrera  YOB: 1945  MRN: [id number]  Date of Procedure: 9/5/2019    Procedure: Cardiac cath coronary angiography, LV angiography, hemodynamic    Pre-Op Diagnosis: Non-ST elevation MI    Post-Op Diagnosis: Same  Anesthesia: 1% lidocaine 20 mL left groin, 1 mg IV Versed, 25 mcg IV fentanyl    Estimated Blood Loss (mL): Less than 15 mL    Complications: None    Findings: Normal left main, LAD and circumflex. Mid RCA more than 70%, proximal PDA more than 80% stenosis. Normal LV systolic function, ejection fraction more than 60%.     Plan: PCI proximal PDA and mid RCA    Olivia Estrada MD  Date: 9/5/2019  Time: 1:09 PM

## 2019-09-06 NOTE — CONSULTS
body habitus and patient intolerance to exam, unable to visualize cervix, atrophic vaginal tissue, thin grey discharge noted in vaginal vault, no active bleeding noted   - Minimal dark spotting noted on pad   - Hgb 10.9 (9/2) > 10.3 (9/5) > 8.7 (9/6) > 9.1 > 9.2 (stable)    - Recommend TVUS   - Will continue to follow    Acute on Chronic Diastolic CHF   - Patient had a cardiac catheterization with 2 stents placed on 9/5/19 and started on Brilinta due to drug eluting stent    - Management per Cardiology and primary    - Continue Bumex 1 mg qd, Lopressor 50 mg BID and Aldactone 25 mg qd    Hypertension   - Management per primary     Hypothyroidism   - Management per primary    - Continue Synthroid 50 mcg qd    Obesity (BMI 42.2)     Patient Active Problem List    Diagnosis Date Noted    Weakness of both lower limbs     CHF (congestive heart failure), NYHA class I, acute on chronic, combined ([facility] 09/02/2019    Morbid obesity ([facility] 06/12/2019    Hypertensive urgency 04/14/2019    Pneumonia 04/13/2019    Hypertension     Arthritis     Poor circulation        Plan discussed with Dr. Ingris Norwood, who is agreeable.      Attending's Name: Dr. Karla Short DO  Ob/Gyn Resident  Pager: [phone]  9/7/2019, 4:27 AM

## 2019-09-06 NOTE — PROGRESS NOTES
complete(Min to wash hair, SBA to wash face and brush dentures)  UE Bathing: Minimal assistance;Setup; Increased time to complete(w/back)  LE Bathing: Minimal assistance;Setup; Increased time to complete(w/lower legs and feet)  UE Dressing: Minimal assistance;Setup; Increased time to complete(w/gown)  LE Dressing: Maximum assistance;Setup; Increased time to complete(w/footies)  Toileting: Stand by assistance        Balance  Sitting Balance: Stand by assistance  Standing Balance: Contact guard assistance  Standing Balance  Time: Pt stood for approx 30 sec at chair side w/no LOB     Transfers  Sit to stand: Minimal assistance  Stand to sit: Minimal assistance     Plan   Plan  Times per week: 3-4x/wk  Current Treatment Recommendations: Strengthening, Endurance Training, Patient/Caregiver Education & Training, Self-Care / ADL, Home Management Training, Safety Education & Training, Functional Mobility Training    Goals  Short term goals  Time Frame for Short term goals: Patient will, by discharge  Short term goal 1: demonstrate implementation of energy conservation strategies at Mod I to complete UB self-cares at Mod I   Short term goal 2: demonstrate implementation of energy conservation strategies at Mod I to complete LB self-cares at Mod I   Short term goal 3: demonstrate functional mobility/transfers at Mod I to engage in ADL tasks  Short term goal 4: verbalize 3 energy conservation strategies to implement into ADL routine  Short term goal 5: demonstrate ~25 min of functional activity tolerance to engage in ADL tasks        Therapy Time   Individual Concurrent Group Co-treatment   Time In 1405         Time Out  [address]/L

## 2019-09-06 NOTE — PROGRESS NOTES
[facility]  Internal Medicine Teaching Residency Program  Inpatient Daily Progress Note  ______________________________________________________________________________    Patient: Enedelia Somers  YOB: 1945   VVY:[id number]    Acct: [de-identified]     Room: 2021/2021-01  Admit date: 9/2/2019  Today's date: 09/06/19  Number of days in the hospital: 4    SUBJECTIVE   Admitting Diagnosis: CHF (congestive heart failure), NYHA class I, acute on chronic, combined ([facility] [address] [facility]  Pt examined at bedside. Chart & results reviewed. This morning patient was laying comfortably in bed. Felt much better than yesterday. Overnight as per the nurse the patient had some vaginal bleeding. There was just one episode that is why the overnight team did not consult OBG. Patient's hemoglobin did drop from 10.6 to 8.7. She has never experienced this kind of bleeding before. This morning the patient was wearing a pad but just has some spotting. She got her cardiac cath done yesterday and got 2 stents placed. Post-cath the patient just felt nauseous but now feels much better. Vitals stable. ROS:  Constitutional:  negative for chills, fevers, sweats  Respiratory:  negative for cough, dyspnea on exertion, hemoptysis, shortness of breath, wheezing  Cardiovascular:  negative for chest pain, chest pressure/discomfort, lower extremity edema, palpitations  Gastrointestinal:  negative for abdominal pain, constipation, diarrhea, nausea, vomiting  Neurological:  negative for dizziness, headache  BRIEF HISTORY   Patient came in with lower extremity weakness. She has a history of hypothyroidism. Complaint of shortness of breath, has bilateral pitting edema of her legs. Follows with cardiology outpatient. She got her cardiac cath done yesterday which showed RCA and proximal artery stenosis status post 2 stents. The patient was started on aspirin and ticagrelor.   The patient did experience

## 2019-09-07 NOTE — PROGRESS NOTES
placement  On aspirin and ticagrelor.     Bilateral lower extremity weakness  Resolved  Secondary to hypothyroidism. Levothyroxine 50 MCG  TSH in 4 to 6 weeks outpatient.      Active Problems:    Hypertension  Plan: On Lopressor 50 twice daily and Aldactone       Morbid obesity (HCC)  Plan: Advised diet control     One episode of vagina bleeding:  Hemoglobin 8.8-->9.2-->7.9  OB/GYN on board and recommend TVUS  Will hold Lovenox for now  TVUS: Endometrial tissue 2.6 cm in thickness which is abnormal in a patient of this age. Additional evaluation with MRI, ovaries were not present. Moderate amount of free fluid seen in the pelvis which is nonspecific. Positive for vaginitis. Started her on metronidazole 500 mg tablet. LINDSEY:  Creatinine 1.29, BUN 27  Status post cardiac cath  Continue hydration             PT/OT: On board  Discharge Planning / Neisha Griffith: Home with home care            Jeffry Drake MD  Internal Medicine Resident, PGY-1  [address], [place]  9/7/2019, 6:24 AM

## 2019-09-08 NOTE — PROGRESS NOTES
Progress Note    Date: 9/8/2019  Time: 12:43 AM    Maribel Ramos is a 75 y.o. female G0    Patient was seen and examined. She complained of nothing this morning. Pain is  controlled. Patient is  tolerating oral intake. She is urinating well . She admits to minimal vaginal bleeding. She is ambulating without difficulty. She is  passing flatus. She denies Fever/Chills, Chest Pain, SOB, N/V, Calf Pain. Vitals:  Vitals:    09/07/19 0835 09/07/19 1849 09/07/19 1956 09/07/19 2100   BP: (!) 151/66  124/76 116/65   Pulse: 89 67 77 72   Resp: 18 16 18 19   Temp: 98.3 °F (36.8 °C)  98.7 °F (37.1 °C)    TempSrc: Oral  Oral    SpO2: 91% 99% 98% 96%   Weight:       Height:             Intake/Output:   Last Shift: I/O last 3 completed shifts: In: 18 [P.O.:990]  Out: 775 [Urine:775]  Current Shift: No intake/output data recorded. Physical Exam:  General:  no apparent distress, alert and cooperative  Neurologic:  alert, oriented, normal speech, no focal findings or movement disorder noted  Lungs:  No increased work of breathing, good air exchange, clear to auscultation bilaterally, no crackles or wheezing  Heart:  regular rate and rhythm and no murmur    Abdomen: Abdomen soft, non-tender. BS normal. No masses,  No organomegaly  Extremities:  no calf tenderness, non edematous    Lab:  Complete Blood Count:   Recent Labs     09/05/19  1828 09/06/19  0451  09/07/19  1225 09/07/19  1828 09/07/19  2349   WBC 13.6* 10.2  --   --   --   --    HGB 10.3* 8.7*   < > 8.2* 8.0* 7.5*   HCT 33.5* 28.2*   < > 26.1* 25.9* 24.1*    292  --   --   --   --     < > = values in this interval not displayed.         PT/INR:    Lab Results   Component Value Date    PROTIME 13.3 09/06/2019    INR 1.3 09/06/2019     PTT:    Lab Results   Component Value Date    APTT 33.9 09/05/2019       Comprehensive Metabolic Profile:   Recent Labs     09/06/19  0451 09/06/19  1749    136   K 4.4 4.2    100   CO2 24 22   BUN 20 27*

## 2019-09-08 NOTE — PROGRESS NOTES
Wt 266 lb 15.6 oz (121.1 kg)   SpO2 97%   BMI 41.81 kg/m²     Temp (24hrs), Av.6 °F (37 °C), Min:98.3 °F (36.8 °C), Max:98.8 °F (37.1 °C)    In: 870   Out: 400 [Urine:400]    Physical Exam:  Constitutional: This is a well developed, well nourished, Greater than 40 - Morbid Obesity / Extreme Obesity / Grade III 75y.o. year old female who is alert, oriented, cooperative and in no apparent distress. Head:normocephalic and atraumatic. EENT:  PERRLA. No conjunctival injections. Neck: Supple without thyromegaly. No elevated JVP. Trachea was midline. Respiratory: Chest was symmetrical without dullness to percussion. Breath sounds bilaterally were clear to auscultation. Cardiovascular: Regular without murmur, clicks, gallops or rubs. Abdomen: Slightly rounded and soft without organomegaly. No rebound, rigidity or guarding was appreciated. Extremities: Bilateral lower extremity pitting edema  Skin:  Warm and dry. Good color, turgor and pigmentation. No lesions or scars.   No cyanosis or clubbing  Neurological/Psychiatric: The patient's general behavior, level of consciousness, thought content and emotional status is normal.        Medications:  Scheduled Medications:    metroNIDAZOLE  500 mg Oral 2 times per day    bumetanide  1 mg Oral Daily    sodium chloride flush  10 mL Intravenous 2 times per day    ticagrelor  90 mg Oral BID    levothyroxine  50 mcg Oral Daily    spironolactone  25 mg Oral Daily    sodium chloride flush  10 mL Intravenous 2 times per day    aspirin  81 mg Oral Daily    atorvastatin  40 mg Oral Nightly    metoprolol tartrate  50 mg Oral BID     Continuous Infusions:   PRN Medications    sodium chloride flush 10 mL PRN   acetaminophen 650 mg Q4H PRN   sodium chloride flush 10 mL PRN   magnesium hydroxide 30 mL Daily PRN   ondansetron 4 mg Q6H PRN       Diagnostic Labs:  CBC:   Recent Labs     19  1828 19  0451  19  1828 19  2349 19  0550

## 2019-09-09 NOTE — CONSULTS
mL Intravenous 2 times per day Darya Rockwell MD   10 mL at 09/08/19 2034    sodium chloride flush 0.9 % injection 10 mL  10 mL Intravenous PRN Darya Rockwell MD        magnesium hydroxide (MILK OF MAGNESIA) 400 MG/5ML suspension 30 mL  30 mL Oral Daily PRN Darya Rockwell MD        ondansetron (ZOFRAN) injection 4 mg  4 mg Intravenous Q6H PRN Darya Rockwell MD   4 mg at 09/05/19 1810    aspirin chewable tablet 81 mg  81 mg Oral Daily Tatiana Wong MD   81 mg at 09/09/19 0827    atorvastatin (LIPITOR) tablet 40 mg  40 mg Oral Nightly Tatiana Diego MD   40 mg at 09/08/19 2033    metoprolol tartrate (LOPRESSOR) tablet 50 mg  50 mg Oral BID Darya Rockwell MD   50 mg at 09/09/19 0827       Allergies: Other and Sulfa antibiotics    Social History:   reports that she has never smoked. She has never used smokeless tobacco. She reports that she does not drink alcohol or use drugs. Family History: family history includes Heart Attack in her father; Heart Disease in her father; Other in her father and mother; Stroke in her father. REVIEW OF SYSTEMS:      Constitutional: No fever or chills. No night sweats, positive weight loss   Eyes: No eye discharge, double vision, or eye pain   HEENT: negative for sore mouth, sore throat, hoarseness and voice change   Respiratory: negative for cough , sputum, dyspnea, wheezing, hemoptysis, chest pain   Cardiovascular: negative for chest pain, dyspnea, palpitations, orthopnea, PND   Gastrointestinal: negative for nausea, vomiting, diarrhea, constipation, abdominal pain, Dysphagia, hematemesis and hematochezia   Genitourinary: negative for frequency, dysuria, nocturia, urinary incontinence, and hematuria   Integument: negative for rash, skin lesions, bruises.    Hematologic/Lymphatic: Positive right groin mass  Endocrine: negative for heat or cold intolerance,weight changes, change in bowel habits and hair loss   Musculoskeletal: negative for myalgias, arthralgias, pain, joint Patient will eventually need biopsy however she is currently on antiplatelet therapy due to recent stent placement. We will have to work with interventional radiology as well as cardiology to determine how can we proceed with the biopsy safely  Continue to monitor hemoglobin. Keep above 8 given history of coronary artery disease  We will continue to follow. Discussed with patient and Nurse. Thank you for asking us to see this patient. Shivam Dolan MD        This note is created with the assistance of a speech recognition program.  While intending to generate a document that actually reflects the content of the visit, the document can still have some errors including those of syntax and sound a like substitutions which may escape proof reading. It such instances, actual meaning can be extrapolated by contextual diversion.

## 2019-09-09 NOTE — PROGRESS NOTES
[facility]  Internal Medicine Teaching Residency Program  Inpatient Daily Progress Note  ______________________________________________________________________________    Patient: Vicki Cobb  YOB: 1945   QGU:[id number]    Acct: [de-identified]     Room: 2021/2021-01  Admit date: 9/2/2019  Today's date: 09/09/19  Number of days in the hospital: 7    SUBJECTIVE   Admitting Diagnosis: CHF (congestive heart failure), NYHA class I, acute on chronic, combined ([facility]  CC: Weakness of lower legs  Pt examined at bedside. Chart & results reviewed. Patient is lying comfortably in bed. No acute events overnight. No more episodes of vaginal bleeding. The patient is ambulating with assistance, urinating and passing bowel movements. After an episode of vaginal bleeding OB/GYN was consulted. MRI done this morning showed endometrium is abnormally thickened and bilateral adnexal masses. Her hemoglobin is becoming stable, 8.7--> 9.2-->8.2-->7.8-->7.3    ROS:  Constitutional:  negative for chills, fevers, sweats  Respiratory:  negative for cough, dyspnea on exertion, hemoptysis, shortness of breath, wheezing  Cardiovascular:  negative for chest pain, chest pressure/discomfort, lower extremity edema, palpitations  Gastrointestinal:  negative for abdominal pain, constipation, diarrhea, nausea, vomiting  Neurological:  negative for dizziness, headache  BRIEF HISTORY     Patient came in with lower extremity weakness. She has a history of hypothyroidism. Complaint of shortness of breath, has bilateral pitting edema of her legs. Follows with cardiology outpatient. She got her cardiac cath done yesterday which showed RCA and proximal artery stenosis status post 2 stents. The patient was started on aspirin and ticagrelor. The patient did experience one episode of vaginal bleeding. Hemoglobin dropped to 7.8.   MRI showed abnormally thickened endometrium and bilateral adnexal masses. OBJECTIVE     Vital Signs:  BP (!) 153/82   Pulse 70   Temp 98.1 °F (36.7 °C) (Oral)   Resp 18   Ht 5' 7\" (1.702 m)   Wt 266 lb 15.6 oz (121.1 kg)   SpO2 98%   BMI 41.81 kg/m²     Temp (24hrs), Av °F (36.7 °C), Min:97.9 °F (36.6 °C), Max:98.1 °F (36.7 °C)    In: 240   Out: 400 [Urine:400]    Physical Exam:  Constitutional: This is a well developed, well nourished, Greater than 40 - Morbid Obesity / Extreme Obesity / Grade III 75y.o. year old female who is alert, oriented, cooperative and in no apparent distress. Head:normocephalic and atraumatic. EENT:  PERRLA. No conjunctival injections. Neck: Supple without thyromegaly. No elevated JVP. Trachea was midline. Respiratory: Chest was symmetrical without dullness to percussion. Breath sounds bilaterally were clear to auscultation. Cardiovascular: Regular without murmur, clicks, gallops or rubs. Abdomen: Slightly rounded and soft without organomegaly. No rebound, rigidity or guarding was appreciated. Extremities: Bilateral lower extremity pitting edema  Skin:  Warm and dry. Good color, turgor and pigmentation. No lesions or scars.   No cyanosis or clubbing  Neurological/Psychiatric: The patient's general behavior, level of consciousness, thought content and emotional status is normal.        Medications:  Scheduled Medications:    polyethylene glycol  17 g Oral Daily    metroNIDAZOLE  500 mg Oral 2 times per day    bumetanide  1 mg Oral Daily    sodium chloride flush  10 mL Intravenous 2 times per day    ticagrelor  90 mg Oral BID    levothyroxine  50 mcg Oral Daily    spironolactone  25 mg Oral Daily    sodium chloride flush  10 mL Intravenous 2 times per day    aspirin  81 mg Oral Daily    atorvastatin  40 mg Oral Nightly    metoprolol tartrate  50 mg Oral BID     Continuous Infusions:   PRN Medications    sodium chloride flush 30 mL PRN   sodium chloride flush 10 mL PRN   acetaminophen 650 mg Q4H PRN   sodium chloride flush 10 mL PRN   magnesium hydroxide 30 mL Daily PRN   ondansetron 4 mg Q6H PRN       Diagnostic Labs:  CBC:   Recent Labs     09/08/19  0550  09/08/19  1845 09/09/19  0013 09/09/19  0517   WBC 7.8  --   --   --  7.2   RBC 2.49*  --   --   --  2.28*   HGB 7.9*  7.8*   < > 8.2* 7.1* 7.3*   HCT 25.5*  25.3*   < > 27.1* 23.0* 24.2*   .4  --   --   --  106.1*   RDW 15.9*  --   --   --  16.1*     --   --   --  284    < > = values in this interval not displayed. BMP:   Recent Labs     09/06/19  1749 09/08/19  0550 09/09/19  0517    135 136   K 4.2 4.0 3.8    99 102   CO2 22 25 25   BUN 27* 28* 25*   CREATININE 1.29* 0.92* 0.97*     BNP: No results for input(s): BNP in the last 72 hours. PT/INR:   No results for input(s): PROTIME, INR in the last 72 hours. APTT:   No results for input(s): APTT in the last 72 hours. CARDIAC ENZYMES: No results for input(s): CKMB, CKMBINDEX, TROPONINI in the last 72 hours. Invalid input(s): CKTOTAL;3  FASTING LIPID PANEL:  Lab Results   Component Value Date    CHOL 157 05/30/2019    HDL 37 (L) 05/30/2019    TRIG 130 05/30/2019     LIVER PROFILE: No results for input(s): AST, ALT, ALB, BILIDIR, BILITOT, ALKPHOS in the last 72 hours. MICROBIOLOGY:   Lab Results   Component Value Date/Time    CULTURE NO SIGNIFICANT GROWTH 09/03/2019 12:50 AM       Imaging:    Xr Chest Standard (2 Vw)    Result Date: 9/2/2019  Mild cardiomegaly. No acute findings in the chest.     Us Non Ob Transvaginal    Result Date: 9/6/2019  1. Essentially nondiagnostic study due to significantly limited visibility of the uterus and endometrial tissue. On transabdominal images, the endometrial tissue appears to measure up to 2.6 cm in thickness, which is abnormal in a patient of this age. Consider additional evaluation with MRI or short-term follow-up pelvic ultrasound. Ovaries were not seen.  2.  There is a moderate amount of free fluid seen in the pelvis, which is

## 2019-09-09 NOTE — CARE COORDINATION
Transitional planning-1130 Call from [facility] at [facility] take whenever discharged. 26 Talked with patient-still wanting to go to [facility].

## 2019-09-09 NOTE — PROGRESS NOTES
Nutrition Assessment    Type and Reason for Visit: Initial(LOS Day 7)    Nutrition Recommendations:   -Continue cardiac diet w/ 1500 mL FR  -Recommend ensure enlive supplements QD   -Will monitor po intake and weights     Nutrition Assessment:  Pt nutritionally compromised aeb variable po intake since admission. Pt stated that her appetite was poor in the beginning of her admission and is currently picking up and consuming 25-75% of her meals. Pt reported intentional wt loss over the past 5 months and is very happy about it and hopes it continues. Will add ensure supplements QD per pt request and will monitor po intake and weights. Malnutrition Assessment:  · Malnutrition Status: Insufficient data  · Context: Acute illness or injury  · Findings of the 6 clinical characteristics of malnutrition (Minimum of 2 out of 6 clinical characteristics is required to make the diagnosis of moderate or severe Protein Calorie Malnutrition based on AND/ASPEN Guidelines):  1. Energy Intake-Unable to assess,      2. Weight Loss-10% loss or greater(intentional per pt ), (x 5 mo)  3. Fat Loss-No significant subcutaneous fat loss,    4. Muscle Loss-No significant muscle mass loss,    5. Fluid Accumulation-Mild fluid accumulation, Extremities  6.  Strength-Not measured    Nutrition Risk Level:  Moderate    Nutrient Needs:  · Estimated Daily Total Kcal: 1.2 ~>2200 kcals/d   · Estimated Daily Protein (g): 1.2-1.3 gm/kg ~>73-79 gms/d     Nutrition Diagnosis:   · Problem: Inadequate oral intake  · Etiology: related to Insufficient energy/nutrient consumption(decreased appetite )     Signs and symptoms:  as evidenced by Diet history of poor intake, Patient report of, Intake 25-50%, Intake 50-75%    Objective Information:  · Wound Type: None  · Current Nutrition Therapies:  · Oral Diet Orders: Cardiac, Fluid Restriction   · Oral Diet intake: 26-50%, 51-75%  · Oral Nutrition Supplement (ONS) Orders: None  · Anthropometric

## 2019-09-09 NOTE — PLAN OF CARE
Problem: Falls - Risk of:  Goal: Will remain free from falls  Description  Will remain free from falls  9/8/2019 2136 by Zaynab Schmidt RN  Outcome: Ongoing  9/8/2019 1823 by Rajani Hannah RN  Outcome: Ongoing  Goal: Absence of physical injury  Description  Absence of physical injury  9/8/2019 2136 by Zaynab Schmidt RN  Outcome: Ongoing  9/8/2019 1823 by Rajani Hannah RN  Outcome: Ongoing     Problem: Skin Integrity:  Goal: Will show no infection signs and symptoms  Description  Will show no infection signs and symptoms  Outcome: Ongoing  Goal: Absence of new skin breakdown  Description  Absence of new skin breakdown  9/8/2019 2136 by Zaynab Schmidt RN  Outcome: Ongoing  9/8/2019 1823 by Rajani Hannah RN  Outcome: Ongoing

## 2019-09-09 NOTE — PROGRESS NOTES
Bed Mobility  Supine to Sit: Min. A x 1  Scooting: Min. A x 1  Transfers  Sit to Stand: Minimal Assistance  Stand to sit: Minimal Assistance  Comment: vc's for hand placement     Ambulation  Ambulation?: Yes  Ambulation 1  Surface: level tile  Device: Rolling Walker  Assistance: Contact guard assistance  Quality of Gait: Slow joel  Gait Deviations: Slow Joel;Decreased step height;Decreased step length  Distance: 40ft x 1, seated rest, 40ft x 1   02: 2L  Stairs/Curb  Stairs?: No    Balance  Posture: Good  Sitting - Static: Good  Sitting - Dynamic: Good  Standing - Static: Good;-  Standing - Dynamic: Fair;+   Exercises  Seated LE exercise program: Long Arc Quads,heel/toe raises, and marches. Reps: x 20 x each with 2 lb wt on B LE's. Goals  Short term goals  Time Frame for Short term goals: 14 Visits   Short term goal 1: Pt will complete bed mobility independently. Short term goal 2: Pt will perform all transfers using least restrictive device supervision. Short term goal 3: Pt will ambulate 200 ft using least restrictive device supervision. Short term goal 4: Pt will negotiate 4 steps using BUE support CGA. Short term goal 5: Pt will tolerate 45 minutes of activity in order to improve strength and endurance. Short term goal 6: Pt will improve dynamic standing balance to Good. Plan    Plan  Times per week: 5-6x/wk  Times per day: Daily  Current Treatment Recommendations: Strengthening, Gait Training, Stair training, Balance Training, Functional Mobility Training, Endurance Training, Transfer Training, Safety Education & Training  Safety Devices  Type of devices:  All fall risk precautions in place, Call light within reach, Gait belt, Patient at risk for falls, Left in chair     Therapy Time   Individual Concurrent Group Co-treatment   Time In  915         Time Out  1000         Minutes  [address] [place] [address]

## 2019-09-09 NOTE — PROGRESS NOTES
Occupational Therapy  Facility/Department: [facility]  Daily Treatment Note  NAME: Evonne Mendoza  : 1945  MRN: [id number]    Date of Service: 2019    Discharge Recommendations: Further therapy recommended at discharge. Assessment   Performance deficits / Impairments: Decreased functional mobility ; Decreased safe awareness;Decreased ADL status; Decreased endurance;Decreased high-level IADLs;Decreased strength  Assessment: Patient demonstrates decreased endurance and strength impacting performance in functional mobility/transfers and with safety and independence with ADL/IADL tasks. Patient is expected to need skilled OT services at this time to address functional limtiations impacting independence. Prognosis: Good  REQUIRES OT FOLLOW UP: Yes  Activity Tolerance  Activity Tolerance: Patient Tolerated treatment well  Safety Devices  Safety Devices in place: Yes  Type of devices: All fall risk precautions in place;Call light within reach; Patient at risk for falls; Left in bed;Nurse notified         Patient Diagnosis(es): The encounter diagnosis was Congestive heart failure, unspecified HF chronicity, unspecified heart failure type ([facility] has a past medical history of Arthritis, CHF (congestive heart failure), NYHA class I, acute on chronic, combined ([address] Hypertension, Hypothyroid, and Poor circulation. has a past surgical history that includes Tooth Extraction; Skin tag removal; Eye surgery; and Coronary angioplasty with stent (2019). Restrictions  Restrictions/Precautions  Restrictions/Precautions: General Precautions, Fall Risk  Required Braces or Orthoses?: No  Position Activity Restriction  Other position/activity restrictions:  Up with Assist;   Subjective   General  Patient assessed for rehabilitation services?: Yes  Family / Caregiver Present: No  Pain Assessment  Response to Pain Intervention: Patient Satisfied  Vital Signs  Patient Currently in Pain: Denies   Orientation

## 2019-09-09 NOTE — PROGRESS NOTES
Spoke with Mendel Councilman floor RN. Dr Conrad Ac does not want to do IR procedure unless Brilinta is held for 5 day.

## 2019-09-10 PROBLEM — I26.99 PULMONARY EMBOLISM (HCC): Status: ACTIVE | Noted: 2019-01-01

## 2019-09-10 NOTE — PROGRESS NOTES
8.1 (L) 11.9 - 15.1 g/dL    Hematocrit 26.1 (L) 36.3 - 47.1 %   HEMOGLOBIN AND HEMATOCRIT, BLOOD    Collection Time: 09/09/19 11:34 PM   Result Value Ref Range    Hemoglobin 7.6 (L) 11.9 - 15.1 g/dL    Hematocrit 24.1 (L) 36.3 - 47.1 %   Basic Metabolic Panel w/ Reflex to MG    Collection Time: 09/10/19  4:57 AM   Result Value Ref Range    Glucose 98 70 - 99 mg/dL    BUN 21 8 - 23 mg/dL    CREATININE 0.90 0.50 - 0.90 mg/dL    Bun/Cre Ratio NOT REPORTED 9 - 20    Calcium 7.6 (L) 8.6 - 10.4 mg/dL    Sodium 135 135 - 144 mmol/L    Potassium 4.0 3.7 - 5.3 mmol/L    Chloride 100 98 - 107 mmol/L    CO2 26 20 - 31 mmol/L    Anion Gap 9 9 - 17 mmol/L    GFR Non-African American >60 >60 mL/min    GFR African American >60 >60 mL/min    GFR Comment          GFR Staging NOT REPORTED    CBC WITH AUTO DIFFERENTIAL    Collection Time: 09/10/19  4:57 AM   Result Value Ref Range    WBC 7.6 3.5 - 11.3 k/uL    RBC 2.34 (L) 3.95 - 5.11 m/uL    Hemoglobin 7.2 (L) 11.9 - 15.1 g/dL    Hematocrit 24.0 (L) 36.3 - 47.1 %    .6 82.6 - 102.9 fL    MCH 30.8 25.2 - 33.5 pg    MCHC 30.0 28.4 - 34.8 g/dL    RDW 16.4 (H) 11.8 - 14.4 %    Platelets 111 145 - 443 k/uL    MPV 9.2 8.1 - 13.5 fL    NRBC Automated 0.0 0.0 per 100 WBC    Differential Type NOT REPORTED     WBC Morphology NOT REPORTED     RBC Morphology ANISOCYTOSIS PRESENT     Platelet Estimate NOT REPORTED     Seg Neutrophils 78 (H) 36 - 65 %    Lymphocytes 12 (L) 24 - 43 %    Monocytes 7 3 - 12 %    Eosinophils % 2 1 - 4 %    Basophils 0 0 - 2 %    Immature Granulocytes 1 (H) 0 %    Segs Absolute 5.91 1.50 - 8.10 k/uL    Absolute Lymph # 0.88 (L) 1.10 - 3.70 k/uL    Absolute Mono # 0.52 0.10 - 1.20 k/uL    Absolute Eos # 0.18 0.00 - 0.44 k/uL    Basophils Absolute 0.03 0.00 - 0.20 k/uL    Absolute Immature Granulocyte 0.04 0.00 - 0.30 k/uL       Assessment/Plan:  Maribel Ramos 75 y.o. female G0                 Postmenopausal Vaginal Bleeding              - VSS, afebrile - Currently on Synthroid 50 mcg daily     Obesity              -BMI 41.9      Patient Active Problem List    Diagnosis Date Noted    Inguinal lymphadenopathy     Congestive heart failure (HCC)     Postmenopausal bleeding     Weakness of both lower limbs     CHF (congestive heart failure), NYHA class I, acute on chronic, combined ([facility] 09/02/2019    Morbid obesity ([facility] 06/12/2019    Hypertensive urgency 04/14/2019    Pneumonia 04/13/2019    Hypertension     Arthritis     Poor circulation          Mely Parry DO  Ob/Gyn Resident    9/10/2019, 6:40 AM

## 2019-09-10 NOTE — PROGRESS NOTES
Intravenous Once    polyethylene glycol  17 g Oral Daily    metroNIDAZOLE  500 mg Oral 2 times per day    bumetanide  1 mg Oral Daily    sodium chloride flush  10 mL Intravenous 2 times per day    ticagrelor  90 mg Oral BID    levothyroxine  50 mcg Oral Daily    spironolactone  25 mg Oral Daily    sodium chloride flush  10 mL Intravenous 2 times per day    aspirin  81 mg Oral Daily    atorvastatin  40 mg Oral Nightly    metoprolol tartrate  50 mg Oral BID     Continuous Infusions:    sodium chloride       PRN Medications  sodium chloride flush 30 mL PRN   sodium chloride flush 10 mL PRN   acetaminophen 650 mg Q4H PRN   sodium chloride flush 10 mL PRN   magnesium hydroxide 30 mL Daily PRN   ondansetron 4 mg Q6H PRN       Diagnostic Labs:  CBC: Recent Labs     09/08/19  0550  09/09/19  0517  09/09/19  1821 09/09/19  2334 09/10/19  0457   WBC 7.8  --  7.2  --   --   --  7.6   RBC 2.49*  --  2.28*  --   --   --  2.34*   HGB 7.9*  7.8*   < > 7.3*   < > 8.1* 7.6* 7.2*   HCT 25.5*  25.3*   < > 24.2*   < > 26.1* 24.1* 24.0*   .4  --  106.1*  --   --   --  102.6   RDW 15.9*  --  16.1*  --   --   --  16.4*     --  284  --   --   --  279    < > = values in this interval not displayed. BMP: Recent Labs     09/08/19  0550 09/09/19  0517 09/10/19  0457    136 135   K 4.0 3.8 4.0   CL 99 102 100   CO2 25 25 26   BUN 28* 25* 21   CREATININE 0.92* 0.97* 0.90     BNP: No results for input(s): BNP in the last 72 hours. PT/INR: No results for input(s): PROTIME, INR in the last 72 hours. APTT: No results for input(s): APTT in the last 72 hours. CARDIAC ENZYMES: No results for input(s): CKMB, CKMBINDEX, TROPONINI in the last 72 hours. Invalid input(s): CKTOTAL;3  FASTING LIPID PANEL:  Lab Results   Component Value Date    CHOL 157 05/30/2019    HDL 37 (L) 05/30/2019    TRIG 130 05/30/2019     LIVER PROFILE: No results for input(s): AST, ALT, ALB, BILIDIR, BILITOT, ALKPHOS in the last 72 hours. called by Dr. Glenn Partida to Dr. Jeffrey Cranker on 9/9/2019 at 22:38. ASSESSMENT & PLAN     ASSESSMENT / PLAN:     Principal Problem:Principal Problem:  Bilateral pulmonary embolism:  Plan for IVC filter today by the IR team  Risk for bleeding given prior episode of vaginal bleeding, so anticoagulation not recommended    Postmenopausal vagina bleeding:  Hemoglobin 7.2  Will hold Lovenox for now  TVUS: Endometrial tissue 2.6 cm in thickness which is abnormal in a patient of this age. MRI: Thickened endometrial lining, bilateral adnexal masses, omental caking, moderate ascites  Ca 125 elevated at 2780  CEA elevated at 25.1   elevated at 2/3/2009  Possible plan for endometrial biopsy. F/U with OBG/GYN and GYN/ONC recommendations         CHF (congestive heart failure), NYHA class I, acute on chronic, combined (HCC)  Plan: Asymptomatic.    Last echo in 04/2019 showed left ventricle ejection fraction of 55%. Bilateral pitting edema     NSTEMI:  Troponin 44, no chest pain, EKG: Normal sinus rhythm, anterior infarct. Cardiac cath: RCA and proximal artery stenosis status post 2 stent placement  On aspirin and Brilinta     Bilateral lower extremity weakness:  Resolved  Secondary to hypothyroidism. Levothyroxine 50 MCG  TSH in 4 to 6 weeks outpatient.        Hypertension: Controlled  Plan: On Lopressor 50 twice daily and Aldactone       Morbid obesity (HCC)  Plan: Advised diet control        Bacterial vaginosis  Flagyl 500 mg twice daily for 7 days     LINDSEY: Secondary to contrast related injury  Resolved       DVT: SCD  PT/OT: On board  Discharge Planning / SW: Home with home care         Brenda Teran MD  Internal Medicine Resident, PGY-1  [address] [place], [place]  9/10/2019, 11:10 AM

## 2019-09-10 NOTE — PROGRESS NOTES
Physical Therapy  DATE: 9/10/2019    NAME: Anastasia Delgadillo  MRN: [id number]   : 1945    Patient not seen this date for Physical Therapy due to:  [] Blood transfusion in progress  [] Hemodialysis  []  Patient Declined  [] Spine Precautions   [] Strict Bedrest  [] Surgery/ Procedure  [] Testing      [x] Other Pt with Dopplers ordered but not completed. Will await results       [] PT being discontinued at this time. Patient independent. No further needs. [] PT being discontinued at this time as the patient has been transferred to palliative care. No further needs.     Lilyan Balloon, PTA

## 2019-09-10 NOTE — PROGRESS NOTES
Oral BID Kylee Buitrago MD   90 mg at 09/09/19 2130    levothyroxine (SYNTHROID) tablet 50 mcg  50 mcg Oral Daily Kylee Buitrago MD   50 mcg at 09/09/19 0827    spironolactone (ALDACTONE) tablet 25 mg  25 mg Oral Daily Tatiana Wong MD   25 mg at 09/09/19 0826    sodium chloride flush 0.9 % injection 10 mL  10 mL Intravenous 2 times per day Kylee Buitrago MD   10 mL at 09/08/19 2034    sodium chloride flush 0.9 % injection 10 mL  10 mL Intravenous PRN Kylee Buitrago MD        magnesium hydroxide (MILK OF MAGNESIA) 400 MG/5ML suspension 30 mL  30 mL Oral Daily PRN Kylee Buitrago MD        ondansetron (ZOFRAN) injection 4 mg  4 mg Intravenous Q6H PRN Kylee Buitrago MD   4 mg at 09/05/19 1810    aspirin chewable tablet 81 mg  81 mg Oral Daily Tatiana Wong MD   81 mg at 09/09/19 0827    atorvastatin (LIPITOR) tablet 40 mg  40 mg Oral Nightly Tatiana Bray MD   40 mg at 09/09/19 2127    metoprolol tartrate (LOPRESSOR) tablet 50 mg  50 mg Oral BID Kylee Buitrago MD   50 mg at 09/09/19 2127       Allergies: Other and Sulfa antibiotics    Social History:   reports that she has never smoked. She has never used smokeless tobacco. She reports that she does not drink alcohol or use drugs. Family History: family history includes Heart Attack in her father; Heart Disease in her father; Other in her father and mother; Stroke in her father. REVIEW OF SYSTEMS:      Constitutional: No fever or chills.  No night sweats, positive weight loss   Eyes: No eye discharge, double vision, or eye pain   HEENT: negative for sore mouth, sore throat, hoarseness and voice change   Respiratory: negative for cough , sputum, dyspnea, wheezing, hemoptysis, chest pain   Cardiovascular: negative for chest pain, dyspnea, palpitations, orthopnea, PND   Gastrointestinal: negative for nausea, vomiting, diarrhea, constipation, abdominal pain, Dysphagia, hematemesis and hematochezia   Genitourinary: negative for frequency, dysuria, nocturia, urinary incontinence, and hematuria   Integument: negative for rash, skin lesions, bruises. Hematologic/Lymphatic: Positive right groin mass  Endocrine: negative for heat or cold intolerance,weight changes, change in bowel habits and hair loss   Musculoskeletal: negative for myalgias, arthralgias, pain, joint swelling,and bone pain   Neurological: negative for headaches, dizziness, seizures, weakness, numbness    PHYSICAL EXAM:        /61   Pulse 71   Temp 98.6 °F (37 °C) (Oral)   Resp 19   Ht 5' 7\" (1.702 m)   Wt 266 lb 15.6 oz (121.1 kg)   SpO2 100%   BMI 41.81 kg/m²    Temp (24hrs), Av.4 °F (36.9 °C), Min:98.1 °F (36.7 °C), Max:98.6 °F (37 °C)      General appearance -not in distress  Mental status - alert and cooperative   Eyes - pupils equal and reactive, extraocular eye movements intact   Ears - bilateral TM's and external ear canals normal   Mouth - mucous membranes moist, pharynx normal without lesions   Neck - supple, no significant adenopathy   Lymphatics -5-6 cm right groin mass. Chest - clear to auscultation, no wheezes, rales or rhonchi, symmetric air entry   Heart - normal rate, regular rhythm, normal S1, S2, no murmurs  Abdomen - soft, nontender, nondistended, no masses or organomegaly   Neurological - alert, oriented, normal speech, no focal findings or movement disorder noted   Musculoskeletal - no joint tenderness, deformity or swelling   Extremities - peripheral pulses normal, no pedal edema, no clubbing or cyanosis   Skin - normal coloration and turgor, no rashes, no suspicious skin lesions noted ,      DATA:      Labs:     CBC:   Recent Labs     19  2334 09/10/19  0457   WBC 7.2  --   --  7.6   HGB 7.3*   < > 7.6* 7.2*   HCT 24.2*   < > 24.1* 24.0*     --   --  279    < > = values in this interval not displayed.      BMP:   Recent Labs     09/09/19  0517 09/10/19  0457    135   K 3.8 4.0   CO2 25 26   BUN 25* 21   CREATININE 0.97* 0.90   LABGLOM 56* >60   GLUCOSE 96 98     PT/INR: No results for input(s): PROTIME, INR in the last 72 hours. APTT:No results for input(s): APTT in the last 72 hours. LIVER PROFILE:No results for input(s): AST, ALT, LABALBU in the last 72 hours. Results for orders placed or performed during the hospital encounter of 09/02/19   Urine culture clean catch   Result Value Ref Range    Specimen Description . CLEAN CATCH URINE     Special Requests NOT REPORTED     Culture NO SIGNIFICANT GROWTH    C.trachomatis N.gonorrhoeae DNA   Result Value Ref Range    Specimen Description . CERVIX     C. trachomatis DNA NEGATIVE NEGATIVE    N. gonorrhoeae DNA NEGATIVE NEGATIVE   VAGINITIS DNA PROBE   Result Value Ref Range    Specimen Description . VAGINA     Special Requests NOT REPORTED     Direct Exam POSITIVE for Gardnerella vaginalis. (A)     Direct Exam NEGATIVE for Candida sp. Direct Exam NEGATIVE for Trichomonas vaginalis     Direct Exam       Method of testing is a DNA probe intended for detection and identification of Candida species, Gardnerella vaginalis, and Trichomonas vaginalis nucleic acid in vaginal fluid specimens from patients with symptoms of vaginitis/vaginosis.    Urinalysis Reflex to Culture   Result Value Ref Range    Color, UA YELLOW YELLOW    Turbidity UA CLOUDY (A) CLEAR    Glucose, Ur NEGATIVE NEGATIVE    Bilirubin Urine NEGATIVE NEGATIVE    Ketones, Urine NEGATIVE NEGATIVE    Specific Gravity, UA 1.010 1.005 - 1.030    Urine Hgb NEGATIVE NEGATIVE    pH, UA 5.0 5.0 - 8.0    Protein, UA NEGATIVE NEGATIVE    Urobilinogen, Urine Normal Normal    Nitrite, Urine NEGATIVE NEGATIVE    Leukocyte Esterase, Urine SMALL (A) NEGATIVE    Urinalysis Comments NOT REPORTED    CBC Auto Differential   Result Value Ref Range    WBC 7.5 3.5 - 11.3 k/uL    RBC 3.51 (L) 3.95 - 5.11 m/uL    Hemoglobin 10.8 (L) 11.9 - 15.1 g/dL    Hematocrit 35.0 (L) 36.3 - 47.1 %    MCV 99.7 82.6 - 102.9 fL    MCH 30.8 25.2 - 33.5 pg    MCHC 30.9 28.4 - 34.8 measured Ultrasound Findings: Visibility of the uterus is significantly limited due to limitations related to the patient's body habitus, bowel gas, and possible shadowing related to uterine calcifications. Uterus: Uterus is not well visualized. Endometrial stripe: The endometrial stripe is not well visualized. The suspected endometrial tissue is measuring up to 2.6 cm in thickness on transabdominal images. There is no significant vascular flow seen within the endometrial tissue. Right Ovary: Right ovary was not seen. Left Ovary:  Left ovary was not seen. Free Fluid: There is a moderate amount of free fluid in the pelvis. 1.  Essentially nondiagnostic study due to significantly limited visibility of the uterus and endometrial tissue. On transabdominal images, the endometrial tissue appears to measure up to 2.6 cm in thickness, which is abnormal in a patient of this age. Consider additional evaluation with MRI or short-term follow-up pelvic ultrasound. Ovaries were not seen. 2.  There is a moderate amount of free fluid seen in the pelvis, which is nonspecific. Primary Problem  CHF (congestive heart failure), NYHA class I, acute on chronic, combined [facility])    Active Hospital Problems    Diagnosis Date Noted    Inguinal lymphadenopathy [R59.0]     Congestive heart failure (HCC) [I50.9]     Postmenopausal bleeding [N95.0]     Weakness of both lower limbs [R29.898]     CHF (congestive heart failure), NYHA class I, acute on chronic, combined ([facility] [address] [I50.43] 09/02/2019    Morbid obesity ([facility] [address] [E66.01] 06/12/2019    Hypertension [I10]     Arthritis [M19.90]          RECOMMENDATIONS:  Personally reviewed results of lab work-up and other relevant clinical data. CT chest shows pulmonary embolism. Given vaginal bleeding.   Plan is to place IVC filter  Patient also scheduled to have biopsy of inguinal mass  Patient on antiplatelet therapy due to recent drug-eluting stent placement  Continue to monitor

## 2019-09-11 NOTE — ANESTHESIA PRE PROCEDURE
Department of Anesthesiology  Preprocedure Note       Name:  Tanner Thomas   Age:  75 y.o.  :  1945                                          MRN:  [id number]         Date:  2019      Surgeon: Tiffany Last):  Roxana Guerra MD    Procedure: PELVIC EUA, DILATATION AND CURETTAGE, PAP SMEAR, POLYPECTOMY (N/A )    Medications prior to admission:   Prior to Admission medications    Medication Sig Start Date End Date Taking?  Authorizing Provider   furosemide (LASIX) 20 MG tablet Take 1 tablet by mouth daily as needed (edema, shortness of breath) Take 1 tab daily x 3 days 19   JAY Stein - CNP   spironolactone (ALDACTONE) 25 MG tablet take 2 tablet by mouth once daily 19   Harpreet Cisneros MD   hydrALAZINE (APRESOLINE) 50 MG tablet Take 1 tablet by mouth every 8 hours 19   Rosy Isaac MD   metoprolol tartrate (LOPRESSOR) 50 MG tablet Take 1 tablet by mouth 2 times daily 19   Rosy Isaac MD   NIFEdipine (ADALAT CC) 30 MG extended release tablet Take 1 tablet by mouth daily 19   Rosy Isaac MD   losartan (COZAAR) 50 MG tablet Take 1 tablet by mouth daily 19   Harpreet Cisneros MD   levothyroxine (SYNTHROID) 25 MCG tablet Take 1 tablet by mouth daily 19   Harpreet Cisneros MD       Current medications:    Current Facility-Administered Medications   Medication Dose Route Frequency Provider Last Rate Last Dose    0.9 % sodium chloride bolus  250 mL Intravenous Once Ara Duong MD        0.9 % sodium chloride infusion   Intravenous Continuous Shavonne Roman MD 20 mL/hr at 09/10/19 1817      sodium chloride flush 0.9 % injection 30 mL  30 mL Intravenous PRN Juan Carlos Benavides DO        polyethylene glycol (GLYCOLAX) packet 17 g  17 g Oral Daily David Lockett MD   17 g at 19 0828    metroNIDAZOLE (FLAGYL) tablet 500 mg  500 mg Oral 2 times per day Brett Hammonds DO   500 mg at 19 0945    bumetanide (BUMEX) tablet 1 mg  1 mg Oral Daily Cj FORTE

## 2019-09-11 NOTE — PROGRESS NOTES
yesterday by IR today. Plan for endometrial biopsy today today  Bilateral Doppler ultrasound of the leg: Bilateral acute DVT       OBJECTIVE     Vital Signs:  BP (!) 132/51   Pulse 72   Temp 98.6 °F (37 °C) (Oral)   Resp 20   Ht 5' 7\" (1.702 m)   Wt 276 lb (125.2 kg)   SpO2 99%   BMI 43.23 kg/m²     Temp (24hrs), Av.4 °F (36.9 °C), Min:98.1 °F (36.7 °C), Max:98.6 °F (37 °C)    In: 1223   Out: 1200 [Urine:1200]    Physical Exam:  Constitutional: This is a well developed, well nourished, Greater than 40 - Morbid Obesity / Extreme Obesity / Grade III 75y.o. year old female who is alert, oriented, cooperative and in no apparent distress. Head:normocephalic and atraumatic. EENT:  PERRLA. No conjunctival injections. Neck: Supple without thyromegaly. No elevated JVP. Trachea was midline. Respiratory: Chest was symmetrical without dullness to percussion. Cardiovascular: Regular without murmur, clicks, gallops or rubs. Abdomen: Slightly rounded and soft without organomegaly. No rebound, rigidity or guarding was appreciated. Lymphatic: No lymphadenopathy. Musculoskeletal: Normal curvature of the spine. No gross muscle weakness. Extremities:   lower extremity edema No ulcerations, tenderness, varicosities or erythema. Muscle size, tone and strength are normal.  No involuntary movements are noted. Skin:  Warm and dry. Good color, turgor and pigmentation. No lesions or scars.   No cyanosis or clubbing  Neurological/Psychiatric: The patient's general behavior, level of consciousness, thought content and emotional status is normal.        Medications:  Scheduled Medications:    sodium chloride  250 mL Intravenous Once    polyethylene glycol  17 g Oral Daily    metroNIDAZOLE  500 mg Oral 2 times per day    bumetanide  1 mg Oral Daily    sodium chloride flush  10 mL Intravenous 2 times per day    ticagrelor  90 mg Oral BID    levothyroxine  50 mcg Oral Daily    spironolactone  25 mg thickened and there are bilateral adnexal masses, right greater than left. Large enhancing masses are seen throughout the abdomen/pelvis and there is omental caking along with bilateral inguinal lymphadenopathy and moderate ascites. Evaluation is limited due to extensive artifact. Suggest correlation with contrast-enhanced CT of the abdomen/pelvis. Us Non Ob Transvaginal    Result Date: 9/9/2019  1. Essentially nondiagnostic study due to significantly limited visibility of the uterus and endometrial tissue. On transabdominal images, the endometrial tissue appears to measure up to 2.6 cm in thickness, which is abnormal in a patient of this age. Consider additional evaluation with MRI or short-term follow-up pelvic ultrasound. Ovaries were not seen. 2.  There is a moderate amount of free fluid seen in the pelvis, which is nonspecific. Ct Chest Abdomen Pelvis W Contrast    Result Date: 9/9/2019  Pulmonary emboli are noted bilaterally. There are no definitive findings that are diagnostic of right heart strain Bronchiectatic changes are noted. Punctate parenchymal nodular densities are noted, too small to characterize Abdomen and pelvis: Extensive adenopathy likely due to lymphoma or metastatic disease Ascites Low-density in the left lobe of the liver may represent artifact. However a developing low-density lesion would be difficult to exclude given the other findings in the abdomen and pelvis. Nonspecific heterogeneity of the uterus. Detail in this region is limited due to artifact. Right greater than left inguinal adenopathy. Bilateral renal calculi without hydronephrosis. Biopsy and/or PET scan recommended Critical results were called by Dr. Nidhi Gatica to Dr. Darinel hTomas on 9/9/2019 at 22:38.        ASSESSMENT & PLAN     ASSESSMENT / PLAN:     Principal Problem:    Bilateral pulmonary embolism:  IVC filter placed yesterday by IR  Risk for bleeding given prior episode of vaginal bleeding, so

## 2019-09-11 NOTE — PROGRESS NOTES
Gynecology Oncology Progress Note      Orquidea Ayala is a 75 y.o. female     Patient seen and examined. She is resting comfortably in bed without complaints. Pain is controlled. Patient is  tolerating oral intake. She is urinating well. She denies any vaginal bleeding. She is  Ambulating with assistance. She is  passing flatus. She denies Fever/Chills, Chest Pain, SOB, N/V, Calf Pain. Vitals:  Vitals:    09/11/19 0730 09/11/19 0800 09/11/19 0830 09/11/19 0937   BP: (!) 133/48 (!) 122/45 108/78    Pulse: 74 66 76 88   Resp: 20 19 20 20   Temp:       TempSrc:       SpO2:    97%   Weight:       Height:             Intake/Output:   Last Shift: I/O last 3 completed shifts: In: 9186 [P.O.:250; I.V.:973; Blood:350]  Out: 1200 [Urine:1200]  Current Shift: No intake/output data recorded.       Physical Exam:  General: Alert and oriented, no acute distress  HEENT: normocephalic, atraumatic, supple, symmetrical, trachea midline, Pupils equal and reactive to light, Extraocular muscles intact, sclera non icteric  Respiratory: Decreased air movement bilaterally, unlabored respirations, no wheezes or rhonchi  Cardiovascular: Regular rate and rhythm, no murmurs rubs or gallops  Abdomen:  soft, non tender, non distended, no rebound, guarding, or rigidity, bowel sounds normoactive  Incisions: Right neck and groin dressings C/D/I  Extremities: No LE edema, no calf tenderness or swelling, EPCs not on  Psych: affect appropriate      Lab:  Recent Results (from the past 12 hour(s))   HEMOGLOBIN AND HEMATOCRIT, BLOOD    Collection Time: 09/11/19 12:14 AM   Result Value Ref Range    Hemoglobin 8.3 (L) 11.9 - 15.1 g/dL    Hematocrit 26.6 (L) 36.3 - 47.1 %   Basic Metabolic Panel w/ Reflex to MG    Collection Time: 09/11/19  4:27 AM   Result Value Ref Range    Glucose 98 70 - 99 mg/dL    BUN 20 8 - 23 mg/dL    CREATININE 0.78 0.50 - 0.90 mg/dL    Bun/Cre Ratio NOT REPORTED 9 - 20    Calcium 7.7 (L) 8.6 - 10.4 mg/dL    Sodium 138 135 - thickened endometrium with bilateral adnexal masses, omental caking, bilateral inguinal lymphadenopathy and moderate ascites.               -Hemoglobin and hematocrit every 6 hours per primary team.   - S/p 1 unit PRBCs on 9/10/19              - CBC, BMP daily   - Hgb 8.4 today, minimal amount of vaginal bleeding does not explain the patient's anemia, recommend evaluating other causes of anemia                - Unable to obtain endometrial biopsy at the bedside   - Cervical polyps on bedside exam, possibly explain light vaginal bleeding    - Plan for [facility]  in the OR tomorrow afternoon. Hold all anticoagulation and antiplatelet therapy at this time.     - Will obtain surgical consent and preop CBC, type and screen, PT-INR, PTT, and BMP     Thickened endometrial lining, bilateral adnexal masses, omental caking, moderate ascites              -  elevated at 2780              - CEA elevated at 25.1              - CA 19-9 elevated at 2309              - CT Chest/Abd/Pelvis - Pulmonary emboli bilaterally, extensive adenopathy, ascites               - S/p IR placement of IVC filter and right inguinal lymph node biopsy              - Oncology consulted               - Patient is not a surgical candidate.      Bilateral DVTs, PE   - LE venous dopplers 9/10/19: Acute DVT of R femoral, superficial femoral, tibial and peroneal veins and acute DVT of left popliteal, tibial and peroneal veins   - S/p IVC filter placement 9/10/19   - Management per primary      Bacterial vaginosis              -Flagyl 500 mg twice daily x7 days     Acute on chronic diastolic CHF              -S/p cardiac catheterization on 9/5/2019.  2 stents placed. Donte Rolls started on Brilinta due to drug-eluting stent.              -Management per cardiology and primary team.     Hypertension              - Management per primary     Hypothyroidism              - Synthroid 50 mcg daily     Obesity              -BMI 41.9      Principal Problem:    CHF

## 2019-09-11 NOTE — PROGRESS NOTES
< > = values in this interval not displayed. BMP:   Recent Labs     09/10/19  0457 09/11/19  0427    138   K 4.0 4.1   CO2 26 26   BUN 21 20   CREATININE 0.90 0.78   LABGLOM >60 >60   GLUCOSE 98 98     PT/INR: No results for input(s): PROTIME, INR in the last 72 hours. APTT:No results for input(s): APTT in the last 72 hours. LIVER PROFILE:No results for input(s): AST, ALT, LABALBU in the last 72 hours. Results for orders placed or performed during the hospital encounter of 09/02/19   Urine culture clean catch   Result Value Ref Range    Specimen Description . CLEAN CATCH URINE     Special Requests NOT REPORTED     Culture NO SIGNIFICANT GROWTH    C.trachomatis N.gonorrhoeae DNA   Result Value Ref Range    Specimen Description . CERVIX     C. trachomatis DNA NEGATIVE NEGATIVE    N. gonorrhoeae DNA NEGATIVE NEGATIVE   VAGINITIS DNA PROBE   Result Value Ref Range    Specimen Description . VAGINA     Special Requests NOT REPORTED     Direct Exam POSITIVE for Gardnerella vaginalis. (A)     Direct Exam NEGATIVE for Candida sp. Direct Exam NEGATIVE for Trichomonas vaginalis     Direct Exam       Method of testing is a DNA probe intended for detection and identification of Candida species, Gardnerella vaginalis, and Trichomonas vaginalis nucleic acid in vaginal fluid specimens from patients with symptoms of vaginitis/vaginosis.    Urinalysis Reflex to Culture   Result Value Ref Range    Color, UA YELLOW YELLOW    Turbidity UA CLOUDY (A) CLEAR    Glucose, Ur NEGATIVE NEGATIVE    Bilirubin Urine NEGATIVE NEGATIVE    Ketones, Urine NEGATIVE NEGATIVE    Specific Gravity, UA 1.010 1.005 - 1.030    Urine Hgb NEGATIVE NEGATIVE    pH, UA 5.0 5.0 - 8.0    Protein, UA NEGATIVE NEGATIVE    Urobilinogen, Urine Normal Normal    Nitrite, Urine NEGATIVE NEGATIVE    Leukocyte Esterase, Urine SMALL (A) NEGATIVE    Urinalysis Comments NOT REPORTED    CBC Auto Differential   Result Value Ref Range    WBC 7.5 3.5 - 11.3 k/uL RBC 3.51 (L) 3.95 - 5.11 m/uL    Hemoglobin 10.8 (L) 11.9 - 15.1 g/dL    Hematocrit 35.0 (L) 36.3 - 47.1 %    MCV 99.7 82.6 - 102.9 fL    MCH 30.8 25.2 - 33.5 pg    MCHC 30.9 28.4 - 34.8 g/dL    RDW 15.6 (H) 11.8 - 14.4 %    Platelets 373 752 - 643 k/uL    MPV 9.6 8.1 - 13.5 fL    NRBC Automated 0.0 0.0 per 100 WBC    Differential Type NOT REPORTED     Seg Neutrophils 77 (H) 36 - 65 %    Lymphocytes 14 (L) 24 - 43 %    Monocytes 7 3 - 12 %    Eosinophils % 1 1 - 4 %    Basophils 1 0 - 2 %    Immature Granulocytes 0 0 %    Segs Absolute 5.81 1.50 - 8.10 k/uL    Absolute Lymph # 1.01 (L) 1.10 - 3.70 k/uL    Absolute Mono # 0.49 0.10 - 1.20 k/uL    Absolute Eos # 0.08 0.00 - 0.44 k/uL    Basophils Absolute 0.04 0.00 - 0.20 k/uL    Absolute Immature Granulocyte 0.03 0.00 - 0.30 k/uL    WBC Morphology NOT REPORTED     RBC Morphology ANISOCYTOSIS PRESENT     Platelet Estimate NOT REPORTED    BASIC METABOLIC PANEL   Result Value Ref Range    Glucose 117 (H) 70 - 99 mg/dL    BUN 23 8 - 23 mg/dL    CREATININE 0.85 0.50 - 0.90 mg/dL    Bun/Cre Ratio NOT REPORTED 9 - 20    Calcium 8.5 (L) 8.6 - 10.4 mg/dL    Sodium 135 135 - 144 mmol/L    Potassium 4.4 3.7 - 5.3 mmol/L    Chloride 100 98 - 107 mmol/L    CO2 22 20 - 31 mmol/L    Anion Gap 13 9 - 17 mmol/L    GFR Non-African American >60 >60 mL/min    GFR African American >60 >60 mL/min    GFR Comment          GFR Staging NOT REPORTED    Troponin   Result Value Ref Range    Troponin, High Sensitivity 55 (HH) 0 - 14 ng/L    Troponin T NOT REPORTED <0.03 ng/mL    Troponin Interp NOT REPORTED    Troponin   Result Value Ref Range    Troponin, High Sensitivity 56 (HH) 0 - 14 ng/L    Troponin T NOT REPORTED <0.03 ng/mL    Troponin Interp NOT REPORTED    TSH with Reflex   Result Value Ref Range    TSH 6.76 (H) 0.30 - 5.00 mIU/L   Brain Natriuretic Peptide   Result Value Ref Range    Pro-BNP 9,240 (H) <300 pg/mL    BNP Interpretation Pro-BNP Reference Range:    Hemoglobin and hematocrit, blood   Result Value Ref Range    POC Hemoglobin 10.9 (L) 12.0 - 16.0 g/dL    POC Hematocrit 32 (L) 36 - 46 %   SODIUM (POC)   Result Value Ref Range    POC Sodium 136 (L) 138 - 146 mmol/L   POTASSIUM (POC)   Result Value Ref Range    POC Potassium 4.2 3.5 - 4.5 mmol/L   CHLORIDE (POC)   Result Value Ref Range    POC Chloride 102 98 - 107 mmol/L   CALCIUM, IONIC (POC)   Result Value Ref Range    POC Ionized Calcium 1.13 (L) 1.15 - 1.33 mmol/L   T4, Free   Result Value Ref Range    Thyroxine, Free 1.34 0.93 - 1.70 ng/dL   CK   Result Value Ref Range    Total CK 49 26 - 192 U/L   Iron and TIBC   Result Value Ref Range    Iron 29 (L) 37 - 145 ug/dL    TIBC 170 (L) 250 - 450 ug/dL    Iron Saturation 17 (L) 20 - 55 %    UIBC 141 112 - 347 ug/dL   Ferritin   Result Value Ref Range    Ferritin 106 13 - 150 ug/L   RETICULOCYTES   Result Value Ref Range    Retic % 3.3 (H) 0.5 - 1.9 %    Absolute Retic # 0.110 (H) 0.030 - 0.080 M/uL    Immature Retic Fract 28.100 (H) 2.7 - 18.3 %    Retic Hemoglobin 34.1 28.2 - 35.7 pg   Troponin   Result Value Ref Range    Troponin, High Sensitivity 53 (HH) 0 - 14 ng/L    Troponin T NOT REPORTED <0.03 ng/mL    Troponin Interp NOT REPORTED    Troponin   Result Value Ref Range    Troponin, High Sensitivity 54 (HH) 0 - 14 ng/L    Troponin T NOT REPORTED <0.03 ng/mL    Troponin Interp NOT REPORTED    Microscopic Urinalysis   Result Value Ref Range    -          WBC, UA 10 TO 20 0 - 5 /HPF    RBC, UA 2 TO 5 0 - 4 /HPF    Casts UA  0 - 8 /LPF    Crystals UA NOT REPORTED None /HPF    Epithelial Cells UA 2 TO 5 0 - 5 /HPF    Renal Epithelial, Urine NOT REPORTED 0 /HPF    Bacteria, UA NOT REPORTED None    Mucus, UA NOT REPORTED None    Trichomonas, UA NOT REPORTED None    Amorphous, UA NOT REPORTED None    Other Observations UA NOT REPORTED NOT REQ.     Yeast, UA NOT REPORTED None   Basic Metabolic Panel w/ Reflex to MG   Result Value Ref Range    Glucose 106 (H) 70 - 99 mg/dL    BUN 17 8 Neutrophils 77 (H) 36 - 65 %    Lymphocytes 14 (L) 24 - 43 %    Monocytes 7 3 - 12 %    Eosinophils % 2 1 - 4 %    Basophils 0 0 - 2 %    Immature Granulocytes 0 0 %    Segs Absolute 6.05 1.50 - 8.10 k/uL    Absolute Lymph # 1.07 (L) 1.10 - 3.70 k/uL    Absolute Mono # 0.54 0.10 - 1.20 k/uL    Absolute Eos # 0.12 0.00 - 0.44 k/uL    Basophils Absolute 0.03 0.00 - 0.20 k/uL    Absolute Immature Granulocyte 0.03 0.00 - 0.30 k/uL   Basic Metabolic Panel   Result Value Ref Range    Glucose 97 70 - 99 mg/dL    BUN 28 (H) 8 - 23 mg/dL    CREATININE 0.92 (H) 0.50 - 0.90 mg/dL    Bun/Cre Ratio  9 - 20    Calcium 7.8 (L) 8.6 - 10.4 mg/dL    Sodium 135 135 - 144 mmol/L    Potassium 4.0 3.7 - 5.3 mmol/L    Chloride 99 98 - 107 mmol/L    CO2 25 20 - 31 mmol/L    Anion Gap 11 9 - 17 mmol/L    GFR Non-African American 60 (L) >60 mL/min    GFR African American >60 >60 mL/min    GFR Comment          GFR Staging        Result Value Ref Range     2780 (H) <38 U/mL   CEA   Result Value Ref Range    CEA 25.1 (H) <3.9 ng/mL   CANCER ANTIGEN 19-9   Result Value Ref Range    CA 19-9 2309 (H) 0 - 35 U/mL   HEMOGLOBIN AND HEMATOCRIT, BLOOD   Result Value Ref Range    Hemoglobin 7.1 (L) 11.9 - 15.1 g/dL    Hematocrit 23.0 (L) 36.3 - 47.1 %   Basic Metabolic Panel w/ Reflex to MG   Result Value Ref Range    Glucose 96 70 - 99 mg/dL    BUN 25 (H) 8 - 23 mg/dL    CREATININE 0.97 (H) 0.50 - 0.90 mg/dL    Bun/Cre Ratio NOT REPORTED 9 - 20    Calcium 7.7 (L) 8.6 - 10.4 mg/dL    Sodium 136 135 - 144 mmol/L    Potassium 3.8 3.7 - 5.3 mmol/L    Chloride 102 98 - 107 mmol/L    CO2 25 20 - 31 mmol/L    Anion Gap 9 9 - 17 mmol/L    GFR Non-African American 56 (L) >60 mL/min    GFR African American >60 >60 mL/min    GFR Comment          GFR Staging NOT REPORTED    CBC WITH AUTO DIFFERENTIAL   Result Value Ref Range    WBC 7.2 3.5 - 11.3 k/uL    RBC 2.28 (L) 3.95 - 5.11 m/uL    Hemoglobin 7.3 (L) 11.9 - 15.1 g/dL    Hematocrit 24.2 (L) 36.3 - 19.     FMC7               6       20. Kappa               5       21. Lambda               4           This test was develo ped and its performance characteristics determined  by [facility]. It has not  been cleared or approved by the U.[place]. Food and Drug Administration. The FDA does not require this test to go through premarket FDA review. This test is used for clinical purposes. It should not be regarded  as investigational or for research. This laboratory is certified  under the [address] (CLIA) as  qualified to perform high complexity clinical laboratory testing. Esme Smallwood M.D.                    Source:  1: PERIPHERAL BLOOD FOR FLOW CYTOMETRY     Venous Blood Gas, POC   Result Value Ref Range    pH, Harry 7.460 (H) 7.320 - 7.430    pCO2, Harry 31.5 (L) 41.0 - 51.0 mm Hg    pO2, Harry 36.9 30.0 - 50.0 mm Hg    HCO3, Venous 22.4 22.0 - 29.0 mmol/L    Total CO2, Venous 23 23.0 - 30.0 mmol/L    Negative Base Excess, Harry 1 0.0 - 2.0    Positive Base Excess, Harry NOT REPORTED 0.0 - 3.0    O2 Sat, Harry 75 60.0 - 85.0 %    O2 Device/Flow/% NOT REPORTED     Neno Test NOT REPORTED     Sample Site NOT REPORTED     Mode NOT REPORTED     FIO2 NOT REPORTED     Pt Temp NOT REPORTED     POC pH Temp NOT REPORTED     POC pCO2 Temp NOT REPORTED mm Hg    POC pO2 Temp NOT REPORTED mm Hg   Creatinine W/GFR Point of Care   Result Value Ref Range    POC Creatinine 0.91 0.51 - 1.19 mg/dL    GFR Comment >60 >60 mL/min    GFR Non-African American >60 >60 mL/min    GFR Comment         Lactic Acid, POC   Result Value Ref Range    POC Lactic Acid 1.33 0.56 - 1.39 mmol/L   POCT Glucose   Result Value Ref Range    POC Glucose 114 (H) 74 - 100 mg/dL   Anion Gap (Calc) POC   Result Value Ref Range    Anion Gap 12 7 - 16 mmol/L   POC Glucose Fingerstick   Result Value Ref Range    POC Glucose 123 (H) 65 - 105 mg/dL   EKG 12 Lead   Result Value

## 2019-09-12 PROBLEM — Z98.890 S/P D&C (STATUS POST DILATION AND CURETTAGE): Status: ACTIVE | Noted: 2019-01-01

## 2019-09-12 NOTE — PROGRESS NOTES
Today's Date: 9/12/2019  Patient Name: Teri Paul  Date of admission: 9/2/2019  8:32 AM  Patient's age: 75 y.o., 1945  Admission Dx: CHF (congestive heart failure), NYHA class I, acute on chronic, combined ([facility] [address] [I50.43]    Reason for Consult: management recommendations  Requesting Physician: Liza Crowley MD    CHIEF COMPLAINT: Right-sided groin mass. Weight loss. Endometrial thickening. History Obtained From:  patient, electronic medical record    Interval history:    Patient seen and examined. Labs and vitals reviewed. Patient status post pelvic exam.  Awaiting pathology report  No new complaint interval event. Denies fever or chills. HISTORY OF PRESENT ILLNESS:      The patient is a 75 y.o.  female who is admitted to the hospital for chief complaints of shortness of breath and chest pain. Patient underwent cardiac catheterization on 9/5 with placement of 2 stents. Post cardiac catheterization patient was started on Brilinta. Patient noted to have provisional bleeding subsequently underwent further evaluation including ultrasound which revealed endometrial thickening. Patient also underwent MRI of pelvis which showed endometrial thickening with bilateral adnexal masses omental caking bilateral inguinal lymphadenopathy and moderate ascites. Patient has a palpable large right groin mass which is tennis ball size. Patient was seen by gynecological oncology and is currently under the process of getting CT chest and pelvis soon. Patient does complain of weight loss but says that the weight loss was intentional.  Patient is currently not in any pain. Her BMI is 41.8. Past Medical History:   has a past medical history of Arthritis, CHF (congestive heart failure), NYHA class I, acute on chronic, combined ([facility] [address] Hypertension, Hypothyroid, and Poor circulation.     Past Surgical History:   has a past surgical history that includes Tooth Extraction; Skin tag removal; Eye mg/dL    Sodium 135 135 - 144 mmol/L    Potassium 4.2 3.7 - 5.3 mmol/L    Chloride 102 98 - 107 mmol/L    CO2 25 20 - 31 mmol/L    Anion Gap 8 (L) 9 - 17 mmol/L    GFR Non-African American >60 >60 mL/min    GFR African American >60 >60 mL/min    GFR Comment          GFR Staging NOT REPORTED    BASIC METABOLIC PANEL   Result Value Ref Range    Glucose 99 70 - 99 mg/dL    BUN 14 8 - 23 mg/dL    CREATININE 0.83 0.50 - 0.90 mg/dL    Bun/Cre Ratio NOT REPORTED 9 - 20    Calcium 7.9 (L) 8.6 - 10.4 mg/dL    Sodium 139 135 - 144 mmol/L    Potassium 4.1 3.7 - 5.3 mmol/L    Chloride 104 98 - 107 mmol/L    CO2 24 20 - 31 mmol/L    Anion Gap 11 9 - 17 mmol/L    GFR Non-African American >60 >60 mL/min    GFR African American >60 >60 mL/min    GFR Comment          GFR Staging NOT REPORTED    MAGNESIUM   Result Value Ref Range    Magnesium 2.1 1.6 - 2.6 mg/dL   Troponin   Result Value Ref Range    Troponin, High Sensitivity 41 (H) 0 - 14 ng/L    Troponin T NOT REPORTED <0.03 ng/mL    Troponin Interp NOT REPORTED    Troponin   Result Value Ref Range    Troponin, High Sensitivity 44 (H) 0 - 14 ng/L    Troponin T NOT REPORTED <0.03 ng/mL    Troponin Interp NOT REPORTED    APTT   Result Value Ref Range    PTT 33.9 (H) 20.5 - 30.5 sec   CBC WITH AUTO DIFFERENTIAL   Result Value Ref Range    WBC 13.6 (H) 3.5 - 11.3 k/uL    RBC 3.34 (L) 3.95 - 5.11 m/uL    Hemoglobin 10.3 (L) 11.9 - 15.1 g/dL    Hematocrit 33.5 (L) 36.3 - 47.1 %    .3 82.6 - 102.9 fL    MCH 30.8 25.2 - 33.5 pg    MCHC 30.7 28.4 - 34.8 g/dL    RDW 15.2 (H) 11.8 - 14.4 %    Platelets 399 965 - 738 k/uL    MPV 9.5 8.1 - 13.5 fL    NRBC Automated 0.0 0.0 per 100 WBC    Differential Type NOT REPORTED     WBC Morphology NOT REPORTED     RBC Morphology ANISOCYTOSIS PRESENT     Platelet Estimate NOT REPORTED     Seg Neutrophils 88 (H) 36 - 65 %    Lymphocytes 6 (L) 24 - 43 %    Monocytes 5 3 - 12 %    Eosinophils % 0 (L) 1 - 4 %    Basophils 0 0 - 2 %    Immature Granulocytes 0 0 %    Segs Absolute 11.95 (H) 1.50 - 8.10 k/uL    Absolute Lymph # 0.84 (L) 1.10 - 3.70 k/uL    Absolute Mono # 0.69 0.10 - 1.20 k/uL    Absolute Eos # <0.03 0.00 - 0.44 k/uL    Basophils Absolute 0.05 0.00 - 0.20 k/uL    Absolute Immature Granulocyte 0.05 0.00 - 0.30 k/uL   Basic Metabolic Panel w/ Reflex to MG   Result Value Ref Range    Glucose 130 (H) 70 - 99 mg/dL    BUN 20 8 - 23 mg/dL    CREATININE 1.03 (H) 0.50 - 0.90 mg/dL    Bun/Cre Ratio NOT REPORTED 9 - 20    Calcium 8.0 (L) 8.6 - 10.4 mg/dL    Sodium 137 135 - 144 mmol/L    Potassium 4.4 3.7 - 5.3 mmol/L    Chloride 101 98 - 107 mmol/L    CO2 24 20 - 31 mmol/L    Anion Gap 12 9 - 17 mmol/L    GFR Non-African American 52 (L) >60 mL/min    GFR African American >60 >60 mL/min    GFR Comment          GFR Staging NOT REPORTED    CBC   Result Value Ref Range    WBC 10.2 3.5 - 11.3 k/uL    RBC 2.83 (L) 3.95 - 5.11 m/uL    Hemoglobin 8.7 (L) 11.9 - 15.1 g/dL    Hematocrit 28.2 (L) 36.3 - 47.1 %    MCV 99.6 82.6 - 102.9 fL    MCH 30.7 25.2 - 33.5 pg    MCHC 30.9 28.4 - 34.8 g/dL    RDW 15.3 (H) 11.8 - 14.4 %    Platelets 018 840 - 408 k/uL    MPV 9.3 8.1 - 13.5 fL    NRBC Automated 0.0 0.0 per 100 WBC   Protime-INR   Result Value Ref Range    Protime 13.3 (H) 9.0 - 12.0 sec    INR 1.3    EOSINOPHILS, URINE   Result Value Ref Range    Eosinophil, Ur NONE SEEN NONE SEEN   BASIC METABOLIC PANEL   Result Value Ref Range    Glucose 127 (H) 70 - 99 mg/dL    BUN 27 (H) 8 - 23 mg/dL    CREATININE 1.29 (H) 0.50 - 0.90 mg/dL    Bun/Cre Ratio NOT REPORTED 9 - 20    Calcium 8.3 (L) 8.6 - 10.4 mg/dL    Sodium 136 135 - 144 mmol/L    Potassium 4.2 3.7 - 5.3 mmol/L    Chloride 100 98 - 107 mmol/L    CO2 22 20 - 31 mmol/L    Anion Gap 14 9 - 17 mmol/L    GFR Non-African American 40 (L) >60 mL/min    GFR  49 (L) >60 mL/min    GFR Comment          GFR Staging NOT REPORTED    HEMOGLOBIN AND HEMATOCRIT, BLOOD   Result Value Ref Range    Hemoglobin 9.1 (L) 11.9 - 15.1 g/dL    Hematocrit 28.8 (L) 36.3 - 47.1 %   HEMOGLOBIN AND HEMATOCRIT, BLOOD   Result Value Ref Range    Hemoglobin 9.2 (L) 11.9 - 15.1 g/dL    Hematocrit 29.2 (L) 36.3 - 47.1 %   HEMOGLOBIN AND HEMATOCRIT, BLOOD   Result Value Ref Range    Hemoglobin 8.0 (L) 11.9 - 15.1 g/dL    Hematocrit 26.0 (L) 36.3 - 47.1 %   HEMOGLOBIN AND HEMATOCRIT, BLOOD   Result Value Ref Range    Hemoglobin 7.9 (L) 11.9 - 15.1 g/dL    Hematocrit 25.4 (L) 36.3 - 47.1 %   HEMOGLOBIN AND HEMATOCRIT, BLOOD   Result Value Ref Range    Hemoglobin 8.2 (L) 11.9 - 15.1 g/dL    Hematocrit 26.1 (L) 36.3 - 47.1 %   HEMOGLOBIN AND HEMATOCRIT, BLOOD   Result Value Ref Range    Hemoglobin 8.0 (L) 11.9 - 15.1 g/dL    Hematocrit 25.9 (L) 36.3 - 47.1 %   HEMOGLOBIN AND HEMATOCRIT, BLOOD   Result Value Ref Range    Hemoglobin 7.5 (L) 11.9 - 15.1 g/dL    Hematocrit 24.1 (L) 36.3 - 47.1 %   HEMOGLOBIN AND HEMATOCRIT, BLOOD   Result Value Ref Range    Hemoglobin 7.8 (L) 11.9 - 15.1 g/dL    Hematocrit 25.3 (L) 36.3 - 47.1 %   HEMOGLOBIN AND HEMATOCRIT, BLOOD   Result Value Ref Range    Hemoglobin 7.9 (L) 11.9 - 15.1 g/dL    Hematocrit 25.2 (L) 36.3 - 47.1 %   HEMOGLOBIN AND HEMATOCRIT, BLOOD   Result Value Ref Range    Hemoglobin 8.2 (L) 11.9 - 15.1 g/dL    Hematocrit 27.1 (L) 36.3 - 47.1 %   CBC WITH AUTO DIFFERENTIAL   Result Value Ref Range    WBC 7.8 3.5 - 11.3 k/uL    RBC 2.49 (L) 3.95 - 5.11 m/uL    Hemoglobin 7.9 (L) 11.9 - 15.1 g/dL    Hematocrit 25.5 (L) 36.3 - 47.1 %    .4 82.6 - 102.9 fL    MCH 31.7 25.2 - 33.5 pg    MCHC 31.0 28.4 - 34.8 g/dL    RDW 15.9 (H) 11.8 - 14.4 %    Platelets 859 021 - 566 k/uL    MPV 9.6 8.1 - 13.5 fL    NRBC Automated 0.0 0.0 per 100 WBC    Differential Type NOT REPORTED     WBC Morphology NOT REPORTED     RBC Morphology ANISOCYTOSIS PRESENT     Platelet Estimate NOT REPORTED     Seg Neutrophils 77 (H) 36 - 65 %    Lymphocytes 14 (L) 24 - 43 %    Monocytes 7 3 - 12 %    Eosinophils % 2 1 - 4 % Basophils 0 0 - 2 %    Immature Granulocytes 0 0 %    Segs Absolute 6.05 1.50 - 8.10 k/uL    Absolute Lymph # 1.07 (L) 1.10 - 3.70 k/uL    Absolute Mono # 0.54 0.10 - 1.20 k/uL    Absolute Eos # 0.12 0.00 - 0.44 k/uL    Basophils Absolute 0.03 0.00 - 0.20 k/uL    Absolute Immature Granulocyte 0.03 0.00 - 0.30 k/uL   Basic Metabolic Panel   Result Value Ref Range    Glucose 97 70 - 99 mg/dL    BUN 28 (H) 8 - 23 mg/dL    CREATININE 0.92 (H) 0.50 - 0.90 mg/dL    Bun/Cre Ratio  9 - 20    Calcium 7.8 (L) 8.6 - 10.4 mg/dL    Sodium 135 135 - 144 mmol/L    Potassium 4.0 3.7 - 5.3 mmol/L    Chloride 99 98 - 107 mmol/L    CO2 25 20 - 31 mmol/L    Anion Gap 11 9 - 17 mmol/L    GFR Non-African American 60 (L) >60 mL/min    GFR African American >60 >60 mL/min    GFR Comment          GFR Staging        Result Value Ref Range     2780 (H) <38 U/mL   CEA   Result Value Ref Range    CEA 25.1 (H) <3.9 ng/mL   CANCER ANTIGEN 19-9   Result Value Ref Range    CA 19-9 2309 (H) 0 - 35 U/mL   HEMOGLOBIN AND HEMATOCRIT, BLOOD   Result Value Ref Range    Hemoglobin 7.1 (L) 11.9 - 15.1 g/dL    Hematocrit 23.0 (L) 36.3 - 47.1 %   Basic Metabolic Panel w/ Reflex to MG   Result Value Ref Range    Glucose 96 70 - 99 mg/dL    BUN 25 (H) 8 - 23 mg/dL    CREATININE 0.97 (H) 0.50 - 0.90 mg/dL    Bun/Cre Ratio NOT REPORTED 9 - 20    Calcium 7.7 (L) 8.6 - 10.4 mg/dL    Sodium 136 135 - 144 mmol/L    Potassium 3.8 3.7 - 5.3 mmol/L    Chloride 102 98 - 107 mmol/L    CO2 25 20 - 31 mmol/L    Anion Gap 9 9 - 17 mmol/L    GFR Non-African American 56 (L) >60 mL/min    GFR African American >60 >60 mL/min    GFR Comment          GFR Staging NOT REPORTED    CBC WITH AUTO DIFFERENTIAL   Result Value Ref Range    WBC 7.2 3.5 - 11.3 k/uL    RBC 2.28 (L) 3.95 - 5.11 m/uL    Hemoglobin 7.3 (L) 11.9 - 15.1 g/dL    Hematocrit 24.2 (L) 36.3 - 47.1 %    .1 (H) 82.6 - 102.9 fL    MCH 32.0 25.2 - 33.5 pg    MCHC 30.2 28.4 - 34.8 g/dL    RDW 16.1 (H) MPV 9.2 8.1 - 13.5 fL    NRBC Automated 0.0 0.0 per 100 WBC    Differential Type NOT REPORTED     WBC Morphology NOT REPORTED     RBC Morphology ANISOCYTOSIS PRESENT     Platelet Estimate NOT REPORTED     Seg Neutrophils 78 (H) 36 - 65 %    Lymphocytes 12 (L) 24 - 43 %    Monocytes 7 3 - 12 %    Eosinophils % 2 1 - 4 %    Basophils 0 0 - 2 %    Immature Granulocytes 1 (H) 0 %    Segs Absolute 5.91 1.50 - 8.10 k/uL    Absolute Lymph # 0.88 (L) 1.10 - 3.70 k/uL    Absolute Mono # 0.52 0.10 - 1.20 k/uL    Absolute Eos # 0.18 0.00 - 0.44 k/uL    Basophils Absolute 0.03 0.00 - 0.20 k/uL    Absolute Immature Granulocyte 0.04 0.00 - 0.30 k/uL   HEMOGLOBIN AND HEMATOCRIT, BLOOD   Result Value Ref Range    Hemoglobin 8.8 (L) 11.9 - 15.1 g/dL    Hematocrit 27.9 (L) 36.3 - 47.1 %   HEMOGLOBIN AND HEMATOCRIT, BLOOD   Result Value Ref Range    Hemoglobin 8.8 (L) 11.9 - 15.1 g/dL    Hematocrit 29.2 (L) 36.3 - 47.1 %   Flow cytometry leukemia/lymphoma blood   Result Value Ref Range    Flow Cytometry Bl [id number]    HEMOGLOBIN AND HEMATOCRIT, BLOOD   Result Value Ref Range    Hemoglobin 8.3 (L) 11.9 - 15.1 g/dL    Hematocrit 26.6 (L) 36.3 - 47.1 %   Basic Metabolic Panel w/ Reflex to MG   Result Value Ref Range    Glucose 98 70 - 99 mg/dL    BUN 20 8 - 23 mg/dL    CREATININE 0.78 0.50 - 0.90 mg/dL    Bun/Cre Ratio NOT REPORTED 9 - 20    Calcium 7.7 (L) 8.6 - 10.4 mg/dL    Sodium 138 135 - 144 mmol/L    Potassium 4.1 3.7 - 5.3 mmol/L    Chloride 102 98 - 107 mmol/L    CO2 26 20 - 31 mmol/L    Anion Gap 10 9 - 17 mmol/L    GFR Non-African American >60 >60 mL/min    GFR African American >60 >60 mL/min    GFR Comment          GFR Staging NOT REPORTED    CBC WITH AUTO DIFFERENTIAL   Result Value Ref Range    WBC 7.7 3.5 - 11.3 k/uL    RBC 2.68 (L) 3.95 - 5.11 m/uL    Hemoglobin 8.4 (L) 11.9 - 15.1 g/dL    Hematocrit 27.3 (L) 36.3 - 47.1 %    .9 82.6 - 102.9 fL    MCH 31.3 25.2 - 33.5 pg    MCHC 30.8 28.4 - 34.8 g/dL    RDW ABSOLUTE  (K/UL)    WBC (K/uL)     7.6               IMM GRANS          1          0.04        RBC (K/ uL)     2.34               SEGS               78          5.91  HGB (G/dL)     7.2               LYMPHS          12          0.88  HCT (%)     24.0                                     MCV (FL.)     102.6               MONOS          7          0.52  MCH (PG.)     30.8               EOS               2          0.18  MCHC (g/dL)     30.0               BASO                         0.03  RDW (%)     16.4                                          PLT (k/uL)     279  Morphology: Anisocytosis, reactive plasmacytoid lymphocytes                                                                                                    SPECIMEN TYPE:               CYTOCENTRIFUGE DIFFERENTIAL CELL COUNT:    Peripheral Blood               Lymphocytes               11%                           Neut/Mono/Eos               89%  Viability:  97%                          Flow cytometric immunophenotyping analysis is performed on peripheral  blood following RBC lysis procedure. These cells are labeled by  dire t, five color immunostaining procedure, and analyzed on a   flow cytometer.                        % POSITIVE                                    Target Cells                    RESULTS:               (Lymphocytes)    1. CD1a               0                      2.     CD2               88       3. CD3               76       4. CD4               62       5. CD5               74       6. CD7               84       7. CD8               19       8. CD10               0       9.     CD11c               12       10. CD16/56               19       11. CD19               9       12. CD20               8       13. CD22               8       14. CD23               6       15. CD25               1       16. CD45               100       17. CD57               15       18.       PRESENT     Platelet Estimate NOT REPORTED     Seg Neutrophils 75 (H) 36 - 65 %    Lymphocytes 15 (L) 24 - 43 %    Monocytes 7 3 - 12 %    Eosinophils % 2 1 - 4 %    Basophils 1 0 - 2 %    Immature Granulocytes 1 (H) 0 %    Segs Absolute 5.73 1.50 - 8.10 k/uL    Absolute Lymph # 1.15 1.10 - 3.70 k/uL    Absolute Mono # 0.52 0.10 - 1.20 k/uL    Absolute Eos # 0.13 0.00 - 0.44 k/uL    Basophils Absolute 0.04 0.00 - 0.20 k/uL    Absolute Immature Granulocyte 0.05 0.00 - 0.30 k/uL   PROTIME-INR   Result Value Ref Range    Protime 13.4 (H) 9.0 - 12.0 sec    INR 1.3    APTT   Result Value Ref Range    PTT 23.7 20.5 - 30.5 sec   Venous Blood Gas, POC   Result Value Ref Range    pH, Harry 7.460 (H) 7.320 - 7.430    pCO2, Harry 31.5 (L) 41.0 - 51.0 mm Hg    pO2, Harry 36.9 30.0 - 50.0 mm Hg    HCO3, Venous 22.4 22.0 - 29.0 mmol/L    Total CO2, Venous 23 23.0 - 30.0 mmol/L    Negative Base Excess, Harry 1 0.0 - 2.0    Positive Base Excess, Harry NOT REPORTED 0.0 - 3.0    O2 Sat, Harry 75 60.0 - 85.0 %    O2 Device/Flow/% NOT REPORTED     Neno Test NOT REPORTED     Sample Site NOT REPORTED     Mode NOT REPORTED     FIO2 NOT REPORTED     Pt Temp NOT REPORTED     POC pH Temp NOT REPORTED     POC pCO2 Temp NOT REPORTED mm Hg    POC pO2 Temp NOT REPORTED mm Hg   Creatinine W/GFR Point of Care   Result Value Ref Range    POC Creatinine 0.91 0.51 - 1.19 mg/dL    GFR Comment >60 >60 mL/min    GFR Non-African American >60 >60 mL/min    GFR Comment         Lactic Acid, POC   Result Value Ref Range    POC Lactic Acid 1.33 0.56 - 1.39 mmol/L   POCT Glucose   Result Value Ref Range    POC Glucose 114 (H) 74 - 100 mg/dL   Anion Gap (Calc) POC   Result Value Ref Range    Anion Gap 12 7 - 16 mmol/L   POC Glucose Fingerstick   Result Value Ref Range    POC Glucose 123 (H) 65 - 105 mg/dL   EKG 12 Lead   Result Value Ref Range    Ventricular Rate 95 BPM    Atrial Rate 95 BPM    P-R Interval 204 ms    QRS Duration 80 ms    Q-T Interval 364 ms    QTc

## 2019-09-12 NOTE — PROGRESS NOTES
Progress Note    Date: 9/12/2019  Time: 8:22 AM    Ashley Melton is a 75 y.o. female     Patient was seen and examined. She has no complaints currently. Pain is  controlled. Patient is  tolerating oral intake. She is urinating well with assistance. She does not notice any vaginal bleeding. She is  ambulating with assistance. She is passing flatus and having regular bowel movements. She denies Fever/Chills, Chest Pain, SOB, N/V, Calf Pain. Vitals:  Vitals:    09/11/19 0937 09/11/19 1919 09/11/19 2145 09/12/19 0730   BP:  (!) 125/59 (!) 142/60 (!) 144/61   Pulse: 88 71 73 78   Resp: 20 19 20 21   Temp:  97.8 °F (36.6 °C)  98.6 °F (37 °C)   TempSrc:  Oral  Oral   SpO2: 97% 100%  100%   Weight:       Height:           Intake/Output:   Last Shift: I/O last 3 completed shifts: In: 2343 [P.O.:930; I.V.:495]  Out: 525 [Urine:525]  Current Shift: No intake/output data recorded. Physical Exam:  General:  no apparent distress, alert and cooperative  Neurologic:  alert, oriented, normal speech, no focal findings or movement disorder noted  Lungs:  No increased work of breathing, good air exchange, clear to auscultation bilaterally, no crackles or wheezing, 2L O2 by nasal canula in place  Heart: regular rate and rhythm, no murmur noted  Abdomen: Abdomen soft but taught, non-tender. No rebound tenderness, guarding, rigidity. Negative fluid wave.  BS normal. No masses appreciated,  No organomegaly  Incisions:  Right neck and groin dressings C/D/I  Extremities:  no calf tenderness, +1 pitting edema bilaterally on LE, enlarged inguinal lymph nodes bilaterally, right greater than left    Lab:  Recent Results (from the past 24 hour(s))   HEMOGLOBIN AND HEMATOCRIT, BLOOD    Collection Time: 09/11/19 12:04 PM   Result Value Ref Range    Hemoglobin 8.1 (L) 11.9 - 15.1 g/dL    Hematocrit 27.4 (L) 36.3 - 47.1 %   HEMOGLOBIN AND HEMATOCRIT, BLOOD    Collection Time: 09/11/19  7:48 PM   Result Value Ref Range    Hemoglobin 8.5 Collection Time: 09/12/19  5:33 AM   Result Value Ref Range    PTT 23.7 20.5 - 30.5 sec       Assessment/Plan:  Santosh White 75 y.o. female G0                 Postmenopausal Vaginal Bleeding with thickened endometrial lining               - VSS, afebrile              - s/p Cardiac Catheterization requiring 2 stents on 9/5/2019 and was started on Brilinta due to a drug-eluting stent. - Pelvic exam revealed no active bleeding but was a difficult exam.              -Transvaginal ultrasound was essentially nondiagnostic secondary to limited visibility. There was concern for endometrial thickness up to 2.6 cm with moderate fluid free fluid. - MRI showed thickened endometrium with bilateral adnexal masses, omental caking, bilateral inguinal lymphadenopathy and moderate ascites. -Hemoglobin and hematocrit every 6 hours per primary team.   - CBC, BMP daily              -Gyn/Onc took over consult     - EMB attempted yesterday without success    - D&C, polypectomy, pap smear scheduled for 1300 in main OR today. Cardiac clearance obtained. ASA, and Brilinta held. NPO. Surgical consent obtained and in chart. Coags stable. Bilateral adnexal masses, omental caking, moderate ascites   -  elevated at 2780   - CEA elevated at 25.1   - CA 19-9 elevated at 2309   - CT Chest/Abd/Pelvis - Pulmonary emboli bilaterally, extensive adenopathy, ascites    - IR consulted - s/p biopsy of inguinal lymph node and IVC filter placement 9/10/19   - Oncology consulted    - Patient is not a surgical candidate.      Bilateral DVT, PE   - LE venous dopplers 9/10/19: Acute DVT of R femoral, superficial femoral, tibial and peroneal veins and acute DVT of left popliteal, tibial and peroneal veins              - S/p IVC filter placement 9/10/19              - Management per primary      Bacterial vaginosis              -Flagyl 500 mg twice daily x7 days     Acute on chronic diastolic CHF

## 2019-09-12 NOTE — PROGRESS NOTES
GYN/ONC Progress Note:    Patient may start Brilinta and ASA immediately. Patient may be discharged from a Gyn/Onc standpoint and will be seen outpatient. Will follow biopsy results. Gyn/Onc will sign off at this time. Please reconsult with any concerns. Appropriate bleeding is no more than soaking through one large juliana pad per hour for two consecutive hours. Monsels were used during surgery so brown or yellow discharge is to be expected.  Pain management with NSAIDs and tylenol/narcotics are ok if ok with primary team.     Electronically signed by Jeaneen Seip, DO on 9/12/2019 at 3:50 PM

## 2019-09-12 NOTE — PROGRESS NOTES
seen throughout the abdomen/pelvis and there is omental caking along with bilateral inguinal lymphadenopathy and moderate ascites. Evaluation is limited due to extensive artifact. Suggest correlation with contrast-enhanced CT of the abdomen/pelvis. Us Non Ob Transvaginal    Result Date: 2019  1. Essentially nondiagnostic study due to significantly limited visibility of the uterus and endometrial tissue. On transabdominal images, the endometrial tissue appears to measure up to 2.6 cm in thickness, which is abnormal in a patient of this age. Consider additional evaluation with MRI or short-term follow-up pelvic ultrasound. Ovaries were not seen. 2.  There is a moderate amount of free fluid seen in the pelvis, which is nonspecific. Ct Chest Abdomen Pelvis W Contrast    Result Date: 2019  Pulmonary emboli are noted bilaterally. There are no definitive findings that are diagnostic of right heart strain Bronchiectatic changes are noted. Punctate parenchymal nodular densities are noted, too small to characterize Abdomen and pelvis: Extensive adenopathy likely due to lymphoma or metastatic disease Ascites Low-density in the left lobe of the liver may represent artifact. However a developing low-density lesion would be difficult to exclude given the other findings in the abdomen and pelvis. Nonspecific heterogeneity of the uterus. Detail in this region is limited due to artifact. Right greater than left inguinal adenopathy. Bilateral renal calculi without hydronephrosis. Biopsy and/or PET scan recommended Critical results were called by Dr. Priscilla Izquierdo to Dr. Lane Paredes on 2019 at 22:38.        ASSESSMENT & PLAN     ASSESSMENT / PLAN:     Principal Problem:    Bilateral pulmonary embolism:  S/p IVC  Risk for bleeding given prior episode of vaginal bleeding, so anticoagulation not recommended  Lower extremity Doppler: proximal DVT  Bilaterally    Postmenopausal vaginal bleedin.6

## 2019-09-12 NOTE — CONSULTS
normal. No masses appreciated,  No organomegaly  Extremities:  no calf tenderness, +1 pitting edema bilaterally on LE        LAB RESULTS:  Recent Results (from the past 24 hour(s))   HEMOGLOBIN AND HEMATOCRIT, BLOOD     Collection Time: 09/08/19 12:35 PM   Result Value Ref Range     Hemoglobin 7.9 (L) 11.9 - 15.1 g/dL     Hematocrit 25.2 (L) 36.3 - 47.1 %   HEMOGLOBIN AND HEMATOCRIT, BLOOD     Collection Time: 09/08/19  6:45 PM   Result Value Ref Range     Hemoglobin 8.2 (L) 11.9 - 15.1 g/dL     Hematocrit 27.1 (L) 36.3 - 47.1 %        Collection Time: 09/08/19  6:45 PM   Result Value Ref Range      2780 (H) <38 U/mL   CEA     Collection Time: 09/08/19  6:45 PM   Result Value Ref Range     CEA 25.1 (H) <3.9 ng/mL   CANCER ANTIGEN 19-9     Collection Time: 09/08/19  6:45 PM   Result Value Ref Range     CA 19-9 2309 (H) 0 - 35 U/mL   HEMOGLOBIN AND HEMATOCRIT, BLOOD     Collection Time: 09/09/19 12:13 AM   Result Value Ref Range     Hemoglobin 7.1 (L) 11.9 - 15.1 g/dL     Hematocrit 23.0 (L) 36.3 - 47.1 %   Basic Metabolic Panel w/ Reflex to MG     Collection Time: 09/09/19  5:17 AM   Result Value Ref Range     Glucose 96 70 - 99 mg/dL     BUN 25 (H) 8 - 23 mg/dL     CREATININE 0.97 (H) 0.50 - 0.90 mg/dL     Bun/Cre Ratio NOT REPORTED 9 - 20     Calcium 7.7 (L) 8.6 - 10.4 mg/dL     Sodium 136 135 - 144 mmol/L     Potassium 3.8 3.7 - 5.3 mmol/L     Chloride 102 98 - 107 mmol/L     CO2 25 20 - 31 mmol/L     Anion Gap 9 9 - 17 mmol/L     GFR Non- 56 (L) >60 mL/min     GFR African American >60 >60 mL/min     GFR Comment            GFR Staging NOT REPORTED     CBC WITH AUTO DIFFERENTIAL     Collection Time: 09/09/19  5:17 AM   Result Value Ref Range     WBC 7.2 3.5 - 11.3 k/uL     RBC 2.28 (L) 3.95 - 5.11 m/uL     Hemoglobin 7.3 (L) 11.9 - 15.1 g/dL     Hematocrit 24.2 (L) 36.3 - 47.1 %     .1 (H) 82.6 - 102.9 fL     MCH 32.0 25.2 - 33.5 pg     MCHC 30.2 28.4 - 34.8 g/dL     RDW 16.1 (H) seen. 2.  There is a moderate amount of free fluid seen in the pelvis, which is nonspecific. ASSESSMENT & PLAN:    Jalen Rees y. o. female G0     Postmenopausal Vaginal Bleeding              - VSS, afebrile              - s/p Cardiac Catheterization requiring 2 stents on 9/5/2019 and was started on Brilinta due to a drug-eluting stent.              - Pelvic exam revealed no active bleeding but was a difficult exam.              -Transvaginal ultrasound was essentially nondiagnostic secondary to limited visibility.  There was concern for endometrial thickness up to 2.6 cm with moderate fluid free fluid.                - MRI showed thickened endometrium with bilateral adnexal masses, omental caking, bilateral inguinal lymphadenopathy and moderate ascites.               -Hemoglobin and hematocrit every 6 hours per primary team.  Most recent hemoglobin 7.2.              -Gyn/Onc took over consult      Thickened endometrial lining, bilateral adnexal masses, omental caking, moderate ascites              -  elevated at 2780              - CEA elevated at 25.1              - CA 19-9 elevated at 2309              - will need to discuss case with attending and patient to see wishes of patient and whether or not patient is a good surgical candidate      Bacterial vaginosis              -Flagyl 500 mg twice daily x7 days     Acute on chronic diastolic CHF              -Status post cardiac catheterization on 9/5/2019.  2 stents placed. Anne Bowles started on Brilinta due to drug-eluting stent.              -Management per cardiology and primary team.     Hypertension              - Management per primary     Hypothyroidism              - Currently on Synthroid 50 mcg daily     Obesity              -BMI 41.9  Patient Active Problem List    Diagnosis Date Noted    Pulmonary embolism ([facility] [address] 09/10/2019    Inguinal lymphadenopathy     Congestive heart failure (HCC)     Postmenopausal bleeding     Weakness of

## 2019-09-13 NOTE — PROGRESS NOTES
Assessment/Plan:  Enedelia Somers 75 y.o. female G0, POD # 2 s/p pelvic exam under anesthesia, endometrial biopsy, biopsy of cervical lesion, vaginal polypectomy, and uterine dilation and curettage due to postmenopausal vaginal bleeding              - Afebrile VSS              -Transvaginal ultrasound was essentially nondiagnostic secondary to limited visibility.  There was concern for endometrial thickness up to 2.6 cm with moderate fluid free fluid.                - MRI showed thickened endometrium with bilateral adnexal masses, omental caking, bilateral inguinal lymphadenopathy and moderate ascites.               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              - S/p 1 unit PRBCs on 9/10/19, additional 1U ordered overnight               - Hgb continues to decrease most likely from cancer and not from vaginal bleeding.   - Gyn onc will continue to chart review until patient is discharge. Patient will follow up out patient. Patient will also follow up with Oncology. If any questions arise or patient needs to be seen by Gyn/Onc please reconsult.  Thank you.      NSTEMI              - s/p Cardiac Catheterization requiring 2 stents on 9/5/2019 and was started on Brilinta due to a drug-eluting stent              - Management per primary      CHF               - Management per primary

## 2019-09-13 NOTE — OP NOTE
[address] [address]                                OPERATIVE REPORT    PATIENT NAME: Izaiah Hilario                  :        1945  MED REC NO:   [id number]                             ROOM:         ACCOUNT NO:   [de-identified]                           ADMIT DATE: 2019  PROVIDER:     Dat Cotto MD    DATE OF PROCEDURE:  2019    PREOPERATIVE DIAGNOSES:  Endometrial thickening, enlarged uterus,  vaginal bleeding. POSTOPERATIVE DIAGNOSES:  Endometrial thickening, enlarged uterus,  vaginal bleeding, large vaginal polyp, and cervical lesion. PROCEDURES:  Pelvic examination under anesthesia, Pap smear, endometrial  biopsy, biopsy of cervical lesion, vaginal polypectomy, uterine dilation  and curettage. ANESTHESIA:  General.    SURGEON:  Dat Cotto MD    ASSISTANT:  Rosemarie Rivera DO    ESTIMATED BLOOD LOSS:  5 mL. COMPLICATIONS:  None. SPECIAL MEDICATIONS:  None. PACKS:  None. DRAINS:  None. LINES:  The patient had a peripheral IV of lactated Ringer's. BRIEF HISTORY AND INDICATION FOR OPERATION:  The patient is a  42-year-old,  0, para 0 female, who had a significant past  medical history of congestive heart failure, hypertension,  hypothyroidism, and morbid obesity, who presented to the emergency room  at [place] on 2019 with shortness of breath. The  patient was admitted to the coronary unit and was felt to be in  congestive heart failure. A cardiac catheterization was performed, and  she was found to have severe coronary artery disease, and therefore 2  stents were placed. She was started on Brilinta as an anticoagulant on  2019. The patient then developed an episode of vaginal bleeding,  and the Gynecologic Service was consulted. A transvaginal ultrasound  was ordered, and the endometrial stripe could not be seen.   A pelvic MRI  was

## 2019-09-13 NOTE — PROGRESS NOTES
primary     Hypothyroidism              - Synthroid 50 mcg daily     Obesity              -BMI 41.9    Principal Problem:    CHF (congestive heart failure), NYHA class I, acute on chronic, combined ([address]  Active Problems:    Hypertension    Arthritis    Morbid obesity (HCC)    Weakness of both lower limbs    Congestive heart failure (HCC)    Postmenopausal bleeding    Inguinal lymphadenopathy    Pulmonary embolism (HCC)    Pelvic EUA, D&C, pap smear, EMB, biopsy of cervical lesion, vaginal polypectomy 9/12/19  Resolved Problems:    * No resolved hospital problems. *      Attending: Dr. Inez Sandhoff      Please page the resident named below with questions and concerns. Chris Lehman DO  OBGYN Resident  Pgr: [phone]  9/13/2019, 9:00 AM     Attending GYN oncology note:  Patient seen and examined today. Patient had surgery yesterday. Surgery was explained to the patient and all questions were answered to her satisfaction. She understands it would be several days before we obtain results. She understands the possibility of malignancy. She has had no significant vaginal bleeding. She is back on all of her regular medications including the Brilinta and aspirin as previously ordered. Medical oncology has been consulted for chemotherapy. Radiation therapy may eventually be required as well if bleeding develops. Plans are for her to be discharged to a rehabilitation facility when she is deemed capable of discharge by the medical and cardiology services. Follow-up in my office following the completion of oncologic therapy.     Ariel Gracia MD    [facility] gynecologic oncology

## 2019-09-13 NOTE — PROGRESS NOTES
APTT 23.7     CARDIAC ENZYMES: No results for input(s): CKMB, CKMBINDEX, TROPONINI in the last 72 hours. Invalid input(s): CKTOTAL;3  FASTING LIPID PANEL:  Lab Results   Component Value Date    CHOL 157 05/30/2019    HDL 37 (L) 05/30/2019    TRIG 130 05/30/2019     LIVER PROFILE: No results for input(s): AST, ALT, ALB, BILIDIR, BILITOT, ALKPHOS in the last 72 hours. MICROBIOLOGY:   Lab Results   Component Value Date/Time    CULTURE NO SIGNIFICANT GROWTH 09/03/2019 12:50 AM       Imaging:    Mri Pelvis W Wo Contrast    Result Date: 9/9/2019  Findings highly suspicious for metastatic gynecologic malignancy. The endometrium is abnormally thickened and there are bilateral adnexal masses, right greater than left. Large enhancing masses are seen throughout the abdomen/pelvis and there is omental caking along with bilateral inguinal lymphadenopathy and moderate ascites. Evaluation is limited due to extensive artifact. Suggest correlation with contrast-enhanced CT of the abdomen/pelvis. Us Non Ob Transvaginal    Result Date: 9/9/2019  1. Essentially nondiagnostic study due to significantly limited visibility of the uterus and endometrial tissue. On transabdominal images, the endometrial tissue appears to measure up to 2.6 cm in thickness, which is abnormal in a patient of this age. Consider additional evaluation with MRI or short-term follow-up pelvic ultrasound. Ovaries were not seen. 2.  There is a moderate amount of free fluid seen in the pelvis, which is nonspecific. Ir Guided Ivc Filter Placement    Result Date: 9/11/2019  No caval thrombus Successful placement of an infrarenal inferior vena cava filter     Ir Fluoro Guided Needle Placement    Result Date: 9/11/2019  Successful ultrasound-guided core biopsy and aspiration of right inguinal mass. Ct Chest Abdomen Pelvis W Contrast    Result Date: 9/9/2019  Pulmonary emboli are noted bilaterally.   There are no definitive findings that are diagnostic of right heart strain Bronchiectatic changes are noted. Punctate parenchymal nodular densities are noted, too small to characterize Abdomen and pelvis: Extensive adenopathy likely due to lymphoma or metastatic disease Ascites Low-density in the left lobe of the liver may represent artifact. However a developing low-density lesion would be difficult to exclude given the other findings in the abdomen and pelvis. Nonspecific heterogeneity of the uterus. Detail in this region is limited due to artifact. Right greater than left inguinal adenopathy. Bilateral renal calculi without hydronephrosis. Biopsy and/or PET scan recommended Critical results were called by Dr. Zully Barnard to Dr. Parmjit Tyler on 9/9/2019 at 22:38. ASSESSMENT & PLAN     ASSESSMENT / PLAN:     Principal Problem:    Bilateral pulmonary embolism:  S/p IVC filter placement  Lower extremity Doppler: proximal DVT  Bilaterally  Restarted Brilinta     Postmenopausal vaginal bleeding:  Hb 7.6  Started her on Venofer and multivitamins  Will hold Lovenox for now  TVUS: Endometrial tissue 2.6 cm in thickness which is abnormal in a patient of this age. MRI: Thickened endometrial lining, bilateral adnexal masses, omental caking, moderate ascites  Ca 125 elevated at 2780  CEA elevated at 25.1   elevated at 2/3/2019  Biopsy result positive for Adenocarcinoma with mullerian primary. OBG/GYN signed off and will follow her outpatient.        CHF (congestive heart failure), NYHA class I, acute on chronic, combined (HCC)  Plan: Asymptomatic.    Last echo in 04/2019 showed left ventricle ejection fraction of 55%. Bilateral pitting edema     NSTEMI:  Troponin 44, no chest pain, EKG: Normal sinus rhythm, anterior infarct.   Cardiac cath: RCA and proximal artery stenosis status post 2 stent placement  Resumed aspirin and Brilinta      Bilateral lower extremity weakness:  Resolved  Secondary to hypothyroidism. Levothyroxine 50 MCG  TSH in 4 to 6 weeks outpatient.        Hypertension: Controlled  Plan: On Lopressor 50 twice daily and Aldactone       Morbid obesity (HCC)  Plan: Advised diet control        Bacterial vaginosis  Flagyl 500 mg twice daily for 7 days  Last dose 9/14/2019.     LINDSEY: Secondary to contrast related injury  Resolved     DVT ppx : EPC     PT/OT: ongoing  Discharge Planning / Neisha Ket: Kasia Hancock MD  Internal Medicine Resident, PGY-1  [address], [place]  9/13/2019, 6:54 AM

## 2019-09-13 NOTE — PROGRESS NOTES
conservation strategies at Mod I to complete UB self-cares at Mod I   Short term goal 2: demonstrate implementation of energy conservation strategies at Mod I to complete LB self-cares at Mod I   Short term goal 3: demonstrate functional mobility/transfers at Mod I to engage in ADL tasks  Short term goal 4: verbalize 3 energy conservation strategies to implement into ADL routine  Short term goal 5: demonstrate ~25 min of functional activity tolerance to engage in ADL tasks      Therapy Time   Individual Concurrent Group Co-treatment   Time In  1330         Time Out  [address] [facility], ANDREA/L

## 2019-09-13 NOTE — PROGRESS NOTES
breakfast Tatianna Pacheco MD        therapeutic multivitamin-minerals 1 tablet  1 tablet Oral Daily Tatianna Pacheco MD        [facility]) tablet 90 mg  90 mg Oral BID Yosvany Jensen MD   90 mg at 09/13/19 1128    aspirin chewable tablet 81 mg  81 mg Oral Daily Bakari De La Fuente MD   81 mg at 09/13/19 1129    0.9 % sodium chloride bolus  250 mL Intravenous Once Petey Glad, DO        0.9 % sodium chloride infusion   Intravenous Continuous Petey Glad, DO 20 mL/hr at 09/12/19 1732      sodium chloride flush 0.9 % injection 30 mL  30 mL Intravenous PRN Petey Glad, DO        polyethylene glycol (GLYCOLAX) packet 17 g  17 g Oral Daily Petey Glad, DO   17 g at 09/13/19 1128    metroNIDAZOLE (FLAGYL) tablet 500 mg  500 mg Oral 2 times per day Petey Glad, DO   500 mg at 09/13/19 1128    bumetanide (BUMEX) tablet 1 mg  1 mg Oral Daily Petey Glad, DO   1 mg at 09/13/19 1128    sodium chloride flush 0.9 % injection 10 mL  10 mL Intravenous 2 times per day Petey Glad, DO   10 mL at 09/13/19 1129    sodium chloride flush 0.9 % injection 10 mL  10 mL Intravenous PRN Petey Glad, DO        acetaminophen (TYLENOL) tablet 650 mg  650 mg Oral Q4H PRN Petey Glad, DO        levothyroxine (SYNTHROID) tablet 50 mcg  50 mcg Oral Daily Petey Glad, DO   50 mcg at 09/13/19 1128    spironolactone (ALDACTONE) tablet 25 mg  25 mg Oral Daily Petey Glad, DO   25 mg at 09/13/19 1128    sodium chloride flush 0.9 % injection 10 mL  10 mL Intravenous 2 times per day Petey Glad, DO   10 mL at 09/11/19 2330    sodium chloride flush 0.9 % injection 10 mL  10 mL Intravenous PRN Petey Glad, DO        magnesium hydroxide (MILK OF MAGNESIA) 400 MG/5ML suspension 30 mL  30 mL Oral Daily PRN Petey Glad, DO        ondansetron [facility]) injection 4 mg  4 mg Intravenous Q6H PRN Petey Glad, DO   4 mg at 09/05/19 1810    atorvastatin (LIPITOR) tablet 40 mg  40 mg Oral Nightly Rajat Heft, DO   40 mg at 19 2116    metoprolol tartrate (LOPRESSOR) tablet 50 mg  50 mg Oral BID Rajat Walsh, DO   50 mg at 19 1128       Allergies: Other and Sulfa antibiotics    Social History:   reports that she has never smoked. She has never used smokeless tobacco. She reports that she does not drink alcohol or use drugs. Family History: family history includes Heart Attack in her father; Heart Disease in her father; Other in her father and mother; Stroke in her father. REVIEW OF SYSTEMS:      Constitutional: No fever or chills. No night sweats, positive weight loss   Eyes: No eye discharge, double vision, or eye pain   HEENT: negative for sore mouth, sore throat, hoarseness and voice change   Respiratory: negative for cough , sputum, dyspnea, wheezing, hemoptysis, chest pain   Cardiovascular: negative for chest pain, dyspnea, palpitations, orthopnea, PND   Gastrointestinal: negative for nausea, vomiting, diarrhea, constipation, abdominal pain, Dysphagia, hematemesis and hematochezia   Genitourinary: negative for frequency, dysuria, nocturia, urinary incontinence, and hematuria   Integument: negative for rash, skin lesions, bruises.    Hematologic/Lymphatic: Positive right groin mass  Endocrine: negative for heat or cold intolerance,weight changes, change in bowel habits and hair loss   Musculoskeletal: negative for myalgias, arthralgias, pain, joint swelling,and bone pain   Neurological: negative for headaches, dizziness, seizures, weakness, numbness    PHYSICAL EXAM:        /62   Pulse 73   Temp 98.2 °F (36.8 °C) (Oral)   Resp 20   Ht 5' 7\" (1.702 m) Comment: from floor  Wt 277 lb 12.5 oz (126 kg)   SpO2 99%   BMI 43.51 kg/m²    Temp (24hrs), Av.3 °F (36.8 °C), Min:98 °F (36.7 °C), Max:99 °F (37.2 °C)      General appearance -not in distress  Mental status - alert and cooperative   Eyes - pupils equal and reactive, extraocular eye movements intact   Ears - bilateral TM's and external ear canals normal   Mouth - mucous membranes moist, pharynx normal without lesions   Neck - supple, no significant adenopathy   Lymphatics -5-6 cm right groin mass. Chest - clear to auscultation, no wheezes, rales or rhonchi, symmetric air entry   Heart - normal rate, regular rhythm, normal S1, S2, no murmurs  Abdomen - soft, nontender, nondistended, no masses or organomegaly   Neurological - alert, oriented, normal speech, no focal findings or movement disorder noted   Musculoskeletal - no joint tenderness, deformity or swelling   Extremities - peripheral pulses normal, no pedal edema, no clubbing or cyanosis   Skin - normal coloration and turgor, no rashes, no suspicious skin lesions noted ,      DATA:      Labs:     CBC:   Recent Labs     09/12/19  0533  09/13/19  0508 09/13/19  0814 09/13/19  1148   WBC 7.6  --  8.5  --   --    HGB 7.7*   < > 6.9* 7.8* 7.6*   HCT 25.3*   < > 23.8* 24.4* 24.6*     --  282  --   --     < > = values in this interval not displayed. BMP:   Recent Labs     09/12/19  0533 09/13/19  0508    140   K 3.9 4.0   CO2 24 26   BUN 22 25*   CREATININE 0.72 0.75   LABGLOM >60 >60   GLUCOSE 113* 109*     PT/INR:   Recent Labs     09/12/19 0533   PROTIME 13.4*   INR 1.3     APTT:  Recent Labs     09/12/19 0533   APTT 23.7     LIVER PROFILE:No results for input(s): AST, ALT, LABALBU in the last 72 hours. Results for orders placed or performed during the hospital encounter of 09/02/19   Urine culture clean catch   Result Value Ref Range    Specimen Description . CLEAN CATCH URINE     Special Requests NOT REPORTED     Culture NO SIGNIFICANT GROWTH    C.trachomatis N.gonorrhoeae DNA   Result Value Ref Range    Specimen Description . CERVIX     C. trachomatis DNA NEGATIVE NEGATIVE    N. gonorrhoeae DNA NEGATIVE NEGATIVE   VAGINITIS DNA PROBE   Result Value Ref Range    Specimen Description . VAGINA     Special Requests NOT REPORTED REPORTED     RBC Morphology ANISOCYTOSIS PRESENT     Platelet Estimate NOT REPORTED    BASIC METABOLIC PANEL   Result Value Ref Range    Glucose 117 (H) 70 - 99 mg/dL    BUN 23 8 - 23 mg/dL    CREATININE 0.85 0.50 - 0.90 mg/dL    Bun/Cre Ratio NOT REPORTED 9 - 20    Calcium 8.5 (L) 8.6 - 10.4 mg/dL    Sodium 135 135 - 144 mmol/L    Potassium 4.4 3.7 - 5.3 mmol/L    Chloride 100 98 - 107 mmol/L    CO2 22 20 - 31 mmol/L    Anion Gap 13 9 - 17 mmol/L    GFR Non-African American >60 >60 mL/min    GFR African American >60 >60 mL/min    GFR Comment          GFR Staging NOT REPORTED    Troponin   Result Value Ref Range    Troponin, High Sensitivity 55 (HH) 0 - 14 ng/L    Troponin T NOT REPORTED <0.03 ng/mL    Troponin Interp NOT REPORTED    Troponin   Result Value Ref Range    Troponin, High Sensitivity 56 (HH) 0 - 14 ng/L    Troponin T NOT REPORTED <0.03 ng/mL    Troponin Interp NOT REPORTED    TSH with Reflex   Result Value Ref Range    TSH 6.76 (H) 0.30 - 5.00 mIU/L   Brain Natriuretic Peptide   Result Value Ref Range    Pro-BNP 9,240 (H) <300 pg/mL    BNP Interpretation Pro-BNP Reference Range:    Hemoglobin and hematocrit, blood   Result Value Ref Range    POC Hemoglobin 10.9 (L) 12.0 - 16.0 g/dL    POC Hematocrit 32 (L) 36 - 46 %   SODIUM (POC)   Result Value Ref Range    POC Sodium 136 (L) 138 - 146 mmol/L   POTASSIUM (POC)   Result Value Ref Range    POC Potassium 4.2 3.5 - 4.5 mmol/L   CHLORIDE (POC)   Result Value Ref Range    POC Chloride 102 98 - 107 mmol/L   CALCIUM, IONIC (POC)   Result Value Ref Range    POC Ionized Calcium 1.13 (L) 1.15 - 1.33 mmol/L   T4, Free   Result Value Ref Range    Thyroxine, Free 1.34 0.93 - 1.70 ng/dL   CK   Result Value Ref Range    Total CK 49 26 - 192 U/L   Iron and TIBC   Result Value Ref Range    Iron 29 (L) 37 - 145 ug/dL    TIBC 170 (L) 250 - 450 ug/dL    Iron Saturation 17 (L) 20 - 55 %    UIBC 141 112 - 347 ug/dL   Ferritin   Result Value Ref Range    Ferritin 106 13 - 150  >60 >60 mL/min    GFR Comment          GFR Staging NOT REPORTED    MAGNESIUM   Result Value Ref Range    Magnesium 2.1 1.6 - 2.6 mg/dL   Troponin   Result Value Ref Range    Troponin, High Sensitivity 41 (H) 0 - 14 ng/L    Troponin T NOT REPORTED <0.03 ng/mL    Troponin Interp NOT REPORTED    Troponin   Result Value Ref Range    Troponin, High Sensitivity 44 (H) 0 - 14 ng/L    Troponin T NOT REPORTED <0.03 ng/mL    Troponin Interp NOT REPORTED    APTT   Result Value Ref Range    PTT 33.9 (H) 20.5 - 30.5 sec   CBC WITH AUTO DIFFERENTIAL   Result Value Ref Range    WBC 13.6 (H) 3.5 - 11.3 k/uL    RBC 3.34 (L) 3.95 - 5.11 m/uL    Hemoglobin 10.3 (L) 11.9 - 15.1 g/dL    Hematocrit 33.5 (L) 36.3 - 47.1 %    .3 82.6 - 102.9 fL    MCH 30.8 25.2 - 33.5 pg    MCHC 30.7 28.4 - 34.8 g/dL    RDW 15.2 (H) 11.8 - 14.4 %    Platelets 563 162 - 551 k/uL    MPV 9.5 8.1 - 13.5 fL    NRBC Automated 0.0 0.0 per 100 WBC    Differential Type NOT REPORTED     WBC Morphology NOT REPORTED     RBC Morphology ANISOCYTOSIS PRESENT     Platelet Estimate NOT REPORTED     Seg Neutrophils 88 (H) 36 - 65 %    Lymphocytes 6 (L) 24 - 43 %    Monocytes 5 3 - 12 %    Eosinophils % 0 (L) 1 - 4 %    Basophils 0 0 - 2 %    Immature Granulocytes 0 0 %    Segs Absolute 11.95 (H) 1.50 - 8.10 k/uL    Absolute Lymph # 0.84 (L) 1.10 - 3.70 k/uL    Absolute Mono # 0.69 0.10 - 1.20 k/uL    Absolute Eos # <0.03 0.00 - 0.44 k/uL    Basophils Absolute 0.05 0.00 - 0.20 k/uL    Absolute Immature Granulocyte 0.05 0.00 - 0.30 k/uL   Basic Metabolic Panel w/ Reflex to MG   Result Value Ref Range    Glucose 130 (H) 70 - 99 mg/dL    BUN 20 8 - 23 mg/dL    CREATININE 1.03 (H) 0.50 - 0.90 mg/dL    Bun/Cre Ratio NOT REPORTED 9 - 20    Calcium 8.0 (L) 8.6 - 10.4 mg/dL    Sodium 137 135 - 144 mmol/L    Potassium 4.4 3.7 - 5.3 mmol/L    Chloride 101 98 - 107 mmol/L    CO2 24 20 - 31 mmol/L    Anion Gap 12 9 - 17 mmol/L    GFR Non-African American 52 (L) AND HEMATOCRIT, BLOOD   Result Value Ref Range    Hemoglobin 8.3 (L) 11.9 - 15.1 g/dL    Hematocrit 25.5 (L) 36.3 - 47.1 %   HEMOGLOBIN AND HEMATOCRIT, BLOOD   Result Value Ref Range    Hemoglobin 8.1 (L) 11.9 - 15.1 g/dL    Hematocrit 26.1 (L) 36.3 - 47.1 %   HEMOGLOBIN AND HEMATOCRIT, BLOOD   Result Value Ref Range    Hemoglobin 7.6 (L) 11.9 - 15.1 g/dL    Hematocrit 24.1 (L) 36.3 - 47.1 %   Basic Metabolic Panel w/ Reflex to MG   Result Value Ref Range    Glucose 98 70 - 99 mg/dL    BUN 21 8 - 23 mg/dL    CREATININE 0.90 0.50 - 0.90 mg/dL    Bun/Cre Ratio NOT REPORTED 9 - 20    Calcium 7.6 (L) 8.6 - 10.4 mg/dL    Sodium 135 135 - 144 mmol/L    Potassium 4.0 3.7 - 5.3 mmol/L    Chloride 100 98 - 107 mmol/L    CO2 26 20 - 31 mmol/L    Anion Gap 9 9 - 17 mmol/L    GFR Non-African American >60 >60 mL/min    GFR African American >60 >60 mL/min    GFR Comment          GFR Staging NOT REPORTED    CBC WITH AUTO DIFFERENTIAL   Result Value Ref Range    WBC 7.6 3.5 - 11.3 k/uL    RBC 2.34 (L) 3.95 - 5.11 m/uL    Hemoglobin 7.2 (L) 11.9 - 15.1 g/dL    Hematocrit 24.0 (L) 36.3 - 47.1 %    .6 82.6 - 102.9 fL    MCH 30.8 25.2 - 33.5 pg    MCHC 30.0 28.4 - 34.8 g/dL    RDW 16.4 (H) 11.8 - 14.4 %    Platelets 209 739 - 974 k/uL    MPV 9.2 8.1 - 13.5 fL    NRBC Automated 0.0 0.0 per 100 WBC    Differential Type NOT REPORTED     WBC Morphology NOT REPORTED     RBC Morphology ANISOCYTOSIS PRESENT     Platelet Estimate NOT REPORTED     Seg Neutrophils 78 (H) 36 - 65 %    Lymphocytes 12 (L) 24 - 43 %    Monocytes 7 3 - 12 %    Eosinophils % 2 1 - 4 %    Basophils 0 0 - 2 %    Immature Granulocytes 1 (H) 0 %    Segs Absolute 5.91 1.50 - 8.10 k/uL    Absolute Lymph # 0.88 (L) 1.10 - 3.70 k/uL    Absolute Mono # 0.52 0.10 - 1.20 k/uL    Absolute Eos # 0.18 0.00 - 0.44 k/uL    Basophils Absolute 0.03 0.00 - 0.20 k/uL    Absolute Immature Granulocyte 0.04 0.00 - 0.30 k/uL   HEMOGLOBIN AND HEMATOCRIT, BLOOD   Result Value Ref Range HEMOGLOBIN AND HEMATOCRIT, BLOOD   Result Value Ref Range    Hemoglobin 8.1 (L) 11.9 - 15.1 g/dL    Hematocrit 27.4 (L) 36.3 - 47.1 %   HEMOGLOBIN AND HEMATOCRIT, BLOOD   Result Value Ref Range    Hemoglobin 8.5 (L) 11.9 - 15.1 g/dL    Hematocrit 28.9 (L) 36.3 - 47.1 %   Flow cytometry leukemia/lymphoma nodes or fluids   Result Value Ref Range    Flow Cytometry Source RT [address] LN     Flow Cytometry, Node/Fluid VS19 [id number]    Surgical Pathology   Result Value Ref Range    Surgical Pathology Report       [id number]  [address] [place] [address],  [address] [place], [address]  [phone]  Fax: [phone]  6 [facility]    Patient Name: Nikunj Mott  MR#: [id number]  Specimen #[id number]    Procedures/Addenda  FLOW CYTOMETRY REPORT     Date Ordered:     9/12/2019     Status:  Signed Out       Date Complete:     9/12/2019     By: Janey Askew M.D. Date Reported:     9/13/2019       INTERPRETATION  FLOW CYTOMETRIC IMMUNOPHENOTYPING ANALYSIS OF RIGHT INGUINAL LYMPH  NODE LYMPHOCYTE POPULATION IS NEGATIVE FOR B-CELL MONOCLONALITY AND  T-CELL ABERRANCY (REACTIVE LYMPHOCYTES). THE LARGE MALIGNANT CELLS  ARE NEGATIVE FOR HEMATOLYMPHOID MARKERS. RESULTS-COMMENTS  SPECIMEN TYPE:               CYTOCENTRIFUGE DIFFERENTIAL CELL COUNT:                             Lymphocytes               4%                           Neoplastic Cells               70%  Lymph Node Tissue               Degener ative Cells          26%  Viability:  59%                        Flow cytometric immunophenotyping analysis is performed on right  inguinal lymph node following RBC lysis procedure.   These cells are  labeled by direct, five color immunostaining procedure, and analyzed  on a  flow cytometer.                        % POSITIVE                                    Target Cells                    RESULTS: Description  Core biopsies of lymph node show effacement by metastatic poorly  differentiated adenocarcinoma. Immunostains performed include ER, p53  and PAX8 (controls reactive). The tumor is positive for PAX8, focally  positive for ER and appears have weak wild type staining pattern on  p53. The morphologic and immunophenotypic features compatible with  metastatic poorly differentiated adenocarcinoma, compatible with  mullerian primary. The radiologic features are most suggestive of  endometrial primary. Surgical Pathology   Result Value Ref Range    Surgical Pathology Report       [id number]  [facility]  ANATOMIC PATHOLOGY  [address] [place] [address], [facility] [address] [place], [address]  [phone]  Fax: [phone]  SURGICAL PATHOLOGY CONSULTATION    Patient Name: Luis Alva  MR#: [id number]  Specimen #[id number]    Procedures/Addenda  FLOW CYTOMETRY REPORT     Date Ordered:     9/11/2019     Status:  Signed Out       Date Complete:     9/11/2019     By: Renetta Gonzales M.D. Date Reported:     9/11/2019       INTERPRETATION  PERIPHERAL BLOOD:  -SEVERE NORMOCYTIC ANEMIA. -LYMPHOPENIA (880/UL); FLOW CYTOMETRY NEGATIVE (REACTIVE LYMPHOCYTES). FLOW CYTOMETRIC IMMUNOPHENOTYPING ANALYSIS OF PERIPHERAL BLOOD  LYMPHOCYTE POPULATION IS NEGATIVE FOR B-CELL MONOCLONALITY AND T-CELL  ABERRANCY.      RESULTS-COMMENTS                           PERIPHERAL BLOOD STUDY    HEMOGRAM                              DIFFERENTIAL %     ABSOLUTE  (K/UL)    WBC (K/uL)     7.6               IMM GRANS          1          0.04        RBC (K/ uL)     2.34               SEGS               78          5.91  HGB (G/dL)     7.2               LYMPHS          12          0.88  HCT (%)     24.0                                     MCV (FL.)     102.6               MONOS          7          0.52  MCH (PG.)     30.8               EOS               2          0.18  MCHC (g/dL)     30.0 purposes. It should not be regarded  as investigational or for research. This laboratory is certified  under the [address] (CLIA) as  qualified to perform high complexity clinical laboratory testing. Moody Kohler M.D.                    Source:  1: PERIPHERAL BLOOD FOR FLOW CYTOMETRY     HEMOGLOBIN AND HEMATOCRIT, BLOOD   Result Value Ref Range    Hemoglobin 9.6 (L) 11.9 - 15.1 g/dL    Hematocrit 30.5 (L) 36.3 - 47.1 %   Basic Metabolic Panel w/ Reflex to MG   Result Value Ref Range    Glucose 113 (H) 70 - 99 mg/dL    BUN 22 8 - 23 mg/dL    CREATININE 0.72 0.50 - 0.90 mg/dL    Bun/Cre Ratio NOT REPORTED 9 - 20    Calcium 7.7 (L) 8.6 - 10.4 mg/dL    Sodium 138 135 - 144 mmol/L    Potassium 3.9 3.7 - 5.3 mmol/L    Chloride 103 98 - 107 mmol/L    CO2 24 20 - 31 mmol/L    Anion Gap 11 9 - 17 mmol/L    GFR Non-African American >60 >60 mL/min    GFR African American >60 >60 mL/min    GFR Comment          GFR Staging NOT REPORTED    CBC WITH AUTO DIFFERENTIAL   Result Value Ref Range    WBC 7.6 3.5 - 11.3 k/uL    RBC 2.43 (L) 3.95 - 5.11 m/uL    Hemoglobin 7.7 (L) 11.9 - 15.1 g/dL    Hematocrit 25.3 (L) 36.3 - 47.1 %    .1 (H) 82.6 - 102.9 fL    MCH 31.7 25.2 - 33.5 pg    MCHC 30.4 28.4 - 34.8 g/dL    RDW 17.2 (H) 11.8 - 14.4 %    Platelets 541 374 - 157 k/uL    MPV 9.4 8.1 - 13.5 fL    NRBC Automated 0.3 (H) 0.0 per 100 WBC    Differential Type NOT REPORTED     WBC Morphology NOT REPORTED     RBC Morphology ANISOCYTOSIS PRESENT     Platelet Estimate NOT REPORTED     Seg Neutrophils 75 (H) 36 - 65 %    Lymphocytes 15 (L) 24 - 43 %    Monocytes 7 3 - 12 %    Eosinophils % 2 1 - 4 %    Basophils 1 0 - 2 %    Immature Granulocytes 1 (H) 0 %    Segs Absolute 5.73 1.50 - 8.10 k/uL    Absolute Lymph # 1.15 1.10 - 3.70 k/uL    Absolute Mono # 0.52 0.10 - 1.20 k/uL    Absolute Eos # 0.13 0.00 - 0.44 k/uL    Basophils Absolute 0.04 0.00 - 0.20 k/uL    Absolute Immature

## 2019-09-14 NOTE — PROGRESS NOTES
spironolactone (ALDACTONE) tablet 25 mg  25 mg Oral Daily Maile Alex, DO   25 mg at 09/14/19 4960    sodium chloride flush 0.9 % injection 10 mL  10 mL Intravenous 2 times per day Maile Alex, DO   10 mL at 09/11/19 2330    sodium chloride flush 0.9 % injection 10 mL  10 mL Intravenous PRN Maile Alex, DO        magnesium hydroxide (MILK OF MAGNESIA) 400 MG/5ML suspension 30 mL  30 mL Oral Daily PRN Maile Alex, DO        ondansetron [facility]) injection 4 mg  4 mg Intravenous Q6H PRN Maile Alex, DO   4 mg at 09/13/19 1405    atorvastatin (LIPITOR) tablet 40 mg  40 mg Oral Nightly Maile Alex, DO   40 mg at 09/13/19 2203    metoprolol tartrate (LOPRESSOR) tablet 50 mg  50 mg Oral BID Maile Alex, DO   50 mg at 09/14/19 3553       Allergies: Other and Sulfa antibiotics    Social History:   reports that she has never smoked. She has never used smokeless tobacco. She reports that she does not drink alcohol or use drugs. Family History: family history includes Heart Attack in her father; Heart Disease in her father; Other in her father and mother; Stroke in her father. REVIEW OF SYSTEMS:      Constitutional: No fever or chills. No night sweats, positive weight loss   Eyes: No eye discharge, double vision, or eye pain   HEENT: negative for sore mouth, sore throat, hoarseness and voice change   Respiratory: negative for cough , sputum, dyspnea, wheezing, hemoptysis, chest pain   Cardiovascular: negative for chest pain, dyspnea, palpitations, orthopnea, PND   Gastrointestinal: negative for nausea, vomiting, diarrhea, constipation, abdominal pain, Dysphagia, hematemesis and hematochezia   Genitourinary: negative for frequency, dysuria, nocturia, urinary incontinence, and hematuria   Integument: negative for rash, skin lesions, bruises.    Hematologic/Lymphatic: Positive right groin mass  Endocrine: negative for heat or cold intolerance,weight changes, change in bowel habits and hair - 2 %    Immature Granulocytes 0 0 %    Segs Absolute 11.95 (H) 1.50 - 8.10 k/uL    Absolute Lymph # 0.84 (L) 1.10 - 3.70 k/uL    Absolute Mono # 0.69 0.10 - 1.20 k/uL    Absolute Eos # <0.03 0.00 - 0.44 k/uL    Basophils Absolute 0.05 0.00 - 0.20 k/uL    Absolute Immature Granulocyte 0.05 0.00 - 0.30 k/uL   Basic Metabolic Panel w/ Reflex to MG   Result Value Ref Range    Glucose 130 (H) 70 - 99 mg/dL    BUN 20 8 - 23 mg/dL    CREATININE 1.03 (H) 0.50 - 0.90 mg/dL    Bun/Cre Ratio NOT REPORTED 9 - 20    Calcium 8.0 (L) 8.6 - 10.4 mg/dL    Sodium 137 135 - 144 mmol/L    Potassium 4.4 3.7 - 5.3 mmol/L    Chloride 101 98 - 107 mmol/L    CO2 24 20 - 31 mmol/L    Anion Gap 12 9 - 17 mmol/L    GFR Non-African American 52 (L) >60 mL/min    GFR African American >60 >60 mL/min    GFR Comment          GFR Staging NOT REPORTED    CBC   Result Value Ref Range    WBC 10.2 3.5 - 11.3 k/uL    RBC 2.83 (L) 3.95 - 5.11 m/uL    Hemoglobin 8.7 (L) 11.9 - 15.1 g/dL    Hematocrit 28.2 (L) 36.3 - 47.1 %    MCV 99.6 82.6 - 102.9 fL    MCH 30.7 25.2 - 33.5 pg    MCHC 30.9 28.4 - 34.8 g/dL    RDW 15.3 (H) 11.8 - 14.4 %    Platelets 817 495 - 065 k/uL    MPV 9.3 8.1 - 13.5 fL    NRBC Automated 0.0 0.0 per 100 WBC   Protime-INR   Result Value Ref Range    Protime 13.3 (H) 9.0 - 12.0 sec    INR 1.3    EOSINOPHILS, URINE   Result Value Ref Range    Eosinophil, Ur NONE SEEN NONE SEEN   BASIC METABOLIC PANEL   Result Value Ref Range    Glucose 127 (H) 70 - 99 mg/dL    BUN 27 (H) 8 - 23 mg/dL    CREATININE 1.29 (H) 0.50 - 0.90 mg/dL    Bun/Cre Ratio NOT REPORTED 9 - 20    Calcium 8.3 (L) 8.6 - 10.4 mg/dL    Sodium 136 135 - 144 mmol/L    Potassium 4.2 3.7 - 5.3 mmol/L    Chloride 100 98 - 107 mmol/L    CO2 22 20 - 31 mmol/L    Anion Gap 14 9 - 17 mmol/L    GFR Non-African American 40 (L) >60 mL/min    GFR  49 (L) >60 mL/min    GFR Comment          GFR Staging NOT REPORTED    HEMOGLOBIN AND HEMATOCRIT, BLOOD   Result Value Ref Eosinophils % 2 1 - 4 %    Basophils 0 0 - 2 %    Immature Granulocytes 0 0 %    Segs Absolute 6.05 1.50 - 8.10 k/uL    Absolute Lymph # 1.07 (L) 1.10 - 3.70 k/uL    Absolute Mono # 0.54 0.10 - 1.20 k/uL    Absolute Eos # 0.12 0.00 - 0.44 k/uL    Basophils Absolute 0.03 0.00 - 0.20 k/uL    Absolute Immature Granulocyte 0.03 0.00 - 0.30 k/uL   Basic Metabolic Panel   Result Value Ref Range    Glucose 97 70 - 99 mg/dL    BUN 28 (H) 8 - 23 mg/dL    CREATININE 0.92 (H) 0.50 - 0.90 mg/dL    Bun/Cre Ratio  9 - 20    Calcium 7.8 (L) 8.6 - 10.4 mg/dL    Sodium 135 135 - 144 mmol/L    Potassium 4.0 3.7 - 5.3 mmol/L    Chloride 99 98 - 107 mmol/L    CO2 25 20 - 31 mmol/L    Anion Gap 11 9 - 17 mmol/L    GFR Non-African American 60 (L) >60 mL/min    GFR African American >60 >60 mL/min    GFR Comment          GFR Staging        Result Value Ref Range     2780 (H) <38 U/mL   CEA   Result Value Ref Range    CEA 25.1 (H) <3.9 ng/mL   CANCER ANTIGEN 19-9   Result Value Ref Range    CA 19-9 2309 (H) 0 - 35 U/mL   HEMOGLOBIN AND HEMATOCRIT, BLOOD   Result Value Ref Range    Hemoglobin 7.1 (L) 11.9 - 15.1 g/dL    Hematocrit 23.0 (L) 36.3 - 47.1 %   Basic Metabolic Panel w/ Reflex to MG   Result Value Ref Range    Glucose 96 70 - 99 mg/dL    BUN 25 (H) 8 - 23 mg/dL    CREATININE 0.97 (H) 0.50 - 0.90 mg/dL    Bun/Cre Ratio NOT REPORTED 9 - 20    Calcium 7.7 (L) 8.6 - 10.4 mg/dL    Sodium 136 135 - 144 mmol/L    Potassium 3.8 3.7 - 5.3 mmol/L    Chloride 102 98 - 107 mmol/L    CO2 25 20 - 31 mmol/L    Anion Gap 9 9 - 17 mmol/L    GFR Non-African American 56 (L) >60 mL/min    GFR African American >60 >60 mL/min    GFR Comment          GFR Staging NOT REPORTED    CBC WITH AUTO DIFFERENTIAL   Result Value Ref Range    WBC 7.2 3.5 - 11.3 k/uL    RBC 2.28 (L) 3.95 - 5.11 m/uL    Hemoglobin 7.3 (L) 11.9 - 15.1 g/dL    Hematocrit 24.2 (L) 36.3 - 47.1 %    .1 (H) 82.6 - 102.9 fL    MCH 32.0 25.2 - 33.5 pg    MCHC 30.2 28.4 - 30.8 28.4 - 34.8 g/dL    RDW 17.1 (H) 11.8 - 14.4 %    Platelets 517 193 - 002 k/uL    MPV 9.3 8.1 - 13.5 fL    NRBC Automated 0.0 0.0 per 100 WBC    Differential Type NOT REPORTED     Seg Neutrophils 74 (H) 36 - 65 %    Lymphocytes 15 (L) 24 - 43 %    Monocytes 7 3 - 12 %    Eosinophils % 2 1 - 4 %    Basophils 1 0 - 2 %    Immature Granulocytes 1 (H) 0 %    Segs Absolute 5.69 1.50 - 8.10 k/uL    Absolute Lymph # 1.18 1.10 - 3.70 k/uL    Absolute Mono # 0.55 0.10 - 1.20 k/uL    Absolute Eos # 0.14 0.00 - 0.44 k/uL    Basophils Absolute 0.04 0.00 - 0.20 k/uL    Absolute Immature Granulocyte 0.07 0.00 - 0.30 k/uL    WBC Morphology NOT REPORTED     RBC Morphology ANISOCYTOSIS PRESENT     Platelet Estimate NOT REPORTED    HEMOGLOBIN AND HEMATOCRIT, BLOOD   Result Value Ref Range    Hemoglobin 8.1 (L) 11.9 - 15.1 g/dL    Hematocrit 27.4 (L) 36.3 - 47.1 %   HEMOGLOBIN AND HEMATOCRIT, BLOOD   Result Value Ref Range    Hemoglobin 8.5 (L) 11.9 - 15.1 g/dL    Hematocrit 28.9 (L) 36.3 - 47.1 %   Flow cytometry leukemia/lymphoma nodes or fluids   Result Value Ref Range    Flow Cytometry Source RT [address] LN     Flow Cytometry, Node/Fluid VS19 [id number]    Surgical Pathology   Result Value Ref Range    Surgical Pathology Report       [id number]  [facility]  [place]  CONSULTING PATHOLOGISTS [facility]  ANATOMIC PATHOLOGY  [address] [place] [address]. [employer], [address]  [phone]  Fax: [phone] [address]    Patient Name: Krishna Cedillo  MR#: [id number]  Specimen #[id number]    Procedures/Addenda  FLOW CYTOMETRY REPORT     Date Ordered:     9/12/2019     Status:  Signed Out       Date Complete:     9/12/2019     By: Nicole Painting M.D. Date Reported:     9/13/2019       INTERPRETATION  FLOW CYTOMETRIC IMMUNOPHENOTYPING ANALYSIS OF RIGHT INGUINAL LYMPH  NODE LYMPHOCYTE POPULATION IS NEGATIVE FOR B-CELL MONOCLONALITY AND  T-CELL ABERRANCY (REACTIVE LYMPHOCYTES).   THE LARGE MALIGNANT 11.     CD19               9       12. CD20               8       13. CD22               8       14. CD23               6       15. CD25               1       16. CD45               100       17. CD57               15       18.                1       19. FMC7               6       20. Kappa               5       21. Lambda               4           This test was develo ped and its performance characteristics determined  by [facility]. It has not  been cleared or approved by the U.S. Food and Drug Administration. The FDA does not require this test to go through premarket FDA review. This test is used for clinical purposes. It should not be regarded  as investigational or for research. This laboratory is certified  under the [address] (CLIA) as  qualified to perform high complexity clinical laboratory testing. Magui Ballard M.D.                    Source:  1: PERIPHERAL BLOOD FOR FLOW CYTOMETRY     HEMOGLOBIN AND HEMATOCRIT, BLOOD   Result Value Ref Range    Hemoglobin 9.6 (L) 11.9 - 15.1 g/dL    Hematocrit 30.5 (L) 36.3 - 47.1 %   Basic Metabolic Panel w/ Reflex to MG   Result Value Ref Range    Glucose 113 (H) 70 - 99 mg/dL    BUN 22 8 - 23 mg/dL    CREATININE 0.72 0.50 - 0.90 mg/dL    Bun/Cre Ratio NOT REPORTED 9 - 20    Calcium 7.7 (L) 8.6 - 10.4 mg/dL    Sodium 138 135 - 144 mmol/L    Potassium 3.9 3.7 - 5.3 mmol/L    Chloride 103 98 - 107 mmol/L    CO2 24 20 - 31 mmol/L    Anion Gap 11 9 - 17 mmol/L    GFR Non-African American >60 >60 mL/min    GFR African American >60 >60 mL/min    GFR Comment          GFR Staging NOT REPORTED    CBC WITH AUTO DIFFERENTIAL   Result Value Ref Range    WBC 7.6 3.5 - 11.3 k/uL    RBC 2.43 (L) 3.95 - 5.11 m/uL    Hemoglobin 7.7 (L) 11.9 - 15.1 g/dL    Hematocrit 25.3 (L) 36.3 - 47.1 %    .1 (H) 82.6 - 102.9 fL    MCH 31.7 25.2 - 33.5 cellularity; predominantly blood. Descriptive Diagnosis:       Adenocarcinoma.         Cytotechnologist:   Chad Infante M.D.  **Electronically Signed Out**         ajb/9/13/2019     Basic Metabolic Panel w/ Reflex to MG   Result Value Ref Range    Glucose 140 (H) 70 - 99 mg/dL    BUN 40 (H) 8 - 23 mg/dL    CREATININE 1.14 (H) 0.50 - 0.90 mg/dL    Bun/Cre Ratio NOT REPORTED 9 - 20    Calcium 7.7 (L) 8.6 - 10.4 mg/dL    Sodium 139 135 - 144 mmol/L    Potassium 4.4 3.7 - 5.3 mmol/L    Chloride 104 98 - 107 mmol/L    CO2 24 20 - 31 mmol/L    Anion Gap 11 9 - 17 mmol/L    GFR Non-African American 46 (L) >60 mL/min    GFR  56 (L) >60 mL/min    GFR Comment          GFR Staging NOT REPORTED    CBC WITH AUTO DIFFERENTIAL   Result Value Ref Range    WBC 9.2 3.5 - 11.3 k/uL    RBC 2.28 (L) 3.95 - 5.11 m/uL    Hemoglobin 7.0 (LL) 11.9 - 15.1 g/dL    Hematocrit 22.7 (L) 36.3 - 47.1 %    MCV 99.6 82.6 - 102.9 fL    MCH 30.7 25.2 - 33.5 pg    MCHC 30.8 28.4 - 34.8 g/dL    RDW 19.6 (H) 11.8 - 14.4 %    Platelets 760 448 - 044 k/uL    MPV 9.7 8.1 - 13.5 fL    NRBC Automated 0.3 (H) 0.0 per 100 WBC    Differential Type NOT REPORTED     WBC Morphology NOT REPORTED     RBC Morphology ANISOCYTOSIS PRESENT     Platelet Estimate NOT REPORTED     Seg Neutrophils 81 (H) 36 - 65 %    Lymphocytes 11 (L) 24 - 43 %    Monocytes 6 3 - 12 %    Eosinophils % 0 (L) 1 - 4 %    Basophils 0 0 - 2 %    Immature Granulocytes 1 (H) 0 %    Segs Absolute 7.44 1.50 - 8.10 k/uL    Absolute Lymph # 1.04 (L) 1.10 - 3.70 k/uL    Absolute Mono # 0.55 0.10 - 1.20 k/uL    Absolute Eos # <0.03 0.00 - 0.44 k/uL    Basophils Absolute <0.03 0.00 - 0.20 k/uL    Absolute Immature Granulocyte 0.11 0.00 - 0.30 k/uL   Venous Blood Gas, POC   Result Value Ref Range    pH, Harry 7.460 (H) 7.320 - 7.430    pCO2, Harry 31.5 (L) 41.0 - 51.0 mm Hg    pO2, Harry 36.9 30.0 - 50.0 mm Hg    HCO3, Venous 22.4 22.0 - 29.0 mmol/L    Total CO2, Venous 23 23.0 - 30.0 COMPATIBLE    BLOOD BANK SPECIMEN   Result Value Ref Range    Blood Bank Specimen NOT REPORTED    BLOOD BANK SPECIMEN   Result Value Ref Range    Blood Bank Specimen NOT REPORTED    TYPE AND SCREEN   Result Value Ref Range    Expiration Date 09/16/2019     Arm Band Number [id number]     ABO/Rh A POSITIVE     Antibody Screen NEGATIVE     Unit Number [id number]     Product Code Leukocyte Reduced Red Cell     Unit Divison 00     Dispense Status ALLOCATED     Transfusion Status OK TO TRANSFUSE     Crossmatch Result COMPATIBLE          IMAGING DATA:    Xr Chest Standard (2 Vw)    Result Date: 9/2/2019  EXAMINATION: TWO XRAY VIEWS OF THE CHEST 9/2/2019 9:04 am COMPARISON: Chest radiograph dated 08/27/2019 and 04/13/2019 HISTORY: ORDERING SYSTEM PROVIDED HISTORY: SOB with exertion, low saturation TECHNOLOGIST PROVIDED HISTORY: SOB with exertion, low saturation Reason for Exam: shortness of breath with exertion FINDINGS: Mild cardiomegaly. No infiltrates. No effusions. No pneumothorax. Mild enlargement of the right lung hilum is similar to chest radiograph of 04/13/2019. Mild cardiomegaly. No acute findings in the chest.     Xr Chest Standard (2 Vw)    Result Date: 8/27/2019  EXAMINATION: TWO XRAY VIEWS OF THE CHEST 8/27/2019 4:08 pm COMPARISON: Chest radiograph performed 04/14/2019. HISTORY: ORDERING SYSTEM PROVIDED HISTORY: Shortness of breath TECHNOLOGIST PROVIDED HISTORY: shortness of breath Reason for Exam: Pt states SOB recent cataract surgery hx of pneumonia x 2 mths ago pt struggles to walk with out SOB Acuity: Chronic Type of Exam: Initial Additional signs and symptoms: Pt states SOB recent cataract surgery hx of pneumonia x 2 mths ago pt struggles to walk with out SOB FINDINGS: There is no acute consolidation or effusion. There is no pneumothorax. The mediastinal structures are unremarkable. The upper abdomen is unremarkable. The extrathoracic soft tissues are unremarkable.   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 NSTEMI status post PCI with 2 stents, continue to be on aspirin and Brilinta  Acute blood loss anemia  Bilateral PE,DVT S/P IVC filter  hypothyroidism  Obesity  Bacterial vaginosis  LINDSEY    RECOMMENDATIONS:  Personally reviewed results of lab work-up and other relevant clinical data. Pathology came back as mullerian tumor. Imaging and histology point toward endometrial primary    Case discussed over phone with Dr. Jon Valle from gynecological oncology. Patient is not a candidate for debulking surgery    Discussed treatment options which will include systemic cytotoxic therapy. Intention of treatment will be to control her disease and not cure the patient. Patient expressed understanding and she is interested in treatment. However her performance status is borderline. At time of discharge we will set up outpatient follow-up appointment at [facility] office. Encouraged the patient to bring her family members to the appointment as well    Patient status post IVC filter placement due to acute DVT and ongoing vaginal bleeding    Status post stent placement and on dual antiplatelet . Patient on aspirin and Brilinta. Does not seem to be tolerating well as suggested by drop in hemoglobin. cardiology wants to continue aspirin and brilinta. Discussed with patient and Nurse. Thank you for asking us to see this patient. Tashia Jose MD  INTERNAL MEDICINE RESIDENT, PGY-2  [id number] [facility]  9/14/2019,10:55 AM    Attending Physician Statement  The patient was seen and examined during rounds, I have discussed the care of Kaye Mcknight, including pertinent history and exam findings with the resident.  I have reviewed the key elements of all parts of the encounter with the

## 2019-09-14 NOTE — PROGRESS NOTES
and dry. Good color, turgor and pigmentation. No lesions or scars. No cyanosis or clubbing  Neurological/Psychiatric: The patient's general behavior, level of consciousness, thought content and emotional status is normal.        Medications:  Scheduled Medications:    ferrous sulfate  325 mg Oral Daily with breakfast    therapeutic multivitamin-minerals  1 tablet Oral Daily    sodium chloride  250 mL Intravenous Once    ticagrelor  90 mg Oral BID    aspirin  81 mg Oral Daily    sodium chloride  250 mL Intravenous Once    polyethylene glycol  17 g Oral Daily    metroNIDAZOLE  500 mg Oral 2 times per day    bumetanide  1 mg Oral Daily    sodium chloride flush  10 mL Intravenous 2 times per day    levothyroxine  50 mcg Oral Daily    spironolactone  25 mg Oral Daily    sodium chloride flush  10 mL Intravenous 2 times per day    atorvastatin  40 mg Oral Nightly    metoprolol tartrate  50 mg Oral BID     Continuous Infusions:    sodium chloride 20 mL/hr at 09/12/19 1732     PRN Medications  sodium chloride flush 30 mL PRN   sodium chloride flush 10 mL PRN   acetaminophen 650 mg Q4H PRN   sodium chloride flush 10 mL PRN   magnesium hydroxide 30 mL Daily PRN   ondansetron 4 mg Q6H PRN       Diagnostic Labs:  CBC: Recent Labs     09/12/19 0533 09/13/19  0508  09/13/19  1148 09/13/19  1810 09/14/19  0435   WBC 7.6  --  8.5  --   --   --  9.2   RBC 2.43*  --  2.28*  --   --   --  2.28*   HGB 7.7*   < > 6.9*   < > 7.6* 6.6* 7.0*   HCT 25.3*   < > 23.8*   < > 24.6* 22.7* 22.7*   .1*  --  104.4*  --   --   --  99.6   RDW 17.2*  --  17.6*  --   --   --  19.6*     --  282  --   --   --  270    < > = values in this interval not displayed. BMP: Recent Labs     09/12/19 0533 09/13/19  0508 09/14/19  0435    140 139   K 3.9 4.0 4.4    104 104   CO2 24 26 24   BUN 22 25* 40*   CREATININE 0.72 0.75 1.14*     BNP: No results for input(s): BNP in the last 72 hours.   PT/INR:   Recent Labs

## 2019-09-14 NOTE — PROGRESS NOTES
Notified on call medicine resident of critical hemoglobin of 7. Waiting for orders. 1 - On call medicine resident advised writer to continue to monitor. 1045 - Paged on call medicine resident with a calcium level of 7.7. Will continue to monitor.

## 2019-09-15 NOTE — PROGRESS NOTES
98%   BMI 43.26 kg/m²     Sodium 139, potassium 4.3, BUN 49, creatinine 1.35, hemoglobin 6.9, WBC 12.9, platelets 829    Patient seen and examined  Patient denied any chest pain or palpitation  No shortness of breath at rest    Medications:   Scheduled Meds:   cefTRIAXone (ROCEPHIN) IV  1 g Intravenous Q24H    ferrous sulfate  325 mg Oral Daily with breakfast    therapeutic multivitamin-minerals  1 tablet Oral Daily    sodium chloride  250 mL Intravenous Once    ticagrelor  90 mg Oral BID    aspirin  81 mg Oral Daily    polyethylene glycol  17 g Oral Daily    bumetanide  1 mg Oral Daily    sodium chloride flush  10 mL Intravenous 2 times per day    levothyroxine  50 mcg Oral Daily    spironolactone  25 mg Oral Daily    sodium chloride flush  10 mL Intravenous 2 times per day    atorvastatin  40 mg Oral Nightly    metoprolol tartrate  50 mg Oral BID     Continuous Infusions:   sodium chloride 20 mL/hr at 09/12/19 1732     CBC:   Recent Labs     09/13/19  0508  09/14/19  0435  09/15/19  0045 09/15/19  0446 09/15/19  1159   WBC 8.5  --  9.2  --   --  12.9*  --    HGB 6.9*   < > 7.0*   < > 7.5* 7.2* 6.9*     --  270  --   --  253  --     < > = values in this interval not displayed. BMP:    Recent Labs     09/13/19  0508 09/14/19  0435 09/15/19  0446    139 139   K 4.0 4.4 4.3    104 103   CO2 26 24 23   BUN 25* 40* 49*   CREATININE 0.75 1.14* 1.35*   GLUCOSE 109* 140* 155*     Hepatic: No results for input(s): AST, ALT, ALB, BILITOT, ALKPHOS in the last 72 hours. Troponin: No results for input(s): TROPONINI in the last 72 hours. BNP: No results for input(s): BNP in the last 72 hours. Lipids: No results for input(s): CHOL, HDL in the last 72 hours. Invalid input(s): LDLCALCU  INR: No results for input(s): INR in the last 72 hours.     Objective:   Vitals: BP (!) 151/74   Pulse 77   Temp 98.2 °F (36.8 °C) (Oral)   Resp 21   Ht 5' 7\" (1.702 m) Comment: from floor  Wt 276 lb (congestive heart failure), NYHA class I, acute on chronic, combined (HCC)     Weakness of both lower limbs     Congestive heart failure (HCC)     Postmenopausal bleeding     Inguinal lymphadenopathy     Pulmonary embolism (HCC)     Pelvic EUA, D&C, pap smear, EMB, biopsy of cervical lesion, vaginal polypectomy 9/12/19      Plan of Treatment:   Medication checked  Agree with current management and medication  Internal medicine and oncology notes reviewed    Electronically signed by Alcides Parisi MD on 9/15/2019 at 12:59 PM

## 2019-09-15 NOTE — PROGRESS NOTES
MD Luis Enrique   losartan (COZAAR) 50 MG tablet Take 1 tablet by mouth daily 6/12/19   Medina Walsh MD     Current Facility-Administered Medications   Medication Dose Route Frequency Provider Last Rate Last Dose    cefTRIAXone (ROCEPHIN) 1 g IVPB in 50 mL D5W minibag  1 g Intravenous Q24H Bryan Sierra MD   Stopped at 09/15/19 1059    ferrous sulfate EC tablet 325 mg  325 mg Oral Daily with breakfast Lizzy Pacheco MD   325 mg at 09/15/19 6988    therapeutic multivitamin-minerals 1 tablet  1 tablet Oral Daily Lizzy Pacheco MD   1 tablet at 09/15/19 0838    0.9 % sodium chloride bolus  250 mL Intravenous Once Ryan Deep, DO        ticagrelor (BRILINTA) tablet 90 mg  90 mg Oral BID Gilberto Nielson MD   90 mg at 09/15/19 5176    aspirin chewable tablet 81 mg  81 mg Oral Daily Bakari Montez MD   81 mg at 09/15/19 0838    0.9 % sodium chloride infusion   Intravenous Continuous Brendalyn Sill, DO 20 mL/hr at 09/12/19 1732      sodium chloride flush 0.9 % injection 30 mL  30 mL Intravenous PRN Brendalyn Sill, DO        polyethylene glycol [facility]) packet 17 g  17 g Oral Daily Brendalyn Sill, DO   17 g at 09/15/19 4521    bumetanide (BUMEX) tablet 1 mg  1 mg Oral Daily Brendalyn Sill, DO   1 mg at 09/15/19 5208    sodium chloride flush 0.9 % injection 10 mL  10 mL Intravenous 2 times per day Brendalyn Sill, DO   10 mL at 09/15/19 7830    sodium chloride flush 0.9 % injection 10 mL  10 mL Intravenous PRN Brendalyn Sill, DO        acetaminophen (TYLENOL) tablet 650 mg  650 mg Oral Q4H PRN Brendalyn Sill, DO        levothyroxine (SYNTHROID) tablet 50 mcg  50 mcg Oral Daily Brendalyn Sill, DO   50 mcg at 09/15/19 7306    spironolactone (ALDACTONE) tablet 25 mg  25 mg Oral Daily Brendalyn Sill, DO   25 mg at 09/15/19 1666    sodium chloride flush 0.9 % injection 10 mL  10 mL Intravenous 2 times per day Damian Cross DO   10 mL at 09/15/19 0833    sodium chloride flush 0.9 % injection 10 mL  10 mL Intravenous PRN Brendalyn Sill, DO        magnesium hydroxide (MILK OF MAGNESIA) 400 MG/5ML suspension 30 mL  30 mL Oral Daily PRN Brendalyn Sill, DO        ondansetron [facility] injection 4 mg  4 mg Intravenous Q6H PRN Brendalyn Sill, DO   4 mg at 09/13/19 1405    atorvastatin (LIPITOR) tablet 40 mg  40 mg Oral Nightly Brendalyn Sill, DO   40 mg at 09/14/19 2028    metoprolol tartrate (LOPRESSOR) tablet 50 mg  50 mg Oral BID Brendalyn Sill, DO   50 mg at 09/15/19 7256       Allergies: Other and Sulfa antibiotics    Social History:   reports that she has never smoked. She has never used smokeless tobacco. She reports that she does not drink alcohol or use drugs. Family History: family history includes Heart Attack in her father; Heart Disease in her father; Other in her father and mother; Stroke in her father. REVIEW OF SYSTEMS:      Constitutional: No fever or chills. No night sweats, positive weight loss   Eyes: No eye discharge, double vision, or eye pain   HEENT: negative for sore mouth, sore throat, hoarseness and voice change   Respiratory: negative for cough , sputum, dyspnea, wheezing, hemoptysis, chest pain   Cardiovascular: negative for chest pain, dyspnea, palpitations, orthopnea, PND   Gastrointestinal: negative for nausea, vomiting, diarrhea, constipation, abdominal pain, Dysphagia, hematemesis and hematochezia   Genitourinary: negative for frequency, dysuria, nocturia, urinary incontinence, and hematuria   Integument: negative for rash, skin lesions, bruises.    Hematologic/Lymphatic: Positive right groin mass  Endocrine: negative for heat or cold intolerance,weight changes, change in bowel habits and hair loss   Musculoskeletal: negative for myalgias, arthralgias, pain, joint swelling,and bone pain   Neurological: negative for headaches, dizziness, seizures, weakness, numbness    PHYSICAL EXAM:        BP (!) 151/74   Pulse 77   Temp 98.2 °F (36.8 °C) (Oral)   Resp 21  5' 7\" (1.702 m) Comment: from floor  Wt 276 lb 3.8 oz (125.3 kg)   SpO2 98%   BMI 43.26 kg/m²    Temp (24hrs), Av.2 °F (36.8 °C), Min:98 °F (36.7 °C), Max:98.5 °F (36.9 °C)      General appearance -not in distress  Mental status - alert and cooperative   Eyes - pupils equal and reactive, extraocular eye movements intact   Ears - bilateral TM's and external ear canals normal   Mouth - mucous membranes moist, pharynx normal without lesions   Neck - supple, no significant adenopathy   Lymphatics -5-6 cm right groin mass. Chest - clear to auscultation, no wheezes, rales or rhonchi, symmetric air entry   Heart - normal rate, regular rhythm, normal S1, S2, no murmurs  Abdomen - soft, nontender, nondistended, no masses or organomegaly   Neurological - alert, oriented, normal speech, no focal findings or movement disorder noted   Musculoskeletal - no joint tenderness, deformity or swelling   Extremities - peripheral pulses normal, no pedal edema, no clubbing or cyanosis   Skin - normal coloration and turgor, no rashes, no suspicious skin lesions noted ,      DATA:      Labs:     CBC:   Recent Labs     195  09/15/19  0045 09/15/19  0446   WBC 9.2  --   --  12.9*   HGB 7.0*   < > 7.5* 7.2*   HCT 22.7*   < > 23.5* 23.5*     --   --  253    < > = values in this interval not displayed. BMP:   Recent Labs     19  0435 09/15/19  0446    139   K 4.4 4.3   CO2 24 23   BUN 40* 49*   CREATININE 1.14* 1.35*   LABGLOM 46* 38*   GLUCOSE 140* 155*     PT/INR:   No results for input(s): PROTIME, INR in the last 72 hours. APTT:  No results for input(s): APTT in the last 72 hours. LIVER PROFILE:No results for input(s): AST, ALT, LABALBU in the last 72 hours. Results for orders placed or performed during the hospital encounter of 09/02/19   Urine culture clean catch   Result Value Ref Range    Specimen Description . CLEAN CATCH URINE     Special Requests NOT REPORTED     Culture NO SIGNIFICANT REPORTED <0.03 ng/mL    Troponin Interp NOT REPORTED    Troponin   Result Value Ref Range    Troponin, High Sensitivity 44 (H) 0 - 14 ng/L    Troponin T NOT REPORTED <0.03 ng/mL    Troponin Interp NOT REPORTED    APTT   Result Value Ref Range    PTT 33.9 (H) 20.5 - 30.5 sec   CBC WITH AUTO DIFFERENTIAL   Result Value Ref Range    WBC 13.6 (H) 3.5 - 11.3 k/uL    RBC 3.34 (L) 3.95 - 5.11 m/uL    Hemoglobin 10.3 (L) 11.9 - 15.1 g/dL    Hematocrit 33.5 (L) 36.3 - 47.1 %    .3 82.6 - 102.9 fL    MCH 30.8 25.2 - 33.5 pg    MCHC 30.7 28.4 - 34.8 g/dL    RDW 15.2 (H) 11.8 - 14.4 %    Platelets 647 195 - 728 k/uL    MPV 9.5 8.1 - 13.5 fL    NRBC Automated 0.0 0.0 per 100 WBC    Differential Type NOT REPORTED     WBC Morphology NOT REPORTED     RBC Morphology ANISOCYTOSIS PRESENT     Platelet Estimate NOT REPORTED     Seg Neutrophils 88 (H) 36 - 65 %    Lymphocytes 6 (L) 24 - 43 %    Monocytes 5 3 - 12 %    Eosinophils % 0 (L) 1 - 4 %    Basophils 0 0 - 2 %    Immature Granulocytes 0 0 %    Segs Absolute 11.95 (H) 1.50 - 8.10 k/uL    Absolute Lymph # 0.84 (L) 1.10 - 3.70 k/uL    Absolute Mono # 0.69 0.10 - 1.20 k/uL    Absolute Eos # <0.03 0.00 - 0.44 k/uL    Basophils Absolute 0.05 0.00 - 0.20 k/uL    Absolute Immature Granulocyte 0.05 0.00 - 0.30 k/uL   Basic Metabolic Panel w/ Reflex to MG   Result Value Ref Range    Glucose 130 (H) 70 - 99 mg/dL    BUN 20 8 - 23 mg/dL    CREATININE 1.03 (H) 0.50 - 0.90 mg/dL    Bun/Cre Ratio NOT REPORTED 9 - 20    Calcium 8.0 (L) 8.6 - 10.4 mg/dL    Sodium 137 135 - 144 mmol/L    Potassium 4.4 3.7 - 5.3 mmol/L    Chloride 101 98 - 107 mmol/L    CO2 24 20 - 31 mmol/L    Anion Gap 12 9 - 17 mmol/L    GFR Non-African American 52 (L) >60 mL/min    GFR African American >60 >60 mL/min    GFR Comment          GFR Staging NOT REPORTED    CBC   Result Value Ref Range    WBC 10.2 3.5 - 11.3 k/uL    RBC 2.83 (L) 3.95 - 5.11 m/uL    Hemoglobin 8.7 (L) 11.9 - 15.1 g/dL    Hematocrit 28.2 (L) 15.1 g/dL    Hematocrit 25.3 (L) 36.3 - 47.1 %   HEMOGLOBIN AND HEMATOCRIT, BLOOD   Result Value Ref Range    Hemoglobin 7.9 (L) 11.9 - 15.1 g/dL    Hematocrit 25.2 (L) 36.3 - 47.1 %   HEMOGLOBIN AND HEMATOCRIT, BLOOD   Result Value Ref Range    Hemoglobin 8.2 (L) 11.9 - 15.1 g/dL    Hematocrit 27.1 (L) 36.3 - 47.1 %   CBC WITH AUTO DIFFERENTIAL   Result Value Ref Range    WBC 7.8 3.5 - 11.3 k/uL    RBC 2.49 (L) 3.95 - 5.11 m/uL    Hemoglobin 7.9 (L) 11.9 - 15.1 g/dL    Hematocrit 25.5 (L) 36.3 - 47.1 %    .4 82.6 - 102.9 fL    MCH 31.7 25.2 - 33.5 pg    MCHC 31.0 28.4 - 34.8 g/dL    RDW 15.9 (H) 11.8 - 14.4 %    Platelets 240 218 - 626 k/uL    MPV 9.6 8.1 - 13.5 fL    NRBC Automated 0.0 0.0 per 100 WBC    Differential Type NOT REPORTED     WBC Morphology NOT REPORTED     RBC Morphology ANISOCYTOSIS PRESENT     Platelet Estimate NOT REPORTED     Seg Neutrophils 77 (H) 36 - 65 %    Lymphocytes 14 (L) 24 - 43 %    Monocytes 7 3 - 12 %    Eosinophils % 2 1 - 4 %    Basophils 0 0 - 2 %    Immature Granulocytes 0 0 %    Segs Absolute 6.05 1.50 - 8.10 k/uL    Absolute Lymph # 1.07 (L) 1.10 - 3.70 k/uL    Absolute Mono # 0.54 0.10 - 1.20 k/uL    Absolute Eos # 0.12 0.00 - 0.44 k/uL    Basophils Absolute 0.03 0.00 - 0.20 k/uL    Absolute Immature Granulocyte 0.03 0.00 - 0.30 k/uL   Basic Metabolic Panel   Result Value Ref Range    Glucose 97 70 - 99 mg/dL    BUN 28 (H) 8 - 23 mg/dL    CREATININE 0.92 (H) 0.50 - 0.90 mg/dL    Bun/Cre Ratio  9 - 20    Calcium 7.8 (L) 8.6 - 10.4 mg/dL    Sodium 135 135 - 144 mmol/L    Potassium 4.0 3.7 - 5.3 mmol/L    Chloride 99 98 - 107 mmol/L    CO2 25 20 - 31 mmol/L    Anion Gap 11 9 - 17 mmol/L    GFR Non-African American 60 (L) >60 mL/min    GFR African American >60 >60 mL/min    GFR Comment          GFR Staging        Result Value Ref Range     2780 (H) <38 U/mL   CEA   Result Value Ref Range    CEA 25.1 (H) <3.9 ng/mL   CANCER ANTIGEN 19-9   Result Value Ref Range Result Value Ref Range    Flow Cytometry Bl [id number]    HEMOGLOBIN AND HEMATOCRIT, BLOOD   Result Value Ref Range    Hemoglobin 8.3 (L) 11.9 - 15.1 g/dL    Hematocrit 26.6 (L) 36.3 - 47.1 %   Basic Metabolic Panel w/ Reflex to MG   Result Value Ref Range    Glucose 98 70 - 99 mg/dL    BUN 20 8 - 23 mg/dL    CREATININE 0.78 0.50 - 0.90 mg/dL    Bun/Cre Ratio NOT REPORTED 9 - 20    Calcium 7.7 (L) 8.6 - 10.4 mg/dL    Sodium 138 135 - 144 mmol/L    Potassium 4.1 3.7 - 5.3 mmol/L    Chloride 102 98 - 107 mmol/L    CO2 26 20 - 31 mmol/L    Anion Gap 10 9 - 17 mmol/L    GFR Non-African American >60 >60 mL/min    GFR African American >60 >60 mL/min    GFR Comment          GFR Staging NOT REPORTED    CBC WITH AUTO DIFFERENTIAL   Result Value Ref Range    WBC 7.7 3.5 - 11.3 k/uL    RBC 2.68 (L) 3.95 - 5.11 m/uL    Hemoglobin 8.4 (L) 11.9 - 15.1 g/dL    Hematocrit 27.3 (L) 36.3 - 47.1 %    .9 82.6 - 102.9 fL    MCH 31.3 25.2 - 33.5 pg    MCHC 30.8 28.4 - 34.8 g/dL    RDW 17.1 (H) 11.8 - 14.4 %    Platelets 662 803 - 973 k/uL    MPV 9.3 8.1 - 13.5 fL    NRBC Automated 0.0 0.0 per 100 WBC    Differential Type NOT REPORTED     Seg Neutrophils 74 (H) 36 - 65 %    Lymphocytes 15 (L) 24 - 43 %    Monocytes 7 3 - 12 %    Eosinophils % 2 1 - 4 %    Basophils 1 0 - 2 %    Immature Granulocytes 1 (H) 0 %    Segs Absolute 5.69 1.50 - 8.10 k/uL    Absolute Lymph # 1.18 1.10 - 3.70 k/uL    Absolute Mono # 0.55 0.10 - 1.20 k/uL    Absolute Eos # 0.14 0.00 - 0.44 k/uL    Basophils Absolute 0.04 0.00 - 0.20 k/uL    Absolute Immature Granulocyte 0.07 0.00 - 0.30 k/uL    WBC Morphology NOT REPORTED     RBC Morphology ANISOCYTOSIS PRESENT     Platelet Estimate NOT REPORTED    HEMOGLOBIN AND HEMATOCRIT, BLOOD   Result Value Ref Range    Hemoglobin 8.1 (L) 11.9 - 15.1 g/dL    Hematocrit 27.4 (L) 36.3 - 47.1 %   HEMOGLOBIN AND HEMATOCRIT, BLOOD   Result Value Ref Range    Hemoglobin 8.5 (L) 11.9 - 15.1 g/dL    Hematocrit 28.9 (L) 36.3 - TYPE:               CYTOCENTRIFUGE DIFFERENTIAL CELL COUNT:    Peripheral Blood               Lymphocytes               11%                           Neut/Mono/Eos               89%  Viability:  97%                          Flow cytometric immunophenotyping analysis is performed on peripheral  blood following RBC lysis procedure. These cells are labeled by  dire t, five color immunostaining procedure, and analyzed on a   flow cytometer.                        % POSITIVE                                    Target Cells                    RESULTS:               (Lymphocytes)    1. CD1a               0                      2.     CD2               88       3. CD3               76       4. CD4               62       5. CD5               74       6. CD7               84       7. CD8               19       8. CD10               0       9.     CD11c               12       10. CD16/56               19       11. CD19               9       12. CD20               8       13. CD22               8       14. CD23               6       15. CD25               1       16. CD45               100       17. CD57               15       18.                1       19. FMC7               6       20. Kappa               5       21. Lambda               4           This test was develo ped and its performance characteristics determined  [facility] [address] [facility]. It has not  been cleared or approved by the U.S. Food and Drug Administration. The FDA does not require this test to go through premarket FDA review. This test is used for clinical purposes. It should not be regarded  as investigational or for research. This laboratory is certified  under the [address] (CLIA) as  qualified to perform high complexity clinical laboratory testing.      Joshua Casarez M.D. 19.3 (H) 11.8 - 14.4 %    Platelets 070 086 - 096 k/uL    MPV 9.6 8.1 - 13.5 fL    NRBC Automated 0.9 (H) 0.0 per 100 WBC    Differential Type NOT REPORTED     WBC Morphology NOT REPORTED     RBC Morphology ANISOCYTOSIS PRESENT     Platelet Estimate NOT REPORTED     Seg Neutrophils 78 (H) 36 - 65 %    Lymphocytes 13 (L) 24 - 43 %    Monocytes 7 3 - 12 %    Eosinophils % 1 1 - 4 %    Basophils 0 0 - 2 %    Immature Granulocytes 1 (H) 0 %    Segs Absolute 10.05 (H) 1.50 - 8.10 k/uL    Absolute Lymph # 1.70 1.10 - 3.70 k/uL    Absolute Mono # 0.86 0.10 - 1.20 k/uL    Absolute Eos # 0.07 0.00 - 0.44 k/uL    Basophils Absolute 0.04 0.00 - 0.20 k/uL    Absolute Immature Granulocyte 0.17 0.00 - 0.30 k/uL   Venous Blood Gas, POC   Result Value Ref Range    pH, Harry 7.460 (H) 7.320 - 7.430    pCO2, Harry 31.5 (L) 41.0 - 51.0 mm Hg    pO2, Harry 36.9 30.0 - 50.0 mm Hg    HCO3, Venous 22.4 22.0 - 29.0 mmol/L    Total CO2, Venous 23 23.0 - 30.0 mmol/L    Negative Base Excess, Harry 1 0.0 - 2.0    Positive Base Excess, Harry NOT REPORTED 0.0 - 3.0    O2 Sat, Harry 75 60.0 - 85.0 %    O2 Device/Flow/% NOT REPORTED     Neno Test NOT REPORTED     Sample Site NOT REPORTED     Mode NOT REPORTED     FIO2 NOT REPORTED     Pt Temp NOT REPORTED     POC pH Temp NOT REPORTED     POC pCO2 Temp NOT REPORTED mm Hg    POC pO2 Temp NOT REPORTED mm Hg   Creatinine W/GFR Point of Care   Result Value Ref Range    POC Creatinine 0.91 0.51 - 1.19 mg/dL    GFR Comment >60 >60 mL/min    GFR Non-African American >60 >60 mL/min    GFR Comment         Lactic Acid, POC   Result Value Ref Range    POC Lactic Acid 1.33 0.56 - 1.39 mmol/L   POCT Glucose   Result Value Ref Range    POC Glucose 114 (H) 74 - 100 mg/dL   Anion Gap (Calc) POC   Result Value Ref Range    Anion Gap 12 7 - 16 mmol/L   POC Glucose Fingerstick   Result Value Ref Range    POC Glucose 123 (H) 65 - 105 mg/dL   EKG 12 Lead   Result Value Ref Range    Ventricular Rate 95 BPM    Atrial Rate 95 BPM of stool within the rectum. Bladder is unremarkable. Bilateral inguinal lymphadenopathy, right-greater-than-left. Degenerative changes of the lower lumbar spine. Cholelithiasis. Moderate ascites. Mild anasarca. Tiny fat-containing umbilical hernia. Findings highly suspicious for metastatic gynecologic malignancy. The endometrium is abnormally thickened and there are bilateral adnexal masses, right greater than left. Large enhancing masses are seen throughout the abdomen/pelvis and there is omental caking along with bilateral inguinal lymphadenopathy and moderate ascites. Evaluation is limited due to extensive artifact. Suggest correlation with contrast-enhanced CT of the abdomen/pelvis. Us Non Ob Transvaginal    Result Date: 2019  EXAMINATION: PELVIC ULTRASOUND 2019 TECHNIQUE: Transvaginal pelvic ultrasound was performed. Color Doppler evaluation was performed. COMPARISON: None HISTORY: ORDERING SYSTEM PROVIDED HISTORY: One episode of post-menopausal bleeding S/P Cardiac cath w/ stent placement started Brilinta 19  FINDINGS: Measurements: Uterus:  4.2 x 2.4 x 2.0 cm Endometrial stripe:  Not reliably visualized Right Ovary:  Not measured Left Ovary:  Not measured Ultrasound Findings: Visibility of the uterus is significantly limited due to limitations related to the patient's body habitus, bowel gas, and possible shadowing related to uterine calcifications. Uterus: Uterus is not well visualized. Endometrial stripe: The endometrial stripe is not well visualized. The suspected endometrial tissue is measuring up to 2.6 cm in thickness on transabdominal images. There is no significant vascular flow seen within the endometrial tissue. Right Ovary: Right ovary was not seen. Left Ovary:  Left ovary was not seen. Free Fluid: There is a moderate amount of free fluid in the pelvis.      1.  Essentially nondiagnostic study due to significantly limited visibility of the uterus and endometrial

## 2019-09-15 NOTE — PROGRESS NOTES
Inguinal LN biopsy done. Bilateral Doppler ultrasound of the leg: Bilateral acute DVT  BIOPSY RESULT:  RIGHT INGUINAL LYMPH NODE, CORE BIOPSIES:  METASTATIC POORLY   DIFFERENTIATED ADENOCARCINOMA, COMPATIBLE WITH MULLERIAN PRIMARY. OBJECTIVE     Vital Signs:  BP (!) 151/74   Pulse 77   Temp 98.2 °F (36.8 °C) (Oral)   Resp 21   Ht 5' 7\" (1.702 m) Comment: from floor  Wt 276 lb 3.8 oz (125.3 kg)   SpO2 98%   BMI 43.26 kg/m²     Temp (24hrs), Av.2 °F (36.8 °C), Min:98 °F (36.7 °C), Max:98.5 °F (36.9 °C)    In: 491.7   Out: 500 [Urine:500]    Physical Exam:  Constitutional: This is a well developed, well nourished, Greater than 40 - Morbid Obesity / Extreme Obesity / Grade III 75y.o. year old female who is alert, oriented, cooperative and in no apparent distress. Head:normocephalic and atraumatic. EENT:  PERRLA. No conjunctival injections. Septum was midline, mucosa was without erythema, exudates or cobblestoning. No thrush was noted. Neck: Supple without thyromegaly. No elevated JVP. Trachea was midline. Respiratory: Chest was symmetrical without dullness to percussion. Breath sounds bilaterally were clear to auscultation. There were no wheezes, rhonchi or rales. There is no intercostal retraction or use of accessory muscles. No egophony noted. Cardiovascular: Regular without murmur, clicks, gallops or rubs. Abdomen: Slightly rounded and soft without organomegaly. No rebound, rigidity or guarding was appreciated. Lymphatic: No lymphadenopathy. Musculoskeletal: Normal curvature of the spine. No gross muscle weakness. Extremities:  Mild lower extremity edema, ulcerations, tenderness, varicosities or erythema. Muscle size, tone and strength are normal.  No involuntary movements are noted. Skin:  Warm and dry. Good color, turgor and pigmentation. No lesions or scars.   No cyanosis or clubbing  Neurological/Psychiatric: The patient's general behavior, level of consciousness, input(s): CKTOTAL;3  FASTING LIPID PANEL:  Lab Results   Component Value Date    CHOL 157 05/30/2019    HDL 37 (L) 05/30/2019    TRIG 130 05/30/2019     LIVER PROFILE: No results for input(s): AST, ALT, ALB, BILIDIR, BILITOT, ALKPHOS in the last 72 hours. MICROBIOLOGY:   Lab Results   Component Value Date/Time    CULTURE NO GROWTH 09/12/2019 01:33 PM       Imaging:    Ir Guided Ivc Filter Placement    Result Date: 9/11/2019  No caval thrombus Successful placement of an infrarenal inferior vena cava filter     Ir Fluoro Guided Needle Placement    Result Date: 9/11/2019  Successful ultrasound-guided core biopsy and aspiration of right inguinal mass. Ct Chest Abdomen Pelvis W Contrast    Result Date: 9/9/2019  Pulmonary emboli are noted bilaterally. There are no definitive findings that are diagnostic of right heart strain Bronchiectatic changes are noted. Punctate parenchymal nodular densities are noted, too small to characterize Abdomen and pelvis: Extensive adenopathy likely due to lymphoma or metastatic disease Ascites Low-density in the left lobe of the liver may represent artifact. However a developing low-density lesion would be difficult to exclude given the other findings in the abdomen and pelvis. Nonspecific heterogeneity of the uterus. Detail in this region is limited due to artifact. Right greater than left inguinal adenopathy. Bilateral renal calculi without hydronephrosis. Biopsy and/or PET scan recommended Critical results were called by Dr. Nile Lovelace to Dr. Aida Sim on 9/9/2019 at 22:38. 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 No metastasis on CT chest and abdomen.  OBG/GYN signed off and will follow her outpatient.     Hb 7.2 , required 1 more U

## 2019-09-16 NOTE — PROGRESS NOTES
the last 72 hours. Invalid input(s): CKTOTAL;3  FASTING LIPID PANEL:  Lab Results   Component Value Date    CHOL 157 05/30/2019    HDL 37 (L) 05/30/2019    TRIG 130 05/30/2019     LIVER PROFILE: No results for input(s): AST, ALT, ALB, BILIDIR, BILITOT, ALKPHOS in the last 72 hours. MICROBIOLOGY:   Lab Results   Component Value Date/Time    CULTURE NO GROWTH 09/12/2019 01:33 PM       Imaging:    Ir Guided Ivc Filter Placement    Result Date: 9/11/2019  No caval thrombus Successful placement of an infrarenal inferior vena cava filter     Ir Fluoro Guided Needle Placement    Result Date: 9/11/2019  Successful ultrasound-guided core biopsy and aspiration of right inguinal mass. Ct Chest Abdomen Pelvis W Contrast    Result Date: 9/9/2019  Pulmonary emboli are noted bilaterally. There are no definitive findings that are diagnostic of right heart strain Bronchiectatic changes are noted. Punctate parenchymal nodular densities are noted, too small to characterize Abdomen and pelvis: Extensive adenopathy likely due to lymphoma or metastatic disease Ascites Low-density in the left lobe of the liver may represent artifact. However a developing low-density lesion would be difficult to exclude given the other findings in the abdomen and pelvis. Nonspecific heterogeneity of the uterus. Detail in this region is limited due to artifact. Right greater than left inguinal adenopathy. Bilateral renal calculi without hydronephrosis. Biopsy and/or PET scan recommended Critical results were called by Dr. Yi Moore to Dr. Kristi Harper on 9/9/2019 at 22:38. 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  No metastasis on CT chest and abdomen. OBG/GYN signed off and will follow her outpatient.     Hb 7.2 , required 1 more U PRBC. Bleeding reduced from vagina. On Venofer and multivitamins. Held Lovenox.     CHF (congestive heart failure), NYHA class I, acute on chronic, combined (HCC)  Plan: DHFpEF (55%). No signs of fluid overload. Pitting edema improving with skin wrinkling.     NSTEMI:  S/p PCI with 2 stents. On ASA, Brilinta. Cardiology recommended to continue current treatment. Stable.     Bilateral lower extremity weakness:  Resolved  Secondary to hypothyroidism. Levothyroxine 50 MCG  TSH in 4 to 6 weeks outpatient.      Hypertension: controlled  Plan: On Lopressor 50 twice daily and Aldactone     Morbid obesity ([address]  Plan: Counseled on healthy diet and exercise.     Bacterial vaginosis  Flagyl 500 mg twice daily for 7 days  Last dose 9/14/2019.     LINDSEY: Resolved. Secondary to contrast PCI.     [facility]        PT/OT: ongoing  Discharge Planning / [place] Mo: Ofelia Esparza MD  Internal Medicine Resident, PGY-1  [facility]; [place], [place]  9/16/2019, 9:48 AM   Attending Physician Statement  I have discussed the care of Raúl Saeed, including pertinent history and exam findings,  with the resident. I have seen and examined the patient and the key elements of all parts of the encounter have been performed by me. I agree with the assessment, plan and orders as documented by the resident with additions . Treatment plan Discussed with nursing staff in detail , all questions answered . Electronically signed by Jennifer Frost MD on   9/16/19 at 8:18 PM    Please note that this chart was generated using voice recognition Dragon dictation software. Although every effort was made to ensure the accuracy of this automated transcription, some errors in transcription may have occurred.

## 2019-09-17 NOTE — PROGRESS NOTES
- 0.90 mg/dL    Bun/Cre Ratio NOT REPORTED 9 - 20    Calcium 8.5 (L) 8.6 - 10.4 mg/dL    Sodium 135 135 - 144 mmol/L    Potassium 4.4 3.7 - 5.3 mmol/L    Chloride 100 98 - 107 mmol/L    CO2 22 20 - 31 mmol/L    Anion Gap 13 9 - 17 mmol/L    GFR Non-African American >60 >60 mL/min    GFR African American >60 >60 mL/min    GFR Comment          GFR Staging NOT REPORTED    Troponin   Result Value Ref Range    Troponin, High Sensitivity 55 (HH) 0 - 14 ng/L    Troponin T NOT REPORTED <0.03 ng/mL    Troponin Interp NOT REPORTED    Troponin   Result Value Ref Range    Troponin, High Sensitivity 56 (HH) 0 - 14 ng/L    Troponin T NOT REPORTED <0.03 ng/mL    Troponin Interp NOT REPORTED    TSH with Reflex   Result Value Ref Range    TSH 6.76 (H) 0.30 - 5.00 mIU/L   Brain Natriuretic Peptide   Result Value Ref Range    Pro-BNP 9,240 (H) <300 pg/mL    BNP Interpretation Pro-BNP Reference Range:    Hemoglobin and hematocrit, blood   Result Value Ref Range    POC Hemoglobin 10.9 (L) 12.0 - 16.0 g/dL    POC Hematocrit 32 (L) 36 - 46 %   SODIUM (POC)   Result Value Ref Range    POC Sodium 136 (L) 138 - 146 mmol/L   POTASSIUM (POC)   Result Value Ref Range    POC Potassium 4.2 3.5 - 4.5 mmol/L   CHLORIDE (POC)   Result Value Ref Range    POC Chloride 102 98 - 107 mmol/L   CALCIUM, IONIC (POC)   Result Value Ref Range    POC Ionized Calcium 1.13 (L) 1.15 - 1.33 mmol/L   T4, Free   Result Value Ref Range    Thyroxine, Free 1.34 0.93 - 1.70 ng/dL   CK   Result Value Ref Range    Total CK 49 26 - 192 U/L   Iron and TIBC   Result Value Ref Range    Iron 29 (L) 37 - 145 ug/dL    TIBC 170 (L) 250 - 450 ug/dL    Iron Saturation 17 (L) 20 - 55 %    UIBC 141 112 - 347 ug/dL   Ferritin   Result Value Ref Range    Ferritin 106 13 - 150 ug/L   RETICULOCYTES   Result Value Ref Range    Retic % 3.3 (H) 0.5 - 1.9 %    Absolute Retic # 0.110 (H) 0.030 - 0.080 M/uL    Immature Retic Fract 28.100 (H) 2.7 - 18.3 %    Retic Hemoglobin 34.1 28.2 - 35.7 pg ANTIGEN 19-9   Result Value Ref Range    CA 19-9 2309 (H) 0 - 35 U/mL   HEMOGLOBIN AND HEMATOCRIT, BLOOD   Result Value Ref Range    Hemoglobin 7.1 (L) 11.9 - 15.1 g/dL    Hematocrit 23.0 (L) 36.3 - 47.1 %   Basic Metabolic Panel w/ Reflex to MG   Result Value Ref Range    Glucose 96 70 - 99 mg/dL    BUN 25 (H) 8 - 23 mg/dL    CREATININE 0.97 (H) 0.50 - 0.90 mg/dL    Bun/Cre Ratio NOT REPORTED 9 - 20    Calcium 7.7 (L) 8.6 - 10.4 mg/dL    Sodium 136 135 - 144 mmol/L    Potassium 3.8 3.7 - 5.3 mmol/L    Chloride 102 98 - 107 mmol/L    CO2 25 20 - 31 mmol/L    Anion Gap 9 9 - 17 mmol/L    GFR Non-African American 56 (L) >60 mL/min    GFR African American >60 >60 mL/min    GFR Comment          GFR Staging NOT REPORTED    CBC WITH AUTO DIFFERENTIAL   Result Value Ref Range    WBC 7.2 3.5 - 11.3 k/uL    RBC 2.28 (L) 3.95 - 5.11 m/uL    Hemoglobin 7.3 (L) 11.9 - 15.1 g/dL    Hematocrit 24.2 (L) 36.3 - 47.1 %    .1 (H) 82.6 - 102.9 fL    MCH 32.0 25.2 - 33.5 pg    MCHC 30.2 28.4 - 34.8 g/dL    RDW 16.1 (H) 11.8 - 14.4 %    Platelets 243 420 - 032 k/uL    MPV 9.5 8.1 - 13.5 fL    NRBC Automated 0.0 0.0 per 100 WBC    Differential Type NOT REPORTED     WBC Morphology NOT REPORTED     RBC Morphology ANISOCYTOSIS PRESENT     Platelet Estimate NOT REPORTED     Seg Neutrophils 77 (H) 36 - 65 %    Lymphocytes 13 (L) 24 - 43 %    Monocytes 8 3 - 12 %    Eosinophils % 2 1 - 4 %    Basophils 1 0 - 2 %    Immature Granulocytes 1 (H) 0 %    Segs Absolute 5.53 1.50 - 8.10 k/uL    Absolute Lymph # 0.91 (L) 1.10 - 3.70 k/uL    Absolute Mono # 0.58 0.10 - 1.20 k/uL    Absolute Eos # 0.11 0.00 - 0.44 k/uL    Basophils Absolute 0.04 0.00 - 0.20 k/uL    Absolute Immature Granulocyte 0.05 0.00 - 0.30 k/uL   HEMOGLOBIN AND HEMATOCRIT, BLOOD   Result Value Ref Range    Hemoglobin 8.3 (L) 11.9 - 15.1 g/dL    Hematocrit 25.5 (L) 36.3 - 47.1 %   HEMOGLOBIN AND HEMATOCRIT, BLOOD   Result Value Ref Range    Hemoglobin 8.1 (L) 11.9 - 15.1 g/dL Hematocrit 26.1 (L) 36.3 - 47.1 %   HEMOGLOBIN AND HEMATOCRIT, BLOOD   Result Value Ref Range    Hemoglobin 7.6 (L) 11.9 - 15.1 g/dL    Hematocrit 24.1 (L) 36.3 - 47.1 %   Basic Metabolic Panel w/ Reflex to MG   Result Value Ref Range    Glucose 98 70 - 99 mg/dL    BUN 21 8 - 23 mg/dL    CREATININE 0.90 0.50 - 0.90 mg/dL    Bun/Cre Ratio NOT REPORTED 9 - 20    Calcium 7.6 (L) 8.6 - 10.4 mg/dL    Sodium 135 135 - 144 mmol/L    Potassium 4.0 3.7 - 5.3 mmol/L    Chloride 100 98 - 107 mmol/L    CO2 26 20 - 31 mmol/L    Anion Gap 9 9 - 17 mmol/L    GFR Non-African American >60 >60 mL/min    GFR African American >60 >60 mL/min    GFR Comment          GFR Staging NOT REPORTED    CBC WITH AUTO DIFFERENTIAL   Result Value Ref Range    WBC 7.6 3.5 - 11.3 k/uL    RBC 2.34 (L) 3.95 - 5.11 m/uL    Hemoglobin 7.2 (L) 11.9 - 15.1 g/dL    Hematocrit 24.0 (L) 36.3 - 47.1 %    .6 82.6 - 102.9 fL    MCH 30.8 25.2 - 33.5 pg    MCHC 30.0 28.4 - 34.8 g/dL    RDW 16.4 (H) 11.8 - 14.4 %    Platelets 087 597 - 704 k/uL    MPV 9.2 8.1 - 13.5 fL    NRBC Automated 0.0 0.0 per 100 WBC    Differential Type NOT REPORTED     WBC Morphology NOT REPORTED     RBC Morphology ANISOCYTOSIS PRESENT     Platelet Estimate NOT REPORTED     Seg Neutrophils 78 (H) 36 - 65 %    Lymphocytes 12 (L) 24 - 43 %    Monocytes 7 3 - 12 %    Eosinophils % 2 1 - 4 %    Basophils 0 0 - 2 %    Immature Granulocytes 1 (H) 0 %    Segs Absolute 5.91 1.50 - 8.10 k/uL    Absolute Lymph # 0.88 (L) 1.10 - 3.70 k/uL    Absolute Mono # 0.52 0.10 - 1.20 k/uL    Absolute Eos # 0.18 0.00 - 0.44 k/uL    Basophils Absolute 0.03 0.00 - 0.20 k/uL    Absolute Immature Granulocyte 0.04 0.00 - 0.30 k/uL   HEMOGLOBIN AND HEMATOCRIT, BLOOD   Result Value Ref Range    Hemoglobin 8.8 (L) 11.9 - 15.1 g/dL    Hematocrit 27.9 (L) 36.3 - 47.1 %   HEMOGLOBIN AND HEMATOCRIT, BLOOD   Result Value Ref Range    Hemoglobin 8.8 (L) 11.9 - 15.1 g/dL    Hematocrit 29.2 (L) 36.3 - 47.1 %   Flow g/dL    Hematocrit 28.9 (L) 36.3 - 47.1 %   Flow cytometry leukemia/lymphoma nodes or fluids   Result Value Ref Range    Flow Cytometry Source RT INGUNAL LN     Flow Cytometry, Node/Fluid VS19 [id number]    Surgical Pathology   Result Value Ref Range    Surgical Pathology Report       [id number]  [facility]  ANATOMIC PATHOLOGY  [address] [place] [address], [place] [address]. [facility], [address]  [phone]  Fax: [phone] [address] [facility]    Patient Name: Melody Perez  MR#: [id number]  Specimen #[id number]    Procedures/Addenda  FLOW CYTOMETRY REPORT     Date Ordered:     9/12/2019     Status:  Signed Out       Date Complete:     9/12/2019     By: Miquel Allison M.D. Date Reported:     9/13/2019       INTERPRETATION  FLOW CYTOMETRIC IMMUNOPHENOTYPING ANALYSIS OF RIGHT INGUINAL LYMPH  NODE LYMPHOCYTE POPULATION IS NEGATIVE FOR B-CELL MONOCLONALITY AND  T-CELL ABERRANCY (REACTIVE LYMPHOCYTES). THE LARGE MALIGNANT CELLS  ARE NEGATIVE FOR HEMATOLYMPHOID MARKERS. RESULTS-COMMENTS  SPECIMEN TYPE:               CYTOCENTRIFUGE DIFFERENTIAL CELL COUNT:                             Lymphocytes               4%                           Neoplastic Cells               70%  Lymph Node Tissue               Degener ative Cells          26%  Viability:  59%                        Flow cytometric immunophenotyping analysis is performed on right  inguinal lymph node following RBC lysis procedure. These cells are  labeled by direct, five color immunostaining procedure, and analyzed  on a  flow cytometer.                        % POSITIVE                                    Target Cells                    RESULTS:               (Lymphocytes)    1. CD1a               0                      2.     CD2               94       3. CD3               83       4. CD4               65       5. CD5               84       6. CD7               89       7.      CD8 Ann Marie Read M.D.                    Source:  1: PERIPHERAL BLOOD FOR FLOW CYTOMETRY     HEMOGLOBIN AND HEMATOCRIT, BLOOD   Result Value Ref Range    Hemoglobin 9.6 (L) 11.9 - 15.1 g/dL    Hematocrit 30.5 (L) 36.3 - 47.1 %   Basic Metabolic Panel w/ Reflex to MG   Result Value Ref Range    Glucose 113 (H) 70 - 99 mg/dL    BUN 22 8 - 23 mg/dL    CREATININE 0.72 0.50 - 0.90 mg/dL    Bun/Cre Ratio NOT REPORTED 9 - 20    Calcium 7.7 (L) 8.6 - 10.4 mg/dL    Sodium 138 135 - 144 mmol/L    Potassium 3.9 3.7 - 5.3 mmol/L    Chloride 103 98 - 107 mmol/L    CO2 24 20 - 31 mmol/L    Anion Gap 11 9 - 17 mmol/L    GFR Non-African American >60 >60 mL/min    GFR African American >60 >60 mL/min    GFR Comment          GFR Staging NOT REPORTED    CBC WITH AUTO DIFFERENTIAL   Result Value Ref Range    WBC 7.6 3.5 - 11.3 k/uL    RBC 2.43 (L) 3.95 - 5.11 m/uL    Hemoglobin 7.7 (L) 11.9 - 15.1 g/dL    Hematocrit 25.3 (L) 36.3 - 47.1 %    .1 (H) 82.6 - 102.9 fL    MCH 31.7 25.2 - 33.5 pg    MCHC 30.4 28.4 - 34.8 g/dL    RDW 17.2 (H) 11.8 - 14.4 %    Platelets 984 377 - 993 k/uL    MPV 9.4 8.1 - 13.5 fL    NRBC Automated 0.3 (H) 0.0 per 100 WBC    Differential Type NOT REPORTED     WBC Morphology NOT REPORTED     RBC Morphology ANISOCYTOSIS PRESENT     Platelet Estimate NOT REPORTED     Seg Neutrophils 75 (H) 36 - 65 %    Lymphocytes 15 (L) 24 - 43 %    Monocytes 7 3 - 12 %    Eosinophils % 2 1 - 4 %    Basophils 1 0 - 2 %    Immature Granulocytes 1 (H) 0 %    Segs Absolute 5.73 1.50 - 8.10 k/uL    Absolute Lymph # 1.15 1.10 - 3.70 k/uL    Absolute Mono # 0.52 0.10 - 1.20 k/uL    Absolute Eos # 0.13 0.00 - 0.44 k/uL    Basophils Absolute 0.04 0.00 - 0.20 k/uL    Absolute Immature Granulocyte 0.05 0.00 - 0.30 k/uL   PROTIME-INR   Result Value Ref Range    Protime 13.4 (H) 9.0 - 12.0 sec    INR 1.3    APTT   Result Value Ref Range    PTT 23.7 20.5 - 30.5 sec   HEMOGLOBIN AND HEMATOCRIT, BLOOD   Result Value Ref Range    Hemoglobin 8.0 (L) 2.39 (L) 3.95 - 5.11 m/uL    Hemoglobin 7.2 (L) 11.9 - 15.1 g/dL    Hematocrit 23.5 (L) 36.3 - 47.1 %    MCV 98.3 82.6 - 102.9 fL    MCH 30.1 25.2 - 33.5 pg    MCHC 30.6 28.4 - 34.8 g/dL    RDW 19.3 (H) 11.8 - 14.4 %    Platelets 199 346 - 317 k/uL    MPV 9.6 8.1 - 13.5 fL    NRBC Automated 0.9 (H) 0.0 per 100 WBC    Differential Type NOT REPORTED     WBC Morphology NOT REPORTED     RBC Morphology ANISOCYTOSIS PRESENT     Platelet Estimate NOT REPORTED     Seg Neutrophils 78 (H) 36 - 65 %    Lymphocytes 13 (L) 24 - 43 %    Monocytes 7 3 - 12 %    Eosinophils % 1 1 - 4 %    Basophils 0 0 - 2 %    Immature Granulocytes 1 (H) 0 %    Segs Absolute 10.05 (H) 1.50 - 8.10 k/uL    Absolute Lymph # 1.70 1.10 - 3.70 k/uL    Absolute Mono # 0.86 0.10 - 1.20 k/uL    Absolute Eos # 0.07 0.00 - 0.44 k/uL    Basophils Absolute 0.04 0.00 - 0.20 k/uL    Absolute Immature Granulocyte 0.17 0.00 - 0.30 k/uL   HEMOGLOBIN AND HEMATOCRIT, BLOOD   Result Value Ref Range    Hemoglobin 6.9 (LL) 11.9 - 15.1 g/dL    Hematocrit 21.9 (L) 36.3 - 47.1 %   HEMOGLOBIN AND HEMATOCRIT, BLOOD   Result Value Ref Range    Hemoglobin 8.1 (L) 11.9 - 15.1 g/dL    Hematocrit 26.2 (L) 36.3 - 47.1 %   HEMOGLOBIN AND HEMATOCRIT, BLOOD   Result Value Ref Range    Hemoglobin 7.6 (L) 11.9 - 15.1 g/dL    Hematocrit 24.2 (L) 36.3 - 47.1 %   Basic Metabolic Panel w/ Reflex to MG   Result Value Ref Range    Glucose 99 70 - 99 mg/dL    BUN 47 (H) 8 - 23 mg/dL    CREATININE 1.11 (H) 0.50 - 0.90 mg/dL    Bun/Cre Ratio NOT REPORTED 9 - 20    Calcium 8.0 (L) 8.6 - 10.4 mg/dL    Sodium 136 135 - 144 mmol/L    Potassium 4.1 3.7 - 5.3 mmol/L    Chloride 102 98 - 107 mmol/L    CO2 24 20 - 31 mmol/L    Anion Gap 10 9 - 17 mmol/L    GFR Non-African American 48 (L) >60 mL/min    GFR  58 (L) >60 mL/min    GFR Comment          GFR Staging NOT REPORTED    CBC WITH AUTO DIFFERENTIAL   Result Value Ref Range    WBC 9.2 3.5 - 11.3 k/uL    RBC 2.25 (L) 3.95 - 5.11 m/uL Anion Gap 12 7 - 16 mmol/L   POC Glucose Fingerstick   Result Value Ref Range    POC Glucose 123 (H) 65 - 105 mg/dL   EKG 12 Lead   Result Value Ref Range    Ventricular Rate 95 BPM    Atrial Rate 95 BPM    P-R Interval 204 ms    QRS Duration 80 ms    Q-T Interval 364 ms    QTc Calculation (Bazett) 457 ms    P Axis 58 degrees    R Axis 24 degrees    T Axis 103 degrees   TYPE AND SCREEN   Result Value Ref Range    Expiration Date 09/08/2019     Arm Band Number [id number]     ABO/Rh A POSITIVE     Antibody Screen NEGATIVE    BLOOD BANK SPECIMEN   Result Value Ref Range    Blood Bank Specimen NOT REPORTED    TYPE AND SCREEN   Result Value Ref Range    Expiration Date 09/13/2019     Arm Band Number [id number]     ABO/Rh A POSITIVE     Antibody Screen NEGATIVE     Unit Number [id number]     Product Code Leukocyte Reduced Red Cell     Unit Divison 00     Dispense Status TRANSFUSED     Transfusion Status OK TO TRANSFUSE     Crossmatch Result COMPATIBLE     Unit Number [id number]     Product Code Leukocyte Reduced Red Cell     Unit Divison 00     Dispense Status TRANSFUSED     Transfusion Status OK TO TRANSFUSE     Crossmatch Result COMPATIBLE    BLOOD BANK SPECIMEN   Result Value Ref Range    Blood Bank Specimen NOT REPORTED    BLOOD BANK SPECIMEN   Result Value Ref Range    Blood Bank Specimen NOT REPORTED    TYPE AND SCREEN   Result Value Ref Range    Expiration Date 09/16/2019     Arm Band Number [id number]     ABO/Rh A POSITIVE     Antibody Screen NEGATIVE     Unit Number [id number]     Product Code Leukocyte Reduced Red Cell     Unit Divison 00     Dispense Status TRANSFUSED     Transfusion Status OK TO TRANSFUSE     Crossmatch Result COMPATIBLE     Unit Number [id number]     Product Code Leukocyte Reduced Red Cell     Unit Divison 00     Dispense Status TRANSFUSED     Transfusion Status OK TO TRANSFUSE     Crossmatch Result COMPATIBLE          IMAGING DATA:    Xr Chest Standard (2 Vw)    Result Date:

## 2019-09-17 NOTE — PLAN OF CARE
Problem: Falls - Risk of:  Goal: Will remain free from falls  Description  Will remain free from falls  Outcome: Ongoing  Note:   Patient was free from falls this shift, call light is on reach, non-skid footwear is on, bed locked and in lowest position and hourly rounding performed. RN will continue to monitor.         Problem: Falls - Risk of:  Goal: Absence of physical injury  Description  Absence of physical injury  Outcome: Ongoing     Problem: Skin Integrity:  Goal: Will show no infection signs and symptoms  Description  Will show no infection signs and symptoms  Outcome: Ongoing  Note:   Skin integrity maintained, no new signs of skin breakdown     Problem: Nutrition  Goal: Optimal nutrition therapy  Outcome: Ongoing     Problem: Skin Integrity:  Goal: Absence of new skin breakdown  Description  Absence of new skin breakdown  Outcome: Ongoing  Electronically signed by Юлия Trujillo RN on 9/17/2019 at 5:28 PM

## 2019-09-17 NOTE — PROGRESS NOTES
[facility]  Internal Medicine Teaching Residency Program  Inpatient Daily Progress Note  ______________________________________________________________________________    Patient: Teri Paul  YOB: 1945   [id number]    Acct: [de-identified]     Room: 2021/2021-01  Admit date: 9/2/2019  Today's date: 09/17/19  Number of days in the hospital: 15    SUBJECTIVE   Admitting Diagnosis: CHF (congestive heart failure), NYHA class I, acute on chronic, combined ([facility] [address]  CC: Bilateral weakness of legs  Pt examined at bedside. Chart & results reviewed. No acute events overnight. Waiting for placement. The patient was not accepted at [facility] as she was going to have chemotherapy. Oncology once her to go to rehab for 3 to 4 weeks before they can start chemotherapy with Taxol and carboplatin IV once every 3 weeks.   Hemoglobin 7.7    ROS:  Constitutional:  negative for chills, fevers, sweats  Respiratory:  negative for cough, dyspnea on exertion, hemoptysis, shortness of breath, wheezing  Cardiovascular:  negative for chest pain, chest pressure/discomfort, lower extremity edema, palpitations  Gastrointestinal:  negative for abdominal pain, constipation, diarrhea, nausea, vomiting  Neurological:  negative for dizziness, headache  BRIEF HISTORY   Patient came in with lower extremity weakness.  She has a history of hypothyroidism.  Complaint of shortness of breath, has bilateral pitting edema of her legs.  Follows with cardiology outpatient. Mahsa Gibson got her cardiac cath done yesterday which showed RCA and proximal artery stenosis status post 2 stents.  The patient was started on aspirin and ticagrelor.  The patient did experience one episode of vaginal bleeding.  Hemoglobin dropped to 7.8.  MRI showed abnormally thickened endometrium and bilateral adnexal masses.  OBG GYN and oncology are on board.  CT abdomen pelvis showed incidental bilateral PE and Mayra Joyce MD on   9/17/19 at 6:00 PM    Please note that this chart was generated using voice recognition Dragon dictation software. Although every effort was made to ensure the accuracy of this automated transcription, some errors in transcription may have occurred.

## 2019-09-17 NOTE — CARE COORDINATION
[id number] called received from [facility], they cannot accept patient    [phone] choices ([facility]) placed in computer  patient relates she has family that can transport her to outpatient chemo facility or hospital for chemo    [id number] Call received David Castellon at [facility], they are reviewing information and they will have a staff member come evaluate tomorrow, would need family to take patient to a infusion center for chemo if accept.

## 2019-09-17 NOTE — PROGRESS NOTES
Today's Date: 9/16/2019  Patient Name: Joe Rdud  Date of admission: 9/2/2019  8:32 AM  Patient's age: 75 y.o., 1945  Admission Dx: CHF (congestive heart failure), NYHA class I, acute on chronic, combined ([facility] 75.) [I50.43]    Reason for Consult: management recommendations  Requesting Physician: Mayra Melo MD    CHIEF COMPLAINT: Right-sided groin mass. Weight loss. Endometrial thickening. History Obtained From:  patient, electronic medical record    Interval history:  Patient seen and examined. Labs and vitals reviewed. Condition is fairly stable, discharge plans are underway to rehab. HISTORY OF PRESENT ILLNESS:      The patient is a 75 y.o.  female who is admitted to the hospital for chief complaints of shortness of breath and chest pain. Patient underwent cardiac catheterization on 9/5 with placement of 2 stents. Post cardiac catheterization patient was started on Brilinta. Patient noted to have provisional bleeding subsequently underwent further evaluation including ultrasound which revealed endometrial thickening. Patient also underwent MRI of pelvis which showed endometrial thickening with bilateral adnexal masses omental caking bilateral inguinal lymphadenopathy and moderate ascites. Patient has a palpable large right groin mass which is tennis ball size. Patient was seen by gynecological oncology and is currently under the process of getting CT chest and pelvis soon. Patient does complain of weight loss but says that the weight loss was intentional.  Patient is currently not in any pain. Her BMI is 41.8.     Transvaginal ultrasound was essentially nondiagnostic secondary to limited visibility.  There was concern for endometrial thickness up to 2.6 cm with moderate fluid free fluid.                - MRI showed thickened endometrium with bilateral adnexal masses, omental caking, bilateral inguinal lymphadenopathy and moderate ascites.               -  8/6/19   Sukh Martin MD   metoprolol tartrate (LOPRESSOR) 50 MG tablet Take 1 tablet by mouth 2 times daily 8/6/19   Sukh Martin MD   losartan (COZAAR) 50 MG tablet Take 1 tablet by mouth daily 6/12/19   Cora Jones MD     Current Facility-Administered Medications   Medication Dose Route Frequency Provider Last Rate Last Dose    cefTRIAXone (ROCEPHIN) 1 g IVPB in 50 mL D5W minibag  1 g Intravenous Q24H Marisela Melo MD   Stopped at 09/16/19 0950    ferrous sulfate EC tablet 325 mg  325 mg Oral Daily with breakfast Doyne Galeazzi, MD   325 mg at 09/16/19 0920    therapeutic multivitamin-minerals 1 tablet  1 tablet Oral Daily Doyne Galeazzi, MD   1 tablet at 09/16/19 0920    0.9 % sodium chloride bolus  250 mL Intravenous Once Cindra Hikes, DO        ticagrelor (BRILINTA) tablet 90 mg  90 mg Oral BID Jc Blue MD   90 mg at 09/16/19 2044    aspirin chewable tablet 81 mg  81 mg Oral Daily Bakari Breen MD   81 mg at 09/16/19 0920    0.9 % sodium chloride infusion   Intravenous Continuous Lon Fall, DO   Stopped at 09/16/19 1205    sodium chloride flush 0.9 % injection 30 mL  30 mL Intravenous PRN Lon Fall, DO        polyethylene glycol (GLYCOLAX) packet 17 g  17 g Oral Daily Lon Fall, DO   17 g at 09/15/19 0042    bumetanide (BUMEX) tablet 1 mg  1 mg Oral Daily Lon Fall, DO   1 mg at 09/16/19 0920    sodium chloride flush 0.9 % injection 10 mL  10 mL Intravenous 2 times per day Lon Fall, DO   10 mL at 09/16/19 2045    sodium chloride flush 0.9 % injection 10 mL  10 mL Intravenous PRN Lon Fall, DO        acetaminophen (TYLENOL) tablet 650 mg  650 mg Oral Q4H PRN Lon Fall, DO        levothyroxine (SYNTHROID) tablet 50 mcg  50 mcg Oral Daily Lon Fall, DO   50 mcg at 09/16/19 0920    spironolactone (ALDACTONE) tablet 25 mg  25 mg Oral Daily Lon Fall, DO   25 mg at 09/16/19 0921    sodium chloride flush 0.9 % dizziness, seizures, weakness, numbness    PHYSICAL EXAM:        BP (!) 111/48   Pulse 86   Temp 98.2 °F (36.8 °C) (Oral)   Resp 18   Ht 5' 7\" (1.702 m) Comment: from floor  Wt 276 lb 3.8 oz (125.3 kg)   SpO2 93%   BMI 43.26 kg/m²    Temp (24hrs), Av.4 °F (36.9 °C), Min:98.1 °F (36.7 °C), Max:99.2 °F (37.3 °C)      General appearance -not in distress  Mental status - alert and cooperative   Eyes - pupils equal and reactive, extraocular eye movements intact   Ears - bilateral TM's and external ear canals normal   Mouth - mucous membranes moist, pharynx normal without lesions   Neck - supple, no significant adenopathy   Lymphatics -5-6 cm right groin mass. Chest - clear to auscultation, no wheezes, rales or rhonchi, symmetric air entry   Heart - normal rate, regular rhythm, normal S1, S2, no murmurs  Abdomen - soft, nontender, nondistended, no masses or organomegaly   Neurological - alert, oriented, normal speech, no focal findings or movement disorder noted   Musculoskeletal - no joint tenderness, deformity or swelling   Extremities - peripheral pulses normal, no pedal edema, no clubbing or cyanosis   Skin - normal coloration and turgor, no rashes, no suspicious skin lesions noted ,      DATA:      Labs:     CBC:   Recent Labs     09/15/19  0446  19  0519 19  1442 19  1834   WBC 12.9*  --  9.2  --   --    HGB 7.2*   < > 7.2* 8.0* 7.8*   HCT 23.5*   < > 23.4* 25.3* 25.5*     --  232  --   --     < > = values in this interval not displayed. BMP:   Recent Labs     09/15/19  0446 09/16/19  0519    136   K 4.3 4.1   CO2 23 24   BUN 49* 47*   CREATININE 1.35* 1.11*   LABGLOM 38* 48*   GLUCOSE 155* 99     PT/INR:   No results for input(s): PROTIME, INR in the last 72 hours. APTT:  No results for input(s): APTT in the last 72 hours. LIVER PROFILE:No results for input(s): AST, ALT, LABALBU in the last 72 hours.     Results for orders placed or performed during the hospital nitrofurantoin Value in next row Sensitive       32SUSCEPTIBLE     tigecycline Value in next row        NOT REPORTED     tobramycin Value in next row Sensitive       <=1SUSCEPTIBLE     trimethoprim-sulfamethoxazole Value in next row Sensitive       <=20SUSCEPTIBLE     piperacillin-tazobactam Value in next row Sensitive       16SUSCEPTIBLE   Urine Culture   Result Value Ref Range    Specimen Description . URINE     Special Requests NOT REPORTED     Culture NO GROWTH    Urinalysis Reflex to Culture   Result Value Ref Range    Color, UA YELLOW YELLOW    Turbidity UA CLOUDY (A) CLEAR    Glucose, Ur NEGATIVE NEGATIVE    Bilirubin Urine NEGATIVE NEGATIVE    Ketones, Urine NEGATIVE NEGATIVE    Specific Gravity, UA 1.010 1.005 - 1.030    Urine Hgb NEGATIVE NEGATIVE    pH, UA 5.0 5.0 - 8.0    Protein, UA NEGATIVE NEGATIVE    Urobilinogen, Urine Normal Normal    Nitrite, Urine NEGATIVE NEGATIVE    Leukocyte Esterase, Urine SMALL (A) NEGATIVE    Urinalysis Comments NOT REPORTED    CBC Auto Differential   Result Value Ref Range    WBC 7.5 3.5 - 11.3 k/uL    RBC 3.51 (L) 3.95 - 5.11 m/uL    Hemoglobin 10.8 (L) 11.9 - 15.1 g/dL    Hematocrit 35.0 (L) 36.3 - 47.1 %    MCV 99.7 82.6 - 102.9 fL    MCH 30.8 25.2 - 33.5 pg    MCHC 30.9 28.4 - 34.8 g/dL    RDW 15.6 (H) 11.8 - 14.4 %    Platelets 463 226 - 895 k/uL    MPV 9.6 8.1 - 13.5 fL    NRBC Automated 0.0 0.0 per 100 WBC    Differential Type NOT REPORTED     Seg Neutrophils 77 (H) 36 - 65 %    Lymphocytes 14 (L) 24 - 43 %    Monocytes 7 3 - 12 %    Eosinophils % 1 1 - 4 %    Basophils 1 0 - 2 %    Immature Granulocytes 0 0 %    Segs Absolute 5.81 1.50 - 8.10 k/uL    Absolute Lymph # 1.01 (L) 1.10 - 3.70 k/uL    Absolute Mono # 0.49 0.10 - 1.20 k/uL    Absolute Eos # 0.08 0.00 - 0.44 k/uL    Basophils Absolute 0.04 0.00 - 0.20 k/uL    Absolute Immature Granulocyte 0.03 0.00 - 0.30 k/uL    WBC Morphology NOT REPORTED     RBC Morphology ANISOCYTOSIS PRESENT     Platelet Estimate NOT REPORTED    MAGNESIUM   Result Value Ref Range    Magnesium 2.1 1.6 - 2.6 mg/dL   Troponin   Result Value Ref Range    Troponin, High Sensitivity 41 (H) 0 - 14 ng/L    Troponin T NOT REPORTED <0.03 ng/mL    Troponin Interp NOT REPORTED    Troponin   Result Value Ref Range    Troponin, High Sensitivity 44 (H) 0 - 14 ng/L    Troponin T NOT REPORTED <0.03 ng/mL    Troponin Interp NOT REPORTED    APTT   Result Value Ref Range    PTT 33.9 (H) 20.5 - 30.5 sec   CBC WITH AUTO DIFFERENTIAL   Result Value Ref Range    WBC 13.6 (H) 3.5 - 11.3 k/uL    RBC 3.34 (L) 3.95 - 5.11 m/uL    Hemoglobin 10.3 (L) 11.9 - 15.1 g/dL    Hematocrit 33.5 (L) 36.3 - 47.1 %    .3 82.6 - 102.9 fL    MCH 30.8 25.2 - 33.5 pg    MCHC 30.7 28.4 - 34.8 g/dL    RDW 15.2 (H) 11.8 - 14.4 %    Platelets 267 144 - 258 k/uL    MPV 9.5 8.1 - 13.5 fL    NRBC Automated 0.0 0.0 per 100 WBC    Differential Type NOT REPORTED     WBC Morphology NOT REPORTED     RBC Morphology ANISOCYTOSIS PRESENT     Platelet Estimate NOT REPORTED     Seg Neutrophils 88 (H) 36 - 65 %    Lymphocytes 6 (L) 24 - 43 %    Monocytes 5 3 - 12 %    Eosinophils % 0 (L) 1 - 4 %    Basophils 0 0 - 2 %    Immature Granulocytes 0 0 %    Segs Absolute 11.95 (H) 1.50 - 8.10 k/uL    Absolute Lymph # 0.84 (L) 1.10 - 3.70 k/uL    Absolute Mono # 0.69 0.10 - 1.20 k/uL    Absolute Eos # <0.03 0.00 - 0.44 k/uL    Basophils Absolute 0.05 0.00 - 0.20 k/uL    Absolute Immature Granulocyte 0.05 0.00 - 0.30 k/uL   Basic Metabolic Panel w/ Reflex to MG   Result Value Ref Range    Glucose 130 (H) 70 - 99 mg/dL    BUN 20 8 - 23 mg/dL    CREATININE 1.03 (H) 0.50 - 0.90 mg/dL    Bun/Cre Ratio NOT REPORTED 9 - 20    Calcium 8.0 (L) 8.6 - 10.4 mg/dL    Sodium 137 135 - 144 mmol/L    Potassium 4.4 3.7 - 5.3 mmol/L    Chloride 101 98 - 107 mmol/L    CO2 24 20 - 31 mmol/L    Anion Gap 12 9 - 17 mmol/L    GFR Non-African American 52 (L) >60 mL/min    GFR African American >60 >60 mL/min    GFR Comment GFR Staging NOT REPORTED    CBC   Result Value Ref Range    WBC 10.2 3.5 - 11.3 k/uL    RBC 2.83 (L) 3.95 - 5.11 m/uL    Hemoglobin 8.7 (L) 11.9 - 15.1 g/dL    Hematocrit 28.2 (L) 36.3 - 47.1 %    MCV 99.6 82.6 - 102.9 fL    MCH 30.7 25.2 - 33.5 pg    MCHC 30.9 28.4 - 34.8 g/dL    RDW 15.3 (H) 11.8 - 14.4 %    Platelets 604 657 - 994 k/uL    MPV 9.3 8.1 - 13.5 fL    NRBC Automated 0.0 0.0 per 100 WBC   Protime-INR   Result Value Ref Range    Protime 13.3 (H) 9.0 - 12.0 sec    INR 1.3    EOSINOPHILS, URINE   Result Value Ref Range    Eosinophil, Ur NONE SEEN NONE SEEN   BASIC METABOLIC PANEL   Result Value Ref Range    Glucose 127 (H) 70 - 99 mg/dL    BUN 27 (H) 8 - 23 mg/dL    CREATININE 1.29 (H) 0.50 - 0.90 mg/dL    Bun/Cre Ratio NOT REPORTED 9 - 20    Calcium 8.3 (L) 8.6 - 10.4 mg/dL    Sodium 136 135 - 144 mmol/L    Potassium 4.2 3.7 - 5.3 mmol/L    Chloride 100 98 - 107 mmol/L    CO2 22 20 - 31 mmol/L    Anion Gap 14 9 - 17 mmol/L    GFR Non-African American 40 (L) >60 mL/min    GFR  49 (L) >60 mL/min    GFR Comment          GFR Staging NOT REPORTED    HEMOGLOBIN AND HEMATOCRIT, BLOOD   Result Value Ref Range    Hemoglobin 9.1 (L) 11.9 - 15.1 g/dL    Hematocrit 28.8 (L) 36.3 - 47.1 %   HEMOGLOBIN AND HEMATOCRIT, BLOOD   Result Value Ref Range    Hemoglobin 9.2 (L) 11.9 - 15.1 g/dL    Hematocrit 29.2 (L) 36.3 - 47.1 %   HEMOGLOBIN AND HEMATOCRIT, BLOOD   Result Value Ref Range    Hemoglobin 8.0 (L) 11.9 - 15.1 g/dL    Hematocrit 26.0 (L) 36.3 - 47.1 %   HEMOGLOBIN AND HEMATOCRIT, BLOOD   Result Value Ref Range    Hemoglobin 7.9 (L) 11.9 - 15.1 g/dL    Hematocrit 25.4 (L) 36.3 - 47.1 %   HEMOGLOBIN AND HEMATOCRIT, BLOOD   Result Value Ref Range    Hemoglobin 8.2 (L) 11.9 - 15.1 g/dL    Hematocrit 26.1 (L) 36.3 - 47.1 %   HEMOGLOBIN AND HEMATOCRIT, BLOOD   Result Value Ref Range    Hemoglobin 8.0 (L) 11.9 - 15.1 g/dL    Hematocrit 25.9 (L) 36.3 - 47.1 %   HEMOGLOBIN AND HEMATOCRIT, BLOOD   Result GFR Staging        Result Value Ref Range     2780 (H) <38 U/mL   CEA   Result Value Ref Range    CEA 25.1 (H) <3.9 ng/mL   CANCER ANTIGEN 19-9   Result Value Ref Range    CA 19-9 2309 (H) 0 - 35 U/mL   HEMOGLOBIN AND HEMATOCRIT, BLOOD   Result Value Ref Range    Hemoglobin 7.1 (L) 11.9 - 15.1 g/dL    Hematocrit 23.0 (L) 36.3 - 47.1 %   Basic Metabolic Panel w/ Reflex to MG   Result Value Ref Range    Glucose 96 70 - 99 mg/dL    BUN 25 (H) 8 - 23 mg/dL    CREATININE 0.97 (H) 0.50 - 0.90 mg/dL    Bun/Cre Ratio NOT REPORTED 9 - 20    Calcium 7.7 (L) 8.6 - 10.4 mg/dL    Sodium 136 135 - 144 mmol/L    Potassium 3.8 3.7 - 5.3 mmol/L    Chloride 102 98 - 107 mmol/L    CO2 25 20 - 31 mmol/L    Anion Gap 9 9 - 17 mmol/L    GFR Non-African American 56 (L) >60 mL/min    GFR African American >60 >60 mL/min    GFR Comment          GFR Staging NOT REPORTED    CBC WITH AUTO DIFFERENTIAL   Result Value Ref Range    WBC 7.2 3.5 - 11.3 k/uL    RBC 2.28 (L) 3.95 - 5.11 m/uL    Hemoglobin 7.3 (L) 11.9 - 15.1 g/dL    Hematocrit 24.2 (L) 36.3 - 47.1 %    .1 (H) 82.6 - 102.9 fL    MCH 32.0 25.2 - 33.5 pg    MCHC 30.2 28.4 - 34.8 g/dL    RDW 16.1 (H) 11.8 - 14.4 %    Platelets 449 949 - 202 k/uL    MPV 9.5 8.1 - 13.5 fL    NRBC Automated 0.0 0.0 per 100 WBC    Differential Type NOT REPORTED     WBC Morphology NOT REPORTED     RBC Morphology ANISOCYTOSIS PRESENT     Platelet Estimate NOT REPORTED     Seg Neutrophils 77 (H) 36 - 65 %    Lymphocytes 13 (L) 24 - 43 %    Monocytes 8 3 - 12 %    Eosinophils % 2 1 - 4 %    Basophils 1 0 - 2 %    Immature Granulocytes 1 (H) 0 %    Segs Absolute 5.53 1.50 - 8.10 k/uL    Absolute Lymph # 0.91 (L) 1.10 - 3.70 k/uL    Absolute Mono # 0.58 0.10 - 1.20 k/uL    Absolute Eos # 0.11 0.00 - 0.44 k/uL    Basophils Absolute 0.04 0.00 - 0.20 k/uL    Absolute Immature Granulocyte 0.05 0.00 - 0.30 k/uL   HEMOGLOBIN AND HEMATOCRIT, BLOOD   Result Value Ref Range    Hemoglobin 8.3 (L) 11.9 - - 15.1 g/dL    Hematocrit 27.4 (L) 36.3 - 47.1 %   HEMOGLOBIN AND HEMATOCRIT, BLOOD   Result Value Ref Range    Hemoglobin 8.5 (L) 11.9 - 15.1 g/dL    Hematocrit 28.9 (L) 36.3 - 47.1 %   Flow cytometry leukemia/lymphoma nodes or fluids   Result Value Ref Range    Flow Cytometry Source RT INGUNAL LN     Flow Cytometry, Node/Fluid VS19 [id number]    Surgical Pathology   Result Value Ref Range    Surgical Pathology Report       [id number]  [employer]  [facility]  ANATOMIC PATHOLOGY  [address] [place] [address], [facility]. [facility], [address]  [phone]  Fax: [phone]  [facility]    Patient Name: Floresita Holley  MR#: [id number]  Specimen #[id number]    Procedures/Addenda  FLOW CYTOMETRY REPORT     Date Ordered:     9/12/2019     Status:  Signed Out       Date Complete:     9/12/2019     By: Tommy Parker M.D. Date Reported:     9/13/2019       INTERPRETATION  FLOW CYTOMETRIC IMMUNOPHENOTYPING ANALYSIS OF RIGHT INGUINAL LYMPH  NODE LYMPHOCYTE POPULATION IS NEGATIVE FOR B-CELL MONOCLONALITY AND  T-CELL ABERRANCY (REACTIVE LYMPHOCYTES). THE LARGE MALIGNANT CELLS  ARE NEGATIVE FOR HEMATOLYMPHOID MARKERS. RESULTS-COMMENTS  SPECIMEN TYPE:               CYTOCENTRIFUGE DIFFERENTIAL CELL COUNT:                             Lymphocytes               4%                           Neoplastic Cells               70%  Lymph Node Tissue               Degener ative Cells          26%  Viability:  59%                        Flow cytometric immunophenotyping analysis is performed on right  inguinal lymph node following RBC lysis procedure. These cells are  labeled by direct, five color immunostaining procedure, and analyzed  on a  flow cytometer.                        % POSITIVE                                    Target Cells                    RESULTS:               (Lymphocytes)    1. CD1a               0                      2.     CD2               94       3. CD3               83       4. CD4               65       5. CD5               84       6. CD7               89       7. CD8               19       8. CD10               0       9.     CD11c               8       10. CD16/56               16       11. CD19               2       12. CD20               3       13. CD22               4       14. CD23               2       15. CD25               1       16. CD45                100       17. CD57               14       18.                5       19. FMC7               2       20. Kappa               1       21. Lambda               1           This test was developed and its performance characteristics determined  by [facility] [address] [facility]. It has not  been cleared or approved by the U.S. Food and Drug Administration. The FDA does not require this test to go through premarket FDA review. This test is used for clinical purposes. It should not be regarded  as investigational or for research. This laboratory is certified  under the [address] (CLIA) as  qualified to perform high complexity clinical laboratory testing. Gonzalez Atkins M.D. Final Diagnosis  RIGHT INGUINAL LYMPH NODE, CORE BIOPSIES:  METASTATIC POORLY  DIFFERENTIATED ADENOCARCINOMA, COMPATIBLE WITH MULLERIAN PRIMARY. LINDA Whitman  **Electronically  Signed Out**         ajb/9/13/2019  Clinical Information  Operative Findings:  RT INGUINAL LN BX    Source:  1: RT INGUINAL LN BX    Gross Description  \"BARBARA MARKLEY, RT INGUINAL LN BX\" Two cores, each 0.5 cm in length  x < 0.1 cm in diameter. There are additional fragments, 1.1 x 0.8 x  0.1 cm in aggregate. Entirely 1cs. rh tm        Microscopic Description  Core biopsies of lymph node show effacement by metastatic poorly  differentiated adenocarcinoma. Immunostains performed include ER, p53  and PAX8 (controls reactive). The tumor is positive for PAX8, focally  positive for ER and appears have weak wild type staining pattern on  p53. The morphologic and immunophenotypic features compatible with  metastatic poorly differentiated adenocarcinoma, compatible with  mullerian primary. The radiologic features are most suggestive of  endometrial primary. Surgical Pathology   Result Value Ref Range    Surgical Pathology Report       [id number]  [facility]  [facility]  ANATOMIC PATHOLOGY  [address] [place] [address],  [address] [place] [address] [facility], [address]  [phone]  Fax: [phone]  SURGICAL PATHOLOGY CONSULTATION    Patient Name: Sarah Kilpatrick  MR#: [id number]  Specimen #[id number]    Procedures/Addenda  FLOW CYTOMETRY REPORT     Date Ordered:     9/11/2019     Status:  Signed Out       Date Complete:     9/11/2019     By: Génesis Todd M.D. Date Reported:     9/11/2019       INTERPRETATION  PERIPHERAL BLOOD:  -SEVERE NORMOCYTIC ANEMIA. -LYMPHOPENIA (880/UL); FLOW CYTOMETRY NEGATIVE (REACTIVE LYMPHOCYTES). FLOW CYTOMETRIC IMMUNOPHENOTYPING ANALYSIS OF PERIPHERAL BLOOD  LYMPHOCYTE POPULATION IS NEGATIVE FOR B-CELL MONOCLONALITY AND T-CELL  ABERRANCY.      RESULTS-COMMENTS                           PERIPHERAL BLOOD STUDY    HEMOGRAM                              DIFFERENTIAL %     ABSOLUTE  (K/UL)    WBC (K/uL)     7.6               IMM GRANS          1          0.04        RBC (K/ uL)     2.34               SEGS               78          5.91  HGB (G/dL)     7.2               LYMPHS          12          0.88  HCT (%)     24.0                                     MCV (FL.)     102.6               MONOS          7          0.52  MCH (PG.)     30.8               EOS               2          0.18  MCHC (g/dL)     30.0               BASO                         0.03  RDW (%)     16.4                                          PLT (k/uL) POLYPECTOMY: BENIGN ENDOCERVICAL POLYP. 4. VAGINAL POLYP, POLYPECTOMY:  BENIGN FIBROEPITHELIAL POLYP. LINDA Martinez  **Electronically Signed Out**         ajb/9/16/2019       Clinical Information  Pre-op Diagnosis:  ENDOMETRIAL THICKENING   Operative Findings:  ENDOMETRIAL BIOPSY; ENDOMETRIAL CURETTINGS;  BIOPSY OF CERVICAL POLYP AT 7:00; VAGINAL POLYP  Operation Performed:  PELVIC EUA, DILATATION AND CURETTAGE, PAP SMEAR,  POLYPECTOMY      Source of Specimen  1: ENDOMETRIAL BIOPSY  2: ENDOMETRIAL CURETTINGS  3: BIOPSY OF CERVICAL POLYP AT 7 O'CLOCK  4: VAGINAL POLYP    Gross Description  1. \"BARBARA MARKLEY, ENDOMETRIAL BIOPSY\" Tan-brown fragments, 5.5 x  2.5 x 0.3 cm in aggregate. Entirely 3cs. 2. \"BARBARA MARKLEY, ENDOMETRIAL CURETTINGS\" Pink-red fragments, 2.8  x 1.2 x 0.3 cm in aggregate. Entirely 1cs. 3. \"BARBARA MARKLEY, BIOPSY OF CERVICAL POLYP AT 7:00\" 0.6 x 0.5 x  0.3 cm rubbery pink fragment. Intact, 1cs. tm  4. \"BARBARA MARKLEY, VAGINAL POLYP\" 2.8 x 1.6 x 1.4 cm pedunculated  pink-gray polyp. Inked and bisected 2cs. tm      Microscopic Description  1, 2. 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 Immunostains performed include p53, ER and CT,  controls reactive. The tumor is moderate-strong positive ER, weak  positive CT and has wild-type staining pattern for p53. Morhologic and  immunostaining features are c/w endometrioid adenocarcinoma, grade 2.  3 , 4. Microscopic examination performed.       Basic Metabolic Panel w/ Reflex to MG   Result Value Ref Range    Glucose 140 (H) 70 - 99 mg/dL    BUN 40 (H) 8 - 23 mg/dL    CREATININE 1.14 (H) 0.50 - 0.90 mg/dL    Bun/Cre Ratio NOT REPORTED 9 - 20    Calcium 7.7 (L) 8.6 - 10.4 mg/dL    Sodium 139 135 - 144 mmol/L    Potassium 4.4 3.7 - 5.3 mmol/L    Chloride 104 98 - 107 mmol/L    CO2 24 20 - 31 mmol/L    Anion Gap 11 9 - 17 mmol/L    GFR Non-African American 46 (L) >60 mL/min    GFR  56 (L) >60 mL/min    GFR Comment          GFR Staging NOT REPORTED    CBC WITH AUTO DIFFERENTIAL   Result Value Ref Range    WBC 9.2 3.5 - 11.3 k/uL    RBC 2.28 (L) 3.95 - 5.11 m/uL    Hemoglobin 7.0 (LL) 11.9 - 15.1 g/dL    Hematocrit 22.7 (L) 36.3 - 47.1 %    MCV 99.6 82.6 - 102.9 fL    MCH 30.7 25.2 - 33.5 pg    MCHC 30.8 28.4 - 34.8 g/dL    RDW 19.6 (H) 11.8 - 14.4 %    Platelets 838 346 - 724 k/uL    MPV 9.7 8.1 - 13.5 fL    NRBC Automated 0.3 (H) 0.0 per 100 WBC    Differential Type NOT REPORTED     WBC Morphology NOT REPORTED     RBC Morphology ANISOCYTOSIS PRESENT     Platelet Estimate NOT REPORTED     Seg Neutrophils 81 (H) 36 - 65 %    Lymphocytes 11 (L) 24 - 43 %    Monocytes 6 3 - 12 %    Eosinophils % 0 (L) 1 - 4 %    Basophils 0 0 - 2 %    Immature Granulocytes 1 (H) 0 %    Segs Absolute 7.44 1.50 - 8.10 k/uL    Absolute Lymph # 1.04 (L) 1.10 - 3.70 k/uL    Absolute Mono # 0.55 0.10 - 1.20 k/uL    Absolute Eos # <0.03 0.00 - 0.44 k/uL    Basophils Absolute <0.03 0.00 - 0.20 k/uL    Absolute Immature Granulocyte 0.11 0.00 - 0.30 k/uL   HEMOGLOBIN AND HEMATOCRIT, BLOOD   Result Value Ref Range    Hemoglobin 7.1 (L) 11.9 - 15.1 g/dL    Hematocrit 22.4 (L) 36.3 - 47.1 %   HEMOGLOBIN AND HEMATOCRIT, BLOOD   Result Value Ref Range    Hemoglobin 7.8 (L) 11.9 - 15.1 g/dL    Hematocrit 26.4 (L) 36.3 - 47.1 %   HEMOGLOBIN AND HEMATOCRIT, BLOOD   Result Value Ref Range    Hemoglobin 7.5 (L) 11.9 - 15.1 g/dL    Hematocrit 23.5 (L) 36.3 - 47.1 %   Basic Metabolic Panel w/ Reflex to MG   Result Value Ref Range    Glucose 155 (H) 70 - 99 mg/dL    BUN 49 (H) 8 - 23 mg/dL    CREATININE 1.35 (H) 0.50 - 0.90 mg/dL    Bun/Cre Ratio NOT REPORTED 9 - 20    Calcium 8.0 (L) 8.6 - 10.4 mg/dL    Sodium 139 135 - 144 mmol/L    Potassium 4.3 3.7 - 5.3 mmol/L    Chloride 103 98 - 107 mmol/L    CO2 23 20 - 31 mmol/L    Anion Gap 13 9 - 17 mmol/L    GFR Non-African American 38 (L) >60 mL/min    GFR  multisequence MRI of the pelvis was performed without and with the administration of 20 ml ProHance intravenous contrast. COMPARISON: Pelvic ultrasound on 2019 HISTORY: Postmenopausal bleeding. FINDINGS: Image quality degraded by motion artifact. In addition, many of the sequences are nondiagnostic due to wraparound artifact. Uterus measures 11.5 x 7.4 x 5.5 cm. No fibroids are seen. The endometrium is abnormally thickened, measuring 2.8 cm, with heterogeneous T2 signal and apparent mild enhancement. There are mildly enhancing bilateral adnexal masses, with the larger one on the right measuring 4.3 x 3.8 cm. Omental caking with multiple enhancing masses throughout the abdomen/pelvis, the largest in the right upper quadrant measuring 11.8 x 9.0 cm and 16.1 x 10.4 cm. Large amount of stool within the rectum. Bladder is unremarkable. Bilateral inguinal lymphadenopathy, right-greater-than-left. Degenerative changes of the lower lumbar spine. Cholelithiasis. Moderate ascites. Mild anasarca. Tiny fat-containing umbilical hernia. Findings highly suspicious for metastatic gynecologic malignancy. The endometrium is abnormally thickened and there are bilateral adnexal masses, right greater than left. Large enhancing masses are seen throughout the abdomen/pelvis and there is omental caking along with bilateral inguinal lymphadenopathy and moderate ascites. Evaluation is limited due to extensive artifact. Suggest correlation with contrast-enhanced CT of the abdomen/pelvis. Us Non Ob Transvaginal    Result Date: 2019  EXAMINATION: PELVIC ULTRASOUND 2019 TECHNIQUE: Transvaginal pelvic ultrasound was performed. Color Doppler evaluation was performed.  COMPARISON: None HISTORY: ORDERING SYSTEM PROVIDED HISTORY: One episode of post-menopausal bleeding S/P Cardiac cath w/ stent placement started Brilinta 19  FINDINGS: Measurements: Uterus:  4.2 x 2.4 x 2.0 cm Endometrial stripe:  Not

## 2019-09-18 NOTE — PROGRESS NOTES
BMI 43.26 kg/m²     Hemoglobin 6.9, WBC 7.8, platelets 001, sodium 137, potassium 4.2, BUN 39, creatinine 0.94    Medications:   Scheduled Meds:   cefTRIAXone (ROCEPHIN) IV  1 g Intravenous Q24H    ferrous sulfate  325 mg Oral Daily with breakfast    therapeutic multivitamin-minerals  1 tablet Oral Daily    sodium chloride  250 mL Intravenous Once    ticagrelor  90 mg Oral BID    aspirin  81 mg Oral Daily    polyethylene glycol  17 g Oral Daily    bumetanide  1 mg Oral Daily    sodium chloride flush  10 mL Intravenous 2 times per day    levothyroxine  50 mcg Oral Daily    spironolactone  25 mg Oral Daily    sodium chloride flush  10 mL Intravenous 2 times per day    atorvastatin  40 mg Oral Nightly    metoprolol tartrate  50 mg Oral BID     Continuous Infusions:   sodium chloride Stopped (09/16/19 1205)     CBC:   Recent Labs     09/16/19 0519 09/17/19 0447 09/17/19  2349 09/18/19  0522 09/18/19  0720   WBC 9.2  --  8.5  --   --  7.8  --    HGB 7.2*   < > 7.7*   < > 7.5* 6.9* 7.5*     --  196  --   --  180  --     < > = values in this interval not displayed. BMP:    Recent Labs     09/16/19 0519 09/17/19 0447 09/18/19  0522    138 137   K 4.1 4.0 4.2    102 100   CO2 24 24 29   BUN 47* 43* 39*   CREATININE 1.11* 1.00* 0.94*   GLUCOSE 99 96 103*     Hepatic: No results for input(s): AST, ALT, ALB, BILITOT, ALKPHOS in the last 72 hours. Troponin: No results for input(s): TROPONINI in the last 72 hours. BNP: No results for input(s): BNP in the last 72 hours. Lipids: No results for input(s): CHOL, HDL in the last 72 hours. Invalid input(s): LDLCALCU  INR: No results for input(s): INR in the last 72 hours.     Objective:   Vitals: /60   Pulse 70   Temp 98 °F (36.7 °C) (Oral)   Resp 24   Ht 5' 7\" (1.702 m) Comment: from floor  Wt 276 lb 3.8 oz (125.3 kg)   SpO2 92%   BMI 43.26 kg/m²   General appearance: alert and cooperative with exam  HEENT: Head: Normal, normocephalic, atraumatic. Neck: no adenopathy, supple, symmetrical, trachea midline and thyroid not enlarged, symmetric, no tenderness/mass/nodules  Lungs: diminished breath sounds bibasilar  Heart: regular rate and rhythm  Abdomen: Obese, bowel sounds present  Extremities: Legs are obese, mild edema  Neurologic: Mental status: Alert, oriented, thought content appropriate    EKG: normal sinus rhythm, unchanged from previous tracings. ECHO: reviewed. Ejection fraction: 55%  Stress Test: not obtained. Cardiac Angiography: reviewed.         Assessment / Acute Cardiac Problems:   Patient admitted with the generalized weakness, status post fall  CHF diastolic acute on chronic  Borderline abnormal high-sensitivity troponin,   ECG showed nonspecific T wave changes, poor R wave progression  Hypertension  Hypothyroidism  Anemia  Morbid obesity  9/5/2019 cardiac cath showed mid RCA more than 70% and proximal PDA more than 80% of stenosis  Stent placement at the mid RCA and PDA Dr Mariana Day  Post PCI  vagina bleeding, anemia     9/9/2019 CT chest pulmonary emboli are noted bilaterally, bronchiectatic changes  9/10/2019 lower extremities Doppler study positive for bilateral DVT  Status post IVC filter placement  CT abdomen and pelvis extensive adenopathy likely due to lymphoma or metastatic disease, ascites, bilateral inguinal lymphadenopathy greater on the right than left, bilateral renal calculi  9/12/2019 pelvic EUA, dilatation and curettage, Pap smear, endometrial biopsy, biopsy of the cervical lesion and vaginal polypectomy  9/13/2019 hemodynamically stable patient was emotional and she told me that she has lymphoma  9/14/2019 patient is hemodynamically stable  9/16/2019 patient is hemodynamically stable continue to have hemoglobin below 8    Patient Active Problem List:     Hypertension     Arthritis     Poor circulation     Pneumonia     Hypertensive urgency     Morbid obesity (HCC)     CHF (congestive heart failure),

## 2019-09-18 NOTE — PLAN OF CARE
Problem: Falls - Risk of:  Goal: Will remain free from falls  Description  Will remain free from falls  9/18/2019 0610 by Miguelangel Bridges RN  Outcome: Met This Shift  9/17/2019 1726 by Shaina Bhatia RN  Outcome: Ongoing  Note:   Patient was free from falls this shift, call light is on reach, non-skid footwear is on, bed locked and in lowest position and hourly rounding performed. RN will continue to monitor.      Goal: Absence of physical injury  Description  Absence of physical injury  9/18/2019 0610 by Miguelangel Bridges RN  Outcome: Met This Shift  9/17/2019 1726 by Shaina Bhatia RN  Outcome: Ongoing

## 2019-09-18 NOTE — PROGRESS NOTES
filter placed yesterday by IR today.  Endometrial biopsy and Inguinal LN biopsy done. Bilateral Doppler ultrasound of the leg: Bilateral acute DVT  BIOPSY RESULT:  RIGHT INGUINAL LYMPH NODE, CORE BIOPSIES:  METASTATIC POORLY   DIFFERENTIATED ADENOCARCINOMA, COMPATIBLE WITH MULLERIAN PRIMARY.       OBJECTIVE     Vital Signs:  /60   Pulse 83   Temp 98 °F (36.7 °C) (Oral)   Resp 22   Ht 5' 7\" (1.702 m) Comment: from floor  Wt 276 lb 3.8 oz (125.3 kg)   SpO2 98%   BMI 43.26 kg/m²     Temp (24hrs), Av.9 °F (36.6 °C), Min:97.7 °F (36.5 °C), Max:98 °F (36.7 °C)    In: 440   Out: 1600 [Urine:1600]    Physical Exam:    General appearance: alert and cooperative with exam  HEENT: Head: Normocephalic, no lesions, without obvious abnormality.   Neck: no adenopathy, no carotid bruit, no JVD, supple, symmetrical, trachea midline and thyroid not enlarged, symmetric, no tenderness/mass/nodules  Lungs: clear to auscultation bilaterally  Heart: regular rate and rhythm, S1, S2 normal, no murmur, click, rub or gallop  Abdomen: soft, non-tender; bowel sounds normal; no masses,  no organomegaly  Extremities: edema bilateal pitting  Neurologic: Mental status: Alert, oriented, thought content appropriate    Medications:  Scheduled Medications:    cefTRIAXone (ROCEPHIN) IV  1 g Intravenous Q24H    ferrous sulfate  325 mg Oral Daily with breakfast    therapeutic multivitamin-minerals  1 tablet Oral Daily    sodium chloride  250 mL Intravenous Once    ticagrelor  90 mg Oral BID    aspirin  81 mg Oral Daily    polyethylene glycol  17 g Oral Daily    bumetanide  1 mg Oral Daily    sodium chloride flush  10 mL Intravenous 2 times per day    levothyroxine  50 mcg Oral Daily    spironolactone  25 mg Oral Daily    sodium chloride flush  10 mL Intravenous 2 times per day    atorvastatin  40 mg Oral Nightly    metoprolol tartrate  50 mg Oral BID     Continuous Infusions:    sodium chloride Stopped (19 1205)

## 2019-09-18 NOTE — CARE COORDINATION
Transitional planning-call from [facility] at [facility] accepted. Called [facility] to inform them that patient was accepted at [facility]. 1045 Set up Transportation with [facility] for 1PM. Notified [facility] at [facility] of time. Notified patient and brother of time. Faxed AVS, and CELI to [facility].

## 2019-09-18 NOTE — PROGRESS NOTES
elevated at 25.1              - CA 19-9 elevated at 2309    Patient positive for bilateral PE with bilateral DVT, S/P IVC FILTER    Interval History:   Patient in good spirits, +BM, no melena or hematochezia. Past Medical History:   has a past medical history of Arthritis, CHF (congestive heart failure), NYHA class I, acute on chronic, combined ([address] Hypertension, Hypothyroid, and Poor circulation. Past Surgical History:   has a past surgical history that includes Tooth Extraction; Skin tag removal; Eye surgery; Coronary angioplasty with stent (09/05/2019); Dilation and curettage of uterus (09/12/2019); and Dilation and curettage of uterus (N/A, 9/12/2019). Medications:    Prior to Admission medications    Medication Sig Start Date End Date Taking?  Authorizing Provider   cephALEXin (KEFLEX) 500 MG capsule Take 1 capsule by mouth 2 times daily for 4 days 9/16/19 9/20/19 Yes Morales Iverson MD   aspirin 81 MG chewable tablet Take 1 tablet by mouth daily 9/14/19  Yes David Lockett MD   atorvastatin (LIPITOR) 40 MG tablet Take 1 tablet by mouth nightly 9/13/19  Yes David Lockett MD   levothyroxine (SYNTHROID) 50 MCG tablet Take 1 tablet by mouth daily 9/14/19  Yes David Lockett MD   Multiple Vitamins-Minerals (THERAPEUTIC MULTIVITAMIN-MINERALS) tablet Take 1 tablet by mouth daily 9/13/19 10/13/19 Yes David Lockett MD   ticagrelor (BRILINTA) 90 MG TABS tablet Take 1 tablet by mouth 2 times daily 9/13/19 11/12/19 Yes David Lockett MD   ferrous sulfate 325 (65 Fe) MG EC tablet Take 1 tablet by mouth daily (with breakfast) 9/14/19  Yes David Lockett MD   spironolactone (ALDACTONE) 25 MG tablet Take 1 tablet by mouth daily 9/14/19  Yes David Lockett MD   furosemide (LASIX) 20 MG tablet Take 1 tablet by mouth daily as needed (edema, shortness of breath) Take 1 tab daily x 3 days 8/28/19   JAY Stein - CNP   hydrALAZINE (APRESOLINE) 50 MG DO   25 mg at 09/18/19 0837    sodium chloride flush 0.9 % injection 10 mL  10 mL Intravenous 2 times per day Rajat Heft, DO   10 mL at 09/16/19 2046    sodium chloride flush 0.9 % injection 10 mL  10 mL Intravenous PRN Rajat Heft, DO        magnesium hydroxide (MILK OF MAGNESIA) 400 MG/5ML suspension 30 mL  30 mL Oral Daily PRN Rajat Heft, DO        [facility] injection 4 mg  4 mg Intravenous Q6H PRN Rajat Heft, DO   4 mg at 09/13/19 1405    atorvastatin (LIPITOR) tablet 40 mg  40 mg Oral Nightly Rajat Heft, DO   40 mg at 09/17/19 2149    metoprolol tartrate (LOPRESSOR) tablet 50 mg  50 mg Oral BID Rajat Heft, DO   50 mg at 09/18/19 3107       Allergies: Other and Sulfa antibiotics    Social History:   reports that she has never smoked. She has never used smokeless tobacco. She reports that she does not drink alcohol or use drugs. Family History: family history includes Heart Attack in her father; Heart Disease in her father; Other in her father and mother; Stroke in her father. REVIEW OF SYSTEMS:      Constitutional: No fever or chills. No night sweats, positive weight loss   Eyes: No eye discharge, double vision, or eye pain   HEENT: negative for sore mouth, sore throat, hoarseness and voice change   Respiratory: negative for cough , sputum, dyspnea, wheezing, hemoptysis, chest pain   Cardiovascular: negative for chest pain, dyspnea, palpitations, orthopnea, PND   Gastrointestinal: negative for nausea, vomiting, diarrhea, constipation, abdominal pain, Dysphagia, hematemesis and hematochezia   Genitourinary: negative for frequency, dysuria, nocturia, urinary incontinence, and hematuria   Integument: negative for rash, skin lesions, bruises.    Hematologic/Lymphatic: Positive right groin mass  Endocrine: negative for heat or cold intolerance,weight changes, change in bowel habits and hair loss   Musculoskeletal: negative for myalgias, arthralgias, pain, joint swelling,and bone pain   Neurological: negative for headaches, dizziness, seizures, weakness, numbness    PHYSICAL EXAM:        /60   Pulse 70   Temp 98 °F (36.7 °C) (Oral)   Resp 24   Ht 5' 7\" (1.702 m) Comment: from floor  Wt 276 lb 3.8 oz (125.3 kg)   SpO2 92%   BMI 43.26 kg/m²    Temp (24hrs), Av °F (36.7 °C), Min:98 °F (36.7 °C), Max:98 °F (36.7 °C)      General appearance -not in distress  Mental status - alert and cooperative   Eyes - pupils equal and reactive, extraocular eye movements intact   Ears - bilateral TM's and external ear canals normal   Mouth - mucous membranes moist, pharynx normal without lesions   Neck - supple, no significant adenopathy   Lymphatics -5-6 cm right groin mass. Chest - clear to auscultation, no wheezes, rales or rhonchi, symmetric air entry   Heart - normal rate, regular rhythm, normal S1, S2, no murmurs  Abdomen - soft, nontender, nondistended, no masses or organomegaly   Neurological - alert, oriented, normal speech, no focal findings or movement disorder noted   Musculoskeletal - no joint tenderness, deformity or swelling   Extremities - peripheral pulses normal, no pedal edema, no clubbing or cyanosis   Skin - normal coloration and turgor, no rashes, no suspicious skin lesions noted ,      DATA:      Labs:     CBC:   Recent Labs     19  0522 19  0720   WBC 8.5  --  7.8  --    HGB 7.7*   < > 6.9* 7.5*   HCT 25.6*   < > 23.6* 24.4*     --  180  --     < > = values in this interval not displayed. BMP:   Recent Labs     19  0522    137   K 4.0 4.2   CO2 24 29   BUN 43* 39*   CREATININE 1.00* 0.94*   LABGLOM 54* 58*   GLUCOSE 96 103*     PT/INR:   No results for input(s): PROTIME, INR in the last 72 hours. APTT:  No results for input(s): APTT in the last 72 hours. LIVER PROFILE:No results for input(s): AST, ALT, LABALBU in the last 72 hours.     Results for orders placed or performed during the REPORTED    BASIC METABOLIC PANEL   Result Value Ref Range    Glucose 117 (H) 70 - 99 mg/dL    BUN 23 8 - 23 mg/dL    CREATININE 0.85 0.50 - 0.90 mg/dL    Bun/Cre Ratio NOT REPORTED 9 - 20    Calcium 8.5 (L) 8.6 - 10.4 mg/dL    Sodium 135 135 - 144 mmol/L    Potassium 4.4 3.7 - 5.3 mmol/L    Chloride 100 98 - 107 mmol/L    CO2 22 20 - 31 mmol/L    Anion Gap 13 9 - 17 mmol/L    GFR Non-African American >60 >60 mL/min    GFR African American >60 >60 mL/min    GFR Comment          GFR Staging NOT REPORTED    Troponin   Result Value Ref Range    Troponin, High Sensitivity 55 (HH) 0 - 14 ng/L    Troponin T NOT REPORTED <0.03 ng/mL    Troponin Interp NOT REPORTED    Troponin   Result Value Ref Range    Troponin, High Sensitivity 56 (HH) 0 - 14 ng/L    Troponin T NOT REPORTED <0.03 ng/mL    Troponin Interp NOT REPORTED    TSH with Reflex   Result Value Ref Range    TSH 6.76 (H) 0.30 - 5.00 mIU/L   Brain Natriuretic Peptide   Result Value Ref Range    Pro-BNP 9,240 (H) <300 pg/mL    BNP Interpretation Pro-BNP Reference Range:    Hemoglobin and hematocrit, blood   Result Value Ref Range    POC Hemoglobin 10.9 (L) 12.0 - 16.0 g/dL    POC Hematocrit 32 (L) 36 - 46 %   SODIUM (POC)   Result Value Ref Range    POC Sodium 136 (L) 138 - 146 mmol/L   POTASSIUM (POC)   Result Value Ref Range    POC Potassium 4.2 3.5 - 4.5 mmol/L   CHLORIDE (POC)   Result Value Ref Range    POC Chloride 102 98 - 107 mmol/L   CALCIUM, IONIC (POC)   Result Value Ref Range    POC Ionized Calcium 1.13 (L) 1.15 - 1.33 mmol/L   T4, Free   Result Value Ref Range    Thyroxine, Free 1.34 0.93 - 1.70 ng/dL   CK   Result Value Ref Range    Total CK 49 26 - 192 U/L   Iron and TIBC   Result Value Ref Range    Iron 29 (L) 37 - 145 ug/dL    TIBC 170 (L) 250 - 450 ug/dL    Iron Saturation 17 (L) 20 - 55 %    UIBC 141 112 - 347 ug/dL   Ferritin   Result Value Ref Range    Ferritin 106 13 - 150 ug/L   RETICULOCYTES   Result Value Ref Range    Retic % 3.3 (H) 0.5 - 1.9 REPORTED    MAGNESIUM   Result Value Ref Range    Magnesium 2.1 1.6 - 2.6 mg/dL   Troponin   Result Value Ref Range    Troponin, High Sensitivity 41 (H) 0 - 14 ng/L    Troponin T NOT REPORTED <0.03 ng/mL    Troponin Interp NOT REPORTED    Troponin   Result Value Ref Range    Troponin, High Sensitivity 44 (H) 0 - 14 ng/L    Troponin T NOT REPORTED <0.03 ng/mL    Troponin Interp NOT REPORTED    APTT   Result Value Ref Range    PTT 33.9 (H) 20.5 - 30.5 sec   CBC WITH AUTO DIFFERENTIAL   Result Value Ref Range    WBC 13.6 (H) 3.5 - 11.3 k/uL    RBC 3.34 (L) 3.95 - 5.11 m/uL    Hemoglobin 10.3 (L) 11.9 - 15.1 g/dL    Hematocrit 33.5 (L) 36.3 - 47.1 %    .3 82.6 - 102.9 fL    MCH 30.8 25.2 - 33.5 pg    MCHC 30.7 28.4 - 34.8 g/dL    RDW 15.2 (H) 11.8 - 14.4 %    Platelets 565 100 - 877 k/uL    MPV 9.5 8.1 - 13.5 fL    NRBC Automated 0.0 0.0 per 100 WBC    Differential Type NOT REPORTED     WBC Morphology NOT REPORTED     RBC Morphology ANISOCYTOSIS PRESENT     Platelet Estimate NOT REPORTED     Seg Neutrophils 88 (H) 36 - 65 %    Lymphocytes 6 (L) 24 - 43 %    Monocytes 5 3 - 12 %    Eosinophils % 0 (L) 1 - 4 %    Basophils 0 0 - 2 %    Immature Granulocytes 0 0 %    Segs Absolute 11.95 (H) 1.50 - 8.10 k/uL    Absolute Lymph # 0.84 (L) 1.10 - 3.70 k/uL    Absolute Mono # 0.69 0.10 - 1.20 k/uL    Absolute Eos # <0.03 0.00 - 0.44 k/uL    Basophils Absolute 0.05 0.00 - 0.20 k/uL    Absolute Immature Granulocyte 0.05 0.00 - 0.30 k/uL   Basic Metabolic Panel w/ Reflex to MG   Result Value Ref Range    Glucose 130 (H) 70 - 99 mg/dL    BUN 20 8 - 23 mg/dL    CREATININE 1.03 (H) 0.50 - 0.90 mg/dL    Bun/Cre Ratio NOT REPORTED 9 - 20    Calcium 8.0 (L) 8.6 - 10.4 mg/dL    Sodium 137 135 - 144 mmol/L    Potassium 4.4 3.7 - 5.3 mmol/L    Chloride 101 98 - 107 mmol/L    CO2 24 20 - 31 mmol/L    Anion Gap 12 9 - 17 mmol/L    GFR Non-African American 52 (L) >60 mL/min    GFR African American >60 >60 mL/min    GFR Comment Value Ref Range    Hemoglobin 7.5 (L) 11.9 - 15.1 g/dL    Hematocrit 24.1 (L) 36.3 - 47.1 %   HEMOGLOBIN AND HEMATOCRIT, BLOOD   Result Value Ref Range    Hemoglobin 7.8 (L) 11.9 - 15.1 g/dL    Hematocrit 25.3 (L) 36.3 - 47.1 %   HEMOGLOBIN AND HEMATOCRIT, BLOOD   Result Value Ref Range    Hemoglobin 7.9 (L) 11.9 - 15.1 g/dL    Hematocrit 25.2 (L) 36.3 - 47.1 %   HEMOGLOBIN AND HEMATOCRIT, BLOOD   Result Value Ref Range    Hemoglobin 8.2 (L) 11.9 - 15.1 g/dL    Hematocrit 27.1 (L) 36.3 - 47.1 %   CBC WITH AUTO DIFFERENTIAL   Result Value Ref Range    WBC 7.8 3.5 - 11.3 k/uL    RBC 2.49 (L) 3.95 - 5.11 m/uL    Hemoglobin 7.9 (L) 11.9 - 15.1 g/dL    Hematocrit 25.5 (L) 36.3 - 47.1 %    .4 82.6 - 102.9 fL    MCH 31.7 25.2 - 33.5 pg    MCHC 31.0 28.4 - 34.8 g/dL    RDW 15.9 (H) 11.8 - 14.4 %    Platelets 040 873 - 209 k/uL    MPV 9.6 8.1 - 13.5 fL    NRBC Automated 0.0 0.0 per 100 WBC    Differential Type NOT REPORTED     WBC Morphology NOT REPORTED     RBC Morphology ANISOCYTOSIS PRESENT     Platelet Estimate NOT REPORTED     Seg Neutrophils 77 (H) 36 - 65 %    Lymphocytes 14 (L) 24 - 43 %    Monocytes 7 3 - 12 %    Eosinophils % 2 1 - 4 %    Basophils 0 0 - 2 %    Immature Granulocytes 0 0 %    Segs Absolute 6.05 1.50 - 8.10 k/uL    Absolute Lymph # 1.07 (L) 1.10 - 3.70 k/uL    Absolute Mono # 0.54 0.10 - 1.20 k/uL    Absolute Eos # 0.12 0.00 - 0.44 k/uL    Basophils Absolute 0.03 0.00 - 0.20 k/uL    Absolute Immature Granulocyte 0.03 0.00 - 0.30 k/uL   Basic Metabolic Panel   Result Value Ref Range    Glucose 97 70 - 99 mg/dL    BUN 28 (H) 8 - 23 mg/dL    CREATININE 0.92 (H) 0.50 - 0.90 mg/dL    Bun/Cre Ratio  9 - 20    Calcium 7.8 (L) 8.6 - 10.4 mg/dL    Sodium 135 135 - 144 mmol/L    Potassium 4.0 3.7 - 5.3 mmol/L    Chloride 99 98 - 107 mmol/L    CO2 25 20 - 31 mmol/L    Anion Gap 11 9 - 17 mmol/L    GFR Non-African American 60 (L) >60 mL/min    GFR African American >60 >60 mL/min    GFR Comment 15.1 g/dL    Hematocrit 25.5 (L) 36.3 - 47.1 %   HEMOGLOBIN AND HEMATOCRIT, BLOOD   Result Value Ref Range    Hemoglobin 8.1 (L) 11.9 - 15.1 g/dL    Hematocrit 26.1 (L) 36.3 - 47.1 %   HEMOGLOBIN AND HEMATOCRIT, BLOOD   Result Value Ref Range    Hemoglobin 7.6 (L) 11.9 - 15.1 g/dL    Hematocrit 24.1 (L) 36.3 - 47.1 %   Basic Metabolic Panel w/ Reflex to MG   Result Value Ref Range    Glucose 98 70 - 99 mg/dL    BUN 21 8 - 23 mg/dL    CREATININE 0.90 0.50 - 0.90 mg/dL    Bun/Cre Ratio NOT REPORTED 9 - 20    Calcium 7.6 (L) 8.6 - 10.4 mg/dL    Sodium 135 135 - 144 mmol/L    Potassium 4.0 3.7 - 5.3 mmol/L    Chloride 100 98 - 107 mmol/L    CO2 26 20 - 31 mmol/L    Anion Gap 9 9 - 17 mmol/L    GFR Non-African American >60 >60 mL/min    GFR African American >60 >60 mL/min    GFR Comment          GFR Staging NOT REPORTED    CBC WITH AUTO DIFFERENTIAL   Result Value Ref Range    WBC 7.6 3.5 - 11.3 k/uL    RBC 2.34 (L) 3.95 - 5.11 m/uL    Hemoglobin 7.2 (L) 11.9 - 15.1 g/dL    Hematocrit 24.0 (L) 36.3 - 47.1 %    .6 82.6 - 102.9 fL    MCH 30.8 25.2 - 33.5 pg    MCHC 30.0 28.4 - 34.8 g/dL    RDW 16.4 (H) 11.8 - 14.4 %    Platelets 745 153 - 409 k/uL    MPV 9.2 8.1 - 13.5 fL    NRBC Automated 0.0 0.0 per 100 WBC    Differential Type NOT REPORTED     WBC Morphology NOT REPORTED     RBC Morphology ANISOCYTOSIS PRESENT     Platelet Estimate NOT REPORTED     Seg Neutrophils 78 (H) 36 - 65 %    Lymphocytes 12 (L) 24 - 43 %    Monocytes 7 3 - 12 %    Eosinophils % 2 1 - 4 %    Basophils 0 0 - 2 %    Immature Granulocytes 1 (H) 0 %    Segs Absolute 5.91 1.50 - 8.10 k/uL    Absolute Lymph # 0.88 (L) 1.10 - 3.70 k/uL    Absolute Mono # 0.52 0.10 - 1.20 k/uL    Absolute Eos # 0.18 0.00 - 0.44 k/uL    Basophils Absolute 0.03 0.00 - 0.20 k/uL    Absolute Immature Granulocyte 0.04 0.00 - 0.30 k/uL   HEMOGLOBIN AND HEMATOCRIT, BLOOD   Result Value Ref Range    Hemoglobin 8.8 (L) 11.9 - 15.1 g/dL    Hematocrit 27.9 (L) 36.3 - 47.1 % Immunostains performed include ER, p53  and PAX8 (controls reactive). The tumor is positive for PAX8, focally  positive for ER and appears have weak wild type staining pattern on  p53. The morphologic and immunophenotypic features compatible with  metastatic poorly differentiated adenocarcinoma, compatible with  mullerian primary. The radiologic features are most suggestive of  endometrial primary. Surgical Pathology   Result Value Ref Range    Surgical Pathology Report       [id number]  [facility]  ANATOMIC PATHOLOGY  [address] [place] [address], [facility] [address]. [facility], [address]  [phone]  Fax: [phone]  SURGICAL PATHOLOGY CONSULTATION    Patient Name: Lillian Lee  MR#: [id number]  Specimen #[id number]    Procedures/Addenda  FLOW CYTOMETRY REPORT     Date Ordered:     9/11/2019     Status:  Signed Out       Date Complete:     9/11/2019     By: Aditi Dunaway M.D. Date Reported:     9/11/2019       INTERPRETATION  PERIPHERAL BLOOD:  -SEVERE NORMOCYTIC ANEMIA. -LYMPHOPENIA (880/UL); FLOW CYTOMETRY NEGATIVE (REACTIVE LYMPHOCYTES). FLOW CYTOMETRIC IMMUNOPHENOTYPING ANALYSIS OF PERIPHERAL BLOOD  LYMPHOCYTE POPULATION IS NEGATIVE FOR B-CELL MONOCLONALITY AND T-CELL  ABERRANCY.      RESULTS-COMMENTS                           PERIPHERAL BLOOD STUDY    HEMOGRAM                              DIFFERENTIAL %     ABSOLUTE  (K/UL)    WBC (K/uL)     7.6               IMM GRANS          1          0.04        RBC (K/ uL)     2.34               SEGS               78          5.91  HGB (G/dL)     7.2               LYMPHS          12          0.88  HCT (%)     24.0                                     MCV (FL.)     102.6               MONOS          7          0.52  MCH (PG.)     30.8               EOS               2          0.18  MCHC (g/dL)     30.0               BASO                         0.03  RDW (%)     16.4                                          PLT (k/uL) Granulocyte 0.06 0.00 - 0.30 k/uL   HEMOGLOBIN AND HEMATOCRIT, BLOOD   Result Value Ref Range    Hemoglobin 7.6 (L) 11.9 - 15.1 g/dL    Hematocrit 24.6 (L) 36.3 - 47.1 %   HEMOGLOBIN AND HEMATOCRIT, BLOOD   Result Value Ref Range    Hemoglobin 6.6 (LL) 11.9 - 15.1 g/dL    Hematocrit 22.7 (L) 36.3 - 47.1 %   HEMOGLOBIN AND HEMATOCRIT, BLOOD   Result Value Ref Range    Hemoglobin 7.8 (L) 11.9 - 15.1 g/dL    Hematocrit 24.4 (L) 36.3 - 47.1 %   GYN Cytology   Result Value Ref Range    Cytology Report       [id number]  [facility]  ANATOMIC PATHOLOGY  [address] [place] [address], [address] [place], [address]  [phone]  Fax: [phone]  GYNECOLOGIC CYTOLOGY REPORT    Patient Name: Al Red  MR#: [id number]  Specimen #[id number]  Source:  1: Cervical material, (ThinPrep vial, Imaging-assisted review)    Clinical History  Postmenopausal  Abnormal bleeding: VAGINAL    INTERPRETATION    Cervical material, (ThinPrep vial, Imaging-assisted review):  Specimen Adequacy:       Satisfactory for evaluation.       - Endocervical/transformation zone component present. - Scant cellularity; predominantly blood. Descriptive Diagnosis:       Adenocarcinoma. Cytotechnologist:   Zeenat Moore M.D.  **Electronically Signed Out**         ajb/9/13/2019     Surgical Pathology   Result Value Ref Range    Surgical Pathology Report       [id number]  [facility]  ANATOMIC PATHOLOGY  [address] [place] [address],  [address] [place], [address]  [phone]  Fax: [phone]    [address] [facility]     Patient Name: Al Red [facility] Rec: [id number]  Path Number: [id number]  Collected: 9/12/2019  Received: 9/13/2019  Reported: 9/16/2019 16:24    -- Diagnosis --  1.   ENDOMETRIAL BIOPSY:  ENDOMETRIOID ADENOCARCINOMA, GRADE 2.    2.  ENDOMETRIAL CURETTINGS:  ENDOMETRIOID ADENOCARCINOMA, GRADE 2.    3.  CERVICAL POLYP, AT 7:00, POLYPECTOMY: BENIGN ENDOCERVICAL POLYP. 4. VAGINAL POLYP, POLYPECTOMY:  BENIGN FIBROEPITHELIAL POLYP. LINDA Bell  **Electronically Signed Out**         ajb/9/16/2019       Clinical Information  Pre-op Diagnosis:  ENDOMETRIAL THICKENING   Operative Findings:  ENDOMETRIAL BIOPSY; ENDOMETRIAL CURETTINGS;  BIOPSY OF CERVICAL POLYP AT 7:00; VAGINAL POLYP  Operation Performed:  PELVIC EUA, DILATATION AND CURETTAGE, PAP SMEAR,  POLYPECTOMY      Source of Specimen  1: ENDOMETRIAL BIOPSY  2: ENDOMETRIAL CURETTINGS  3: BIOPSY OF CERVICAL POLYP AT 7 O'CLOCK  4: VAGINAL POLYP    Gross Description  1. \"BARBARA MARKLEY, ENDOMETRIAL BIOPSY\" Tan-brown fragments, 5.5 x  2.5 x 0.3 cm in aggregate. Entirely 3cs. 2. \"BARBARA MARKLEY, ENDOMETRIAL CURETTINGS\" Pink-red fragments, 2.8  x 1.2 x 0.3 cm in aggregate. Entirely 1cs. 3. \"BARBARA MARKLEY, BIOPSY OF CERVICAL POLYP AT 7:00\" 0.6 x 0.5 x  0.3 cm rubbery pink fragment. Intact, 1cs. tm  4. \"BARBARA MARKLEY, VAGINAL POLYP\" 2.8 x 1.6 x 1.4 cm pedunculated  pink-gray polyp. Inked and bisected 2cs. tm      Microscopic Description  1, 2. 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 Immunostains performed include p53, ER and MS,  controls reactive. The tumor is moderate-strong positive ER, weak  positive MS and has wild-type staining pattern for p53. Morhologic and  immunostaining features are c/w endometrioid adenocarcinoma, grade 2.  3 , 4. Microscopic examination performed.       Basic Metabolic Panel w/ Reflex to MG   Result Value Ref Range    Glucose 140 (H) 70 - 99 mg/dL    BUN 40 (H) 8 - 23 mg/dL    CREATININE 1.14 (H) 0.50 - 0.90 mg/dL    Bun/Cre Ratio NOT REPORTED 9 - 20    Calcium 7.7 (L) 8.6 - 10.4 mg/dL    Sodium 139 135 - 144 mmol/L    Potassium 4.4 3.7 - 5.3 mmol/L    Chloride 104 98 - 107 mmol/L    CO2 24 20 - 31 mmol/L    Anion Gap 11 9 - 17 mmol/L    GFR Non-African American 46 (L) >60 mL/min    GFR GFR Comment          GFR Staging NOT REPORTED    CBC WITH AUTO DIFFERENTIAL   Result Value Ref Range    WBC 9.2 3.5 - 11.3 k/uL    RBC 2.25 (L) 3.95 - 5.11 m/uL    Hemoglobin 7.2 (L) 11.9 - 15.1 g/dL    Hematocrit 23.4 (L) 36.3 - 47.1 %    .0 (H) 82.6 - 102.9 fL    MCH 32.0 25.2 - 33.5 pg    MCHC 30.8 28.4 - 34.8 g/dL    RDW 19.7 (H) 11.8 - 14.4 %    Platelets 732 298 - 845 k/uL    MPV 9.8 8.1 - 13.5 fL    NRBC Automated 1.5 (H) 0.0 per 100 WBC    Differential Type NOT REPORTED     WBC Morphology NOT REPORTED     RBC Morphology ANISOCYTOSIS PRESENT     Platelet Estimate NOT REPORTED     Seg Neutrophils 75 (H) 36 - 65 %    Lymphocytes 15 (L) 24 - 43 %    Monocytes 7 3 - 12 %    Eosinophils % 1 1 - 4 %    Basophils 0 0 - 2 %    Immature Granulocytes 1 (H) 0 %    Segs Absolute 6.93 1.50 - 8.10 k/uL    Absolute Lymph # 1.36 1.10 - 3.70 k/uL    Absolute Mono # 0.66 0.10 - 1.20 k/uL    Absolute Eos # 0.10 0.00 - 0.44 k/uL    Basophils Absolute 0.04 0.00 - 0.20 k/uL    Absolute Immature Granulocyte 0.13 0.00 - 0.30 k/uL   HEMOGLOBIN AND HEMATOCRIT, BLOOD   Result Value Ref Range    Hemoglobin 8.0 (L) 11.9 - 15.1 g/dL    Hematocrit 25.3 (L) 36.3 - 47.1 %   HEMOGLOBIN AND HEMATOCRIT, BLOOD   Result Value Ref Range    Hemoglobin 7.8 (L) 11.9 - 15.1 g/dL    Hematocrit 25.5 (L) 36.3 - 47.1 %   HEMOGLOBIN AND HEMATOCRIT, BLOOD   Result Value Ref Range    Hemoglobin 7.2 (L) 11.9 - 15.1 g/dL    Hematocrit 22.9 (L) 36.3 - 47.1 %   Basic Metabolic Panel w/ Reflex to MG   Result Value Ref Range    Glucose 96 70 - 99 mg/dL    BUN 43 (H) 8 - 23 mg/dL    CREATININE 1.00 (H) 0.50 - 0.90 mg/dL    Bun/Cre Ratio NOT REPORTED 9 - 20    Calcium 8.1 (L) 8.6 - 10.4 mg/dL    Sodium 138 135 - 144 mmol/L    Potassium 4.0 3.7 - 5.3 mmol/L    Chloride 102 98 - 107 mmol/L    CO2 24 20 - 31 mmol/L    Anion Gap 12 9 - 17 mmol/L    GFR Non-African American 54 (L) >60 mL/min    GFR African American >60 >60 mL/min    GFR Comment          GFR

## 2019-09-18 NOTE — PROGRESS NOTES
Report called to Jameel Haddad RN at [facility], all questions answered.    Electronically signed by Jazmin Gray RN on 9/18/2019 at 1:26 PM

## 2019-09-18 NOTE — PROGRESS NOTES
Occupational Therapy  Facility/Department: [facility] CAR 2  Daily Treatment Note  NAME: Tanner Sotomayor  : 1945  MRN: [id number]    Date of Service: 2019    Discharge Recommendations: Further therapy recommended at discharge. Assessment   Performance deficits / Impairments: Decreased functional mobility ; Decreased safe awareness;Decreased ADL status; Decreased endurance;Decreased high-level IADLs;Decreased strength  Assessment: Patient demonstrates decreased endurance and strength impacting performance in functional mobility/transfers and with safety and independence with ADL/IADL tasks. Patient is expected to need skilled OT services at this time to address functional limtiations impacting independence. Prognosis: Good  REQUIRES OT FOLLOW UP: Yes  Activity Tolerance  Activity Tolerance: Patient Tolerated treatment well;Patient limited by fatigue  Safety Devices  Safety Devices in place: Yes  Type of devices: All fall risk precautions in place;Call light within reach; Patient at risk for falls; Left in chair;Nurse notified         Patient Diagnosis(es): The encounter diagnosis was Congestive heart failure, unspecified HF chronicity, unspecified heart failure type ([address] has a past medical history of Arthritis, CHF (congestive heart failure), NYHA class I, acute on chronic, combined ([address] Hypertension, Hypothyroid, and Poor circulation. has a past surgical history that includes Tooth Extraction; Skin tag removal; Eye surgery; Coronary angioplasty with stent (2019); Dilation and curettage of uterus (2019); and Dilation and curettage of uterus (N/A, 2019). Restrictions  Restrictions/Precautions  Restrictions/Precautions: Cardiac, General Precautions, Surgical Protocols, Fall Risk  Required Braces or Orthoses?: No  Position Activity Restriction  Other position/activity restrictions:  Up with Assist;   Subjective   General  Patient assessed for rehabilitation services?:

## 2019-09-20 PROBLEM — C54.1 ENDOMETRIAL ADENOCARCINOMA (HCC): Status: ACTIVE | Noted: 2019-01-01

## 2019-09-20 PROBLEM — I82.5Y3 CHRONIC DEEP VEIN THROMBOSIS (DVT) OF PROXIMAL VEIN OF BOTH LOWER EXTREMITIES (HCC): Status: ACTIVE | Noted: 2019-01-01

## 2019-09-20 NOTE — PROGRESS NOTES
Subjective:      Chief Complaint   Patient presents with    Follow-Up from Hospital     F/U from [facility] [place] [address] CHF        Patient ID: Marina Dawkins is a 75 y.o. female. Visit Information    Have you changed or started any medications since your last visit including any over-the-counter medicines, vitamins, or herbal medicines? no   Are you having any side effects from any of your medications? -  no  Have you stopped taking any of your medications? Is so, why? -  no    Have you seen any other physician or provider since your last visit? No  Have you had any other diagnostic tests since your last visit? Yes - Records Obtained  Have you been seen in the emergency room and/or had an admission to a hospital since we last saw you? Yes - Records Obtained  Have you had your routine dental cleaning in the past 6 months? no    Have you activated your [employer] account? If not, what are your barriers?  No:      Patient Care Team:  Michelle Hi MD as PCP - General (Internal Medicine)  Michelle Hi MD as PCP - [facility] Provider    Medical History Review  Past Medical, Family, and Social History reviewed and does not contribute to the patient presenting condition    Health Maintenance   Topic Date Due    DTaP/Tdap/Td vaccine (1 - Tdap) 05/12/1963    Shingles Vaccine (1 of 2) 05/12/1994    Colon cancer screen colonoscopy  05/12/1994    DEXA (modify frequency per FRAX score)  05/12/2009    Annual Wellness Visit (AWV)  05/29/2019    Flu vaccine (1) 09/01/2019    Pneumococcal 65+ years Vaccine (2 of 2 - PPSV23) 04/15/2020    Potassium monitoring  09/18/2020    Creatinine monitoring  09/18/2020    Lipid screen  05/30/2024     HPI    Review of Systems    Objective:   Physical Exam    Assessment / Plan:
and chest tightness. Cardiovascular: Positive for leg swelling. Negative for chest pain. Gastrointestinal: Negative for abdominal distention, abdominal pain, blood in stool, constipation and diarrhea. Endocrine: Negative for cold intolerance and heat intolerance. Genitourinary: Negative for difficulty urinating, dysuria, enuresis, flank pain and frequency. Musculoskeletal: Positive for arthralgias (Both knees) and gait problem (Wheelchair-bound). Negative for back pain and joint swelling. Skin: Negative for color change, pallor and rash. Neurological: Negative for dizziness, facial asymmetry, light-headedness, numbness and headaches. Psychiatric/Behavioral: Negative for agitation, behavioral problems, confusion, decreased concentration and dysphoric mood. Vitals:    09/20/19 1024   BP: 118/62   Pulse: 102   SpO2: 94%   Height: 5' 7.01\" (1.702 m)     Body mass index is 43.25 kg/m². Wt Readings from Last 3 Encounters:   09/17/19 276 lb 3.8 oz (125.3 kg)   08/27/19 281 lb (127.5 kg)   06/12/19 292 lb (132.5 kg)     BP Readings from Last 3 Encounters:   09/20/19 118/62   09/18/19 125/60   09/12/19 (!) 159/85       Physical Exam   Constitutional: She is oriented to person, place, and time. She appears well-developed. Central obesity present, patient is wheelchair-bound   HENT:   Head: Normocephalic and atraumatic. Mouth/Throat: No oropharyngeal exudate. Eyes: Pupils are equal, round, and reactive to light. Conjunctivae are normal. Right eye exhibits no discharge. Left eye exhibits no discharge. No scleral icterus. Neck: Normal range of motion. Neck supple. No JVD present. No tracheal deviation present. No thyromegaly present. Cardiovascular: Normal rate and normal heart sounds. Exam reveals no gallop. No murmur heard. Pulmonary/Chest: Effort normal and breath sounds normal. No stridor. No respiratory distress. She has no wheezes. She has no rales. She exhibits no tenderness.

## 2019-09-25 NOTE — TELEPHONE ENCOUNTER
Returned call to [facility]. Was transferred twice and phone keeps ringing with no answer. Will call back.

## 2019-09-26 NOTE — TELEPHONE ENCOUNTER
Called to [address] [place] [address] to speak with the nurse taking care of the pt. Nurse is not available at this time message given for a call back to schedule pt.

## 2019-09-27 PROBLEM — R06.00 DYSPNEA: Status: ACTIVE | Noted: 2019-01-01

## 2019-09-27 NOTE — DISCHARGE SUMMARY
IR today.  Endometrial biopsy and Inguinal LN biopsy done.   Bilateral Doppler ultrasound of the leg: Bilateral acute DVT  BIOPSY RESULT:  RIGHT INGUINAL LYMPH NODE, CORE BIOPSIES:  METASTATIC POORLY   DIFFERENTIATED ADENOCARCINOMA, COMPATIBLE WITH MULLERIAN PRIMARY.       Procedures/ Significant Interventions:    Cecal replacement and endometrial biopsy with inguinal lymph node biopsy    Consults:     Consults:     Final Specialist Recommendations/Findings:   IP CONSULT TO CARDIOLOGY  IP CONSULT TO INTERNAL MEDICINE  IP CONSULT TO IV TEAM  IP CONSULT TO OB GYN  IP CONSULT TO GYNECOLOGIC ONCOLOGY  IP CONSULT TO HEM/ONC  IP CONSULT TO IV TEAM  IP CONSULT TO IV TEAM      Any Hospital Acquired Infections: none    Discharge Functional Status:  stable    DISCHARGE PLAN     Disposition: Advanced health care    Patient Instructions:   Discharge Medication List as of 9/18/2019 10:44 AM      START taking these medications    Details   cephALEXin (KEFLEX) 500 MG capsule Take 1 capsule by mouth 2 times daily for 4 days, Disp-8 capsule, R-0Normal      aspirin 81 MG chewable tablet Take 1 tablet by mouth daily, Disp-30 tablet, R-3Normal      atorvastatin (LIPITOR) 40 MG tablet Take 1 tablet by mouth nightly, Disp-30 tablet, R-3Normal      Multiple Vitamins-Minerals (THERAPEUTIC MULTIVITAMIN-MINERALS) tablet Take 1 tablet by mouth daily, Disp-30 tablet, R-0Normal      ticagrelor (BRILINTA) 90 MG TABS tablet Take 1 tablet by mouth 2 times daily, Disp-120 tablet, R-0Normal      metroNIDAZOLE (FLAGYL) 500 MG tablet Take 1 tablet by mouth once for 1 dose, Disp-1 tablet, R-0Normal      ferrous sulfate 325 (65 Fe) MG EC tablet Take 1 tablet by mouth daily (with breakfast), Disp-90 tablet, R-3Normal         CONTINUE these medications which have CHANGED    Details   levothyroxine (SYNTHROID) 50 MCG tablet Take 1 tablet by mouth daily, Disp-30 tablet, R-3Normal      spironolactone (ALDACTONE) 25 MG tablet Take 1 tablet by mouth daily, Disp-30 tablet, R-3Normal         CONTINUE these medications which have NOT CHANGED    Details   furosemide (LASIX) 20 MG tablet Take 1 tablet by mouth daily as needed (edema, shortness of breath) Take 1 tab daily x 3 days, Disp-30 tablet, R-0Normal      hydrALAZINE (APRESOLINE) 50 MG tablet Take 1 tablet by mouth every 8 hours, Disp-90 tablet, R-3Normal      metoprolol tartrate (LOPRESSOR) 50 MG tablet Take 1 tablet by mouth 2 times daily, Disp-180 tablet, R-3Normal      losartan (COZAAR) 50 MG tablet Take 1 tablet by mouth daily, Disp-90 tablet, R-1Normal         STOP taking these medications       NIFEdipine (ADALAT CC) 30 MG extended release tablet Comments:   Reason for Stopping:               Activity: activity as tolerated    Diet: cardiac diet    Follow-up:    Harpreet Cisneros, [address] [facility] [address]  [address] [facility] [address] [address] [phone]    In 2 weeks  medical onology    Roxana Guerra MD  [facility]  [address] [facility] [address] [facility]  [phone]    In 2 weeks  Routine post op visit    Harpreet Cisneros MD   [address]  [phone]      Recent diagnosis of Adeno carcinoma of Mullerian origin. F/U with [facility] as outpatient. F/U for Anemia. Patient Instructions: Follow-up gynaeonc for chemotherapy. Take all your medications regularly. Note that over 30 minutes was spent in preparing discharge papers, discussing discharge with patient, medication review, etc.      Maya Stover MD, MD  Internal Medicine Resident, PGY-1  [address] [place], [place]  9/27/2019, 10:45 AM

## 2019-09-27 NOTE — PROGRESS NOTES
score of 4 or greater was education given? Yes       TABLE 2   Risk Score Risk Level Plan of Care   0-3 Little or  No Risk 1. Provide assistance as indicated for ambulation activities  2. Reorient confused/cognitively impaired patient  3. Call-light/bell within patient's reach  4. Chair/bed in low position, stretcher/bed with siderails up except when performing patient care activities  5. Educate patient/family/caregiver on falls prevention  6.  Reassess in 12 weeks or with any noted change in patient condition which places them at a risk for a fall   4-6 Moderate Risk 1. Provide assistance as indicated for ambulation activities  2. Reorient confused/cognitively impaired patient  3. Call-light/bell within patient's reach  4. Chair/bed in low position, stretcher/bed with siderails up except when performing patient care activities  5. Educate patient/family/caregiver on falls prevention     7 or   Higher High Risk 1. Place patient in easily observable treatment room  2. Patient attended at all times by family member or staff  3. Provide assistance as indicated for ambulation activities  4. Reorient confused/cognitively impaired patient  5. Call-light/bell within patient's reach  6. Chair/bed in low position, stretcher/bed with siderails up except when performing patient care activities  7. Educate patient/family/caregiver on falls prevention             Assessment/Plan: Patient was seen today for consultation. She reports no pain, mild vaginal bleeding. Writer spoke with Barbara Starr nurse at [id number] [place] [address] and she reports that pt has had mild vaginal bleeding as well. Dr Charlette Hayden notified and examined pt This procedure has been fully reviewed with the patient and written informed consent has been obtained, pt SIM completed and will have treatment today. Writer notified [facility] that pt will have treatments Mon-Thursday next week at 12:45 and transportation needs to be arranged.          Danita Cespedes

## 2019-09-27 NOTE — PROGRESS NOTES
tablet by mouth daily, Disp: 30 tablet, Rfl: 0    ticagrelor (BRILINTA) 90 MG TABS tablet, Take 1 tablet by mouth 2 times daily, Disp: 120 tablet, Rfl: 0    spironolactone (ALDACTONE) 25 MG tablet, Take 1 tablet by mouth daily, Disp: 30 tablet, Rfl: 3    hydrALAZINE (APRESOLINE) 50 MG tablet, Take 1 tablet by mouth every 8 hours, Disp: 90 tablet, Rfl: 3    metoprolol tartrate (LOPRESSOR) 50 MG tablet, Take 1 tablet by mouth 2 times daily, Disp: 180 tablet, Rfl: 3    losartan (COZAAR) 50 MG tablet, Take 1 tablet by mouth daily, Disp: 90 tablet, Rfl: 1    ALLERGIES:   Allergies   Allergen Reactions    Other Hives     Pepsi cola    Sulfa Antibiotics Hives       SOCIAL HISTORY:  Social History     Socioeconomic History    Marital status: Single     Spouse name: Not on file    Number of children: Not on file    Years of education: Not on file    Highest education level: Not on file   Occupational History    Not on file   Social Needs    Financial resource strain: Not on file    Food insecurity:     Worry: Not on file     Inability: Not on file    Transportation needs:     Medical: Not on file     Non-medical: Not on file   Tobacco Use    Smoking status: Never Smoker    Smokeless tobacco: Never Used   Substance and Sexual Activity    Alcohol use: No    Drug use: No    Sexual activity: Yes   Lifestyle    Physical activity:     Days per week: Not on file     Minutes per session: Not on file    Stress: Not on file   Relationships    Social connections:     Talks on phone: Not on file     Gets together: Not on file     Attends Restorationist service: Not on file     Active member of club or organization: Not on file     Attends meetings of clubs or organizations: Not on file     Relationship status: Not on file    Intimate partner violence:     Fear of current or ex partner: Not on file     Emotionally abused: Not on file     Physically abused: Not on file     Forced sexual activity: Not on file   Other Topics Concern    Not on file   Social History Narrative    Not on file       FAMILY HISTORY:  Family History   Problem Relation Age of Onset    Other Mother         circulatory issues    Heart Disease Father     Stroke Father     Other Father         circulatory issues    Heart Attack Father        REVIEW OF SYSTEMS:    GENERAL/CONSTITUTIONAL: The patient denies fever, fatigue, weakness, weight gain or weight loss. HEENT: The patient denies pain, redness, loss of vision, double or blurred vision. The patient denies ringing in the ears, loss of hearing, hoarseness or constant feeling of a need to clear the throat when nothing is there, food sticking in throat when swallows or painful swallowing. CARDIOVASCULAR: The patient denies chest pain, irregular heartbeats, sudden changes in heartbeat or palpitation, shortness of breath. RESPIRATORY: The patient denies shortness of breath, cough, hemoptysis. GASTROINTESTINAL: The patient denies nausea, vomiting, vomiting, diarrhea, constipation, blood in the stools. GENITOURINARY: The patient denies difficult urination, pain or burning with urination, blood in the urine. MUSCULOSKELETAL: The patient denies arm, buttock, thigh or calf cramps. No joint or muscle pain. SKIN: The patient denies easy bruising, skin redness, skin rash, hives. NEUROLOGIC: The patient denies headache, dizziness, fainting, muscle spasm, loss of consciousness. ENDOCRINE: The patient denies intolerance to hot or cold temperature, flushing. HEMATOLOGIC/LYMPHATIC: The patient denies anemia, bleeding tendency or clotting tendency. ALLERGIC/IMMUNOLOGIC: The patient denies rhinitis, asthma, skin sensitivity. PHYSICAL EXAMINATION:    ECOG:  3 Symptomatic, >50% in bed, but not bedbound    VITAL SIGNS: /63   Pulse 77   Temp 98.3 °F (36.8 °C) (Oral)   Resp 14   SpO2 98%   GENERAL:  General appearance is that of a well-nourished, well-developed in no apparent distress.   NECK:  No

## 2019-11-12 PROBLEM — K92.2 GI BLEED: Status: ACTIVE | Noted: 2019-01-01

## 2019-11-14 PROBLEM — E43 SEVERE MALNUTRITION (HCC): Status: ACTIVE | Noted: 2019-01-01

## 2019-12-04 LAB — SURGICAL PATHOLOGY REPORT: NORMAL

## 2020-07-14 NOTE — PROGRESS NOTES
bibasilar  Heart: regular rate and rhythm  Abdomen: Abdomen is very obese, bowel sounds present  Extremities: Homans sign is negative, no sign of DVT  Neurologic: Mental status: Alert, oriented, thought content appropriate    EKG: normal sinus rhythm, unchanged from previous tracings. ECHO: reviewed. Ejection fraction: 60%  Stress Test: not obtained. Cardiac Angiography: not obtained.         Assessment / Acute Cardiac Problems:   Patient admitted with the generalized weakness, status post fall  CHF diastolic acute on chronic  Borderline abnormal high-sensitivity troponin,   ECG showed nonspecific T wave changes, poor R wave progression  Hypertension  Hypothyroidism  Anemia  Morbid obesity  9/5/2019 cardiac cath showed mid RCA more than 70% and proximal PDA more than 80% of stenosis  Stent placement at the mid RCA and PDA Dr Sanjay Tello  Post PCI  vagina bleeding, anemia     9/9/2019 CT chest pulmonary emboli are noted bilaterally, bronchiectatic changes  CT abdomen and pelvis extensive adenopathy likely due to lymphoma or metastatic disease, ascites, bilateral inguinal lymphadenopathy greater on the right than left, bilateral renal calculi    9/11/2019 hemodynamically stable, diastolic CHF well compensated    Patient Active Problem List:     Hypertension     Arthritis     Poor circulation     Pneumonia     Hypertensive urgency     Morbid obesity (HCC)     CHF (congestive heart failure), NYHA class I, acute on chronic, combined (HCC)     Weakness of both lower limbs     Congestive heart failure (HCC)     Postmenopausal bleeding     Inguinal lymphadenopathy     Pulmonary embolism ([facility] [address]      Plan of Treatment:   Continue with current medication  Okay for biopsy under general anesthesia    Electronically signed by Laura Dejesus MD on 9/11/2019 at 12:34 PM alert

## (undated) DEVICE — SVMMC GYN MIN PK

## (undated) DEVICE — GLOVE SURG SZ 6 THK91MIL LTX FREE SYN POLYISOPRENE ANTI

## (undated) DEVICE — CATHETER,URETHRAL,REDRUBBER,STRL,16FR: Brand: MEDLINE

## (undated) DEVICE — GOWN,AURORA,NONRNF,XL,30/CS: Brand: MEDLINE

## (undated) DEVICE — CONTAINER,SPECIMEN,4OZ,OR STRL: Brand: MEDLINE

## (undated) DEVICE — GLOVE ORANGE PI 8   MSG9080

## (undated) DEVICE — GAUZE,SPONGE,4"X4",16PLY,XRAY,STRL,LF: Brand: MEDLINE

## (undated) DEVICE — GLOVE SURG SZ 8 L12IN FNGR THK79MIL GRN LTX FREE

## (undated) DEVICE — GOWN,POLY REINFORCED,LG: Brand: MEDLINE

## (undated) DEVICE — JELLY,LUBE,STERILE,FLIP TOP,TUBE,2-OZ: Brand: MEDLINE

## (undated) DEVICE — DRAPE,REIN 53X77,STERILE: Brand: MEDLINE

## (undated) DEVICE — DEFENDO AIR WATER SUCTION AND BIOPSY VALVE KIT FOR  OLYMPUS: Brand: DEFENDO AIR/WATER/SUCTION AND BIOPSY VALVE

## (undated) DEVICE — GLOVE SURG SZ 65 THK91MIL LTX FREE SYN POLYISOPRENE

## (undated) DEVICE — BITEBLOCK 54FR W/ DENT RIM BLOX

## (undated) DEVICE — TUBING, SUCTION, 9/32" X 20', STRAIGHT: Brand: MEDLINE INDUSTRIES, INC.

## (undated) DEVICE — YANKAUER,FLEXIBLE HANDLE,REGLR CAPACITY: Brand: MEDLINE INDUSTRIES, INC.

## (undated) DEVICE — SOLUTION SCRB 4OZ 4% CHG H2O AIDED FOR PREOPERATIVE SKIN

## (undated) DEVICE — GLOVE ORANGE PI 7   MSG9070

## (undated) DEVICE — FORCEPS BX L240CM JAW DIA2.8MM L CAP W/ NDL MIC MESH TOOTH

## (undated) DEVICE — ELECTROSURGICAL PENCIL BUTTON SWITCH E-Z CLEAN COATED BLADE ELECTRODE 10 FT (3 M) CORD HOLSTER: Brand: MEGADYNE